# Patient Record
Sex: FEMALE | Race: WHITE | NOT HISPANIC OR LATINO | Employment: OTHER | URBAN - METROPOLITAN AREA
[De-identification: names, ages, dates, MRNs, and addresses within clinical notes are randomized per-mention and may not be internally consistent; named-entity substitution may affect disease eponyms.]

---

## 2017-04-14 ENCOUNTER — HOSPITAL ENCOUNTER (OUTPATIENT)
Dept: RADIOLOGY | Facility: MEDICAL CENTER | Age: 72
End: 2017-04-14
Attending: FAMILY MEDICINE
Payer: MEDICARE

## 2017-04-14 DIAGNOSIS — Z13.9 SCREENING: ICD-10-CM

## 2017-04-14 PROCEDURE — G0202 SCR MAMMO BI INCL CAD: HCPCS

## 2017-04-28 ENCOUNTER — OFFICE VISIT (OUTPATIENT)
Dept: URGENT CARE | Facility: CLINIC | Age: 72
End: 2017-04-28
Payer: MEDICARE

## 2017-04-28 ENCOUNTER — APPOINTMENT (OUTPATIENT)
Dept: RADIOLOGY | Facility: IMAGING CENTER | Age: 72
End: 2017-04-28
Attending: PHYSICIAN ASSISTANT
Payer: MEDICARE

## 2017-04-28 VITALS
WEIGHT: 145 LBS | TEMPERATURE: 98.5 F | DIASTOLIC BLOOD PRESSURE: 88 MMHG | HEIGHT: 68 IN | RESPIRATION RATE: 14 BRPM | OXYGEN SATURATION: 96 % | BODY MASS INDEX: 21.98 KG/M2 | SYSTOLIC BLOOD PRESSURE: 124 MMHG | HEART RATE: 69 BPM

## 2017-04-28 DIAGNOSIS — S92.514A CLOSED NONDISPLACED FRACTURE OF PROXIMAL PHALANX OF LESSER TOE OF RIGHT FOOT, INITIAL ENCOUNTER: ICD-10-CM

## 2017-04-28 DIAGNOSIS — S99.921A FOOT INJURY, RIGHT, INITIAL ENCOUNTER: ICD-10-CM

## 2017-04-28 DIAGNOSIS — S01.111A LACERATION OF EYEBROW, RIGHT, INITIAL ENCOUNTER: ICD-10-CM

## 2017-04-28 PROCEDURE — 1036F TOBACCO NON-USER: CPT | Performed by: PHYSICIAN ASSISTANT

## 2017-04-28 PROCEDURE — 1101F PT FALLS ASSESS-DOCD LE1/YR: CPT | Mod: 8P | Performed by: PHYSICIAN ASSISTANT

## 2017-04-28 PROCEDURE — 12011 RPR F/E/E/N/L/M 2.5 CM/<: CPT | Performed by: PHYSICIAN ASSISTANT

## 2017-04-28 PROCEDURE — 90714 TD VACC NO PRESV 7 YRS+ IM: CPT | Performed by: PHYSICIAN ASSISTANT

## 2017-04-28 PROCEDURE — 90471 IMMUNIZATION ADMIN: CPT | Mod: 59 | Performed by: PHYSICIAN ASSISTANT

## 2017-04-28 PROCEDURE — 4040F PNEUMOC VAC/ADMIN/RCVD: CPT | Mod: 8P | Performed by: PHYSICIAN ASSISTANT

## 2017-04-28 PROCEDURE — G8432 DEP SCR NOT DOC, RNG: HCPCS | Performed by: PHYSICIAN ASSISTANT

## 2017-04-28 PROCEDURE — 99214 OFFICE O/P EST MOD 30 MIN: CPT | Mod: 25 | Performed by: PHYSICIAN ASSISTANT

## 2017-04-28 PROCEDURE — 3014F SCREEN MAMMO DOC REV: CPT | Performed by: PHYSICIAN ASSISTANT

## 2017-04-28 PROCEDURE — 73630 X-RAY EXAM OF FOOT: CPT | Mod: TC,RT | Performed by: PHYSICIAN ASSISTANT

## 2017-04-28 PROCEDURE — 3017F COLORECTAL CA SCREEN DOC REV: CPT | Mod: 8P | Performed by: PHYSICIAN ASSISTANT

## 2017-04-28 PROCEDURE — G8420 CALC BMI NORM PARAMETERS: HCPCS | Performed by: PHYSICIAN ASSISTANT

## 2017-04-28 ASSESSMENT — ENCOUNTER SYMPTOMS
COUGH: 0
FEVER: 0
FOCAL WEAKNESS: 0
SHORTNESS OF BREATH: 0
LOSS OF CONSCIOUSNESS: 0
PALPITATIONS: 0
CHILLS: 0
TINGLING: 0

## 2017-04-28 NOTE — PROGRESS NOTES
Subjective:      Michelle Moon is a 71 y.o. female who presents with Fall            Fall  The accident occurred 12 to 24 hours ago. Fall occurred: slipped on icecube in the kitchen. The point of impact was the head. The pain is present in the head and right foot. Pertinent negatives include no fever, loss of consciousness or tingling. She has tried nothing for the symptoms.       Review of Systems   Constitutional: Negative for fever, chills and malaise/fatigue.   Respiratory: Negative for cough and shortness of breath.    Cardiovascular: Negative for chest pain and palpitations.   Musculoskeletal:        Right foot pain.     Skin:        3 cm laceration above right eyebrow   Neurological: Negative for tingling, focal weakness and loss of consciousness.     All other systems reviewed and are negative.  PMH:  has a past medical history of Hypertension. She also has no past medical history of Breast cancer.  MEDS:   Current outpatient prescriptions:   •  lisinopril (PRINIVIL) 10 MG TABS, Take 10 mg by mouth every day., Disp: , Rfl:   •  FLUoxetine HCl, PMDD, 20 MG CAPS, Take  by mouth every day., Disp: , Rfl:   •  rosuvastatin (CRESTOR) 20 MG TABS, Take 20 mg by mouth every evening., Disp: , Rfl:   •  NON SPECIFIED, DC PICC Line when Daptomycin infusion complete, Disp: 1 Act, Rfl: 0  •  niacin 250 MG Tab, Take 250 mg by mouth every day., Disp: , Rfl:   •  daptomycin (CUBICIN) 500 MG SOLR, by Intravenous route., Disp: , Rfl:   •  oxycodone immediate-release (ROXICODONE) 5 MG TABS, Take 1-2 Tabs by mouth every four hours as needed (for pain)., Disp: 80 Tab, Rfl: 0  •  aspirin EC (ECOTRIN) 325 MG TBEC, Take 1 Tab by mouth every day., Disp: 60 Tab, Rfl: 0  •  CALCIUM PO, Take  by mouth every day., Disp: , Rfl:   •  Coenzyme Q10 (CO Q-10 PO), Take  by mouth every day., Disp: , Rfl:   •  VITAMIN D PO, Take  by mouth every day., Disp: , Rfl:   •  Multiple Vitamin (MULTIVITAMIN PO), Take  by mouth every day., Disp: , Rfl:  "  ALLERGIES:   Allergies   Allergen Reactions   • Nkda [No Known Drug Allergy]      SURGHX:   Past Surgical History   Procedure Laterality Date   • Hysterectomy laparoscopy  1994   • Septoplasty  4/26/2010     Performed by TRISTON SYLVESTER at SURGERY SAME DAY [facility]   • Turbinate reduction  4/26/2010     Performed by TRISTON SYLVESTER at SURGERY [facility] DAY [facility]   • Septoplasty  10/7/2010     Performed by TRISTON SYLVESTER at SURGERY SAME DAY [facility]   • Pr reduction of large breast     • Irrigation & debridement ortho Right 7/21/2015     Procedure: IRRIGATION & DEBRIDEMENT ORTHO;  Surgeon: Sameer Kwan M.D.;  Location: SURGERY [facility];  Service:    • Hardware removal ortho  7/21/2015     Procedure: HARDWARE REMOVAL PATELLA;  Surgeon: Sameer Kwan M.D.;  Location: SURGERY [facility];  Service:    • Irrigation & debridement ortho Right 7/23/2015     Procedure: IRRIGATION & DEBRIDEMENT ORTHO KNEE ;  Surgeon: Sameer Kwan M.D.;  Location: SURGERY [facility];  Service:    • Patella orif  7/23/2015     Procedure: PATELLA ORIF REVISION;  Surgeon: Sameer Kwan M.D.;  Location: SURGERY [facility];  Service:      SOCHX:  reports that she has never smoked. She does not have any smokeless tobacco history on file. She reports that she drinks alcohol. She reports that she does not use illicit drugs.  FH: Family history was reviewed, no pertinent findings to report  Medications, Allergies, and current problem list reviewed today in Epic       Objective:     /88 mmHg  Pulse 69  Temp(Src) 36.9 °C (98.5 °F)  Resp 14  Ht 1.727 m (5' 7.99\")  Wt 65.772 kg (145 lb)  BMI 22.05 kg/m2  SpO2 96%     Physical Exam   Constitutional: She is oriented to person, place, and time. She appears well-developed and well-nourished.   HENT:   Head: Normocephalic.       Cardiovascular: Normal rate, regular rhythm, normal heart sounds, intact distal pulses and normal pulses.  "   Pulmonary/Chest: Effort normal and breath sounds normal.   Musculoskeletal: She exhibits tenderness. She exhibits no edema or deformity.   Tenderness to palpation of 2nd and 3rd digit of right foot.   Neurological: She is alert and oriented to person, place, and time. She has normal reflexes. She displays normal reflexes. She exhibits normal muscle tone. Coordination normal.   Skin: Skin is warm and dry.   Psychiatric: She has a normal mood and affect. Her behavior is normal. Judgment and thought content normal.   Vitals reviewed.         /28/2017 9:38 AM    HISTORY/REASON FOR EXAM:  Fell last evening, second and third toe pain.    TECHNIQUE/EXAM DESCRIPTION AND NUMBER OF VIEWS:  3 nonweightbearing views of the RIGHT foot.    COMPARISON:  None.    FINDINGS:  There is an acute third proximal phalanx fracture at the PIP joint where there is slight articular surface depression    There is no subluxation.    Minimal first metatarsal-phalangeal marginal erosive change appears corticated         Impression        Likely slightly depressed third proximal phalanx fracture at the PIP margin    First metatarsal-phalangeal mild marginal erosive change is suspicious for gout arthropathy. This may also just be degenerative          Assessment/Plan:     1. Closed nondisplaced fracture of proximal phalanx of lesser toe of right foot, initial encounter  DX-FOOT-COMPLETE 3+ RIGHT   2. Laceration of eyebrow, right, initial encounter  Tdap =>8yo IM    TD =>8yo IM                Dermabond  -herbert tape toes w/ surgical shoe, pt declines crutches  -F/U with private ortho doc per her request                 Differential diagnosis, natural history, supportive care, and indications for immediate follow-up discussed at length.   Follow-up with primary care provider within 4-5 days, emergency room precautions discussed.  Patient and/or family appears understanding of information.

## 2017-04-28 NOTE — PATIENT INSTRUCTIONS
Toe Fracture  A toe fracture is a break in one of the toe bones (phalanges).  CAUSES  This condition may be caused by:  · Dropping a heavy object on your toe.  · Stubbing your toe.  · Overusing your toe or doing repetitive exercise.  · Twisting or stretching your toe out of place.  RISK FACTORS  This condition is more likely to develop in people who:  · Play contact sports.  · Have a bone disease.  · Have a low calcium level.  SYMPTOMS  The main symptoms of this condition are swelling and pain in the toe. The pain may get worse with standing or walking. Other symptoms include:  · Bruising.  · Stiffness.  · Numbness.  · A change in the way the toe looks.  · Broken bones that poke through the skin.  · Blood beneath the toenail.  DIAGNOSIS  This condition is diagnosed with a physical exam. You may also have X-rays.  TREATMENT   Treatment for this condition depends on the type of fracture and its severity. Treatment may involve:  · Taping the broken toe to a toe that is next to it (herbert taping). This is the most common treatment for fractures in which the bone has not moved out of place (nondisplaced fracture).  · Wearing a shoe that has a wide, rigid sole to protect the toe and to limit its movement.  · Wearing a walking cast.  · Having a procedure to move the toe back into place.  · Surgery. This may be needed:  ¨ If there are many pieces of broken bone that are out of place (displaced).  ¨ If the toe joint breaks.  ¨ If the bone breaks through the skin.  · Physical therapy. This is done to help regain movement and strength in the toe.  You may need follow-up X-rays to make sure that the bone is healing well and staying in position.  HOME CARE INSTRUCTIONS  · If your toe was treated with herbert taping, follow instructions from your health care provider about changing the gauze and tape.  If You Have a Cast:  · Do not stick anything inside the cast to scratch your skin. Doing that increases your risk of  infection.  · Check the skin around the cast every day. Report any concerns to your health care provider. You may put lotion on dry skin around the edges of the cast. Do not apply lotion to the skin underneath the cast.  · Do not put pressure on any part of the cast until it is fully hardened. This may take several hours.  · Keep the cast clean and dry.  Bathing  · Do not take baths, swim, or use a hot tub until your health care provider approves. Ask your health care provider if you can take showers. You may only be allowed to take sponge baths for bathing.  · If your health care provider approves bathing and showering, cover the cast or bandage (dressing) with a watertight plastic bag to protect it from water. Do not let the cast or dressing get wet.  Managing Pain, Stiffness, and Swelling  · If you do not have a cast, apply ice to the injured area, if directed.  ¨ Put ice in a plastic bag.  ¨ Place a towel between your skin and the bag.  ¨ Leave the ice on for 20 minutes, 2-3 times per day.  · Move your toes often to avoid stiffness and to lessen swelling.  · Raise (elevate) the injured area above the level of your heart while you are sitting or lying down.  Driving  · Do not drive or operate heavy machinery while taking pain medicine.  · Do not drive while wearing a cast on a foot that you use for driving.  Activity  · Return to your normal activities as directed by your health care provider. Ask your health care provider what activities are safe for you.  · Perform exercises daily as directed by your health care provider or physical therapist.  Safety  · Do not use the injured limb to support your body weight until your health care provider says that you can. Use crutches or other assistive devices as directed by your health care provider.  General Instructions  · If your toe was treated with herbert taping, follow your health care provider's instructions for changing the gauze and tape. Change it more  often:  ¨ The gauze and tape get wet. If this happens, dry the space between the toes.  ¨ The gauze and tape are too tight and cause your toe to become pale or numb.  · Wear a protective shoe as directed by your health care provider. If you were not given a protective shoe, wear sturdy, supportive shoes. Your shoes should not pinch your toes and should not fit tightly against your toes.  · Do not use any tobacco products, including cigarettes, chewing tobacco, or e-cigarettes. Tobacco can delay bone healing. If you need help quitting, ask your health care provider.  · Take medicines only as directed by your health care provider.  · Keep all follow-up visits as directed by your health care provider. This is important.  SEEK MEDICAL CARE IF:  · You have a fever.  · Your pain medicine is not helping.  · Your toe is cold.  · Your toe is numb.  · You still have pain after one week of rest and treatment.  · You still have pain after your health care provider has said that you can start walking again.  · You have pain, tingling, or numbness in your foot that is not going away.  SEEK IMMEDIATE MEDICAL CARE IF:  · You have severe pain.  · You have redness or inflammation in your toe that is getting worse.  · You have pain or numbness in your toe that is getting worse.  · Your toe turns blue.     This information is not intended to replace advice given to you by your health care provider. Make sure you discuss any questions you have with your health care provider.     Document Released: 12/15/2001 Document Revised: 05/03/2016 Document Reviewed: 10/14/2015  [employer] Interactive Patient Education ©2016 [employer]  Nonsutured Laceration Care  A laceration is a cut that goes through all layers of the skin and extends into the tissue that is right under the skin. This type of cut is usually stitched up (sutured) or closed with tape (adhesive strips) or skin glue shortly after the injury happens.  However, if the wound is dirty  or if several hours pass before medical treatment is provided, it is likely that germs (bacteria) will enter the wound. Closing a laceration after bacteria have entered it increases the risk of infection. In these cases, your health care provider may leave the laceration open (nonsutured) and cover it with a bandage. This type of treatment helps prevent infection and allows the wound to heal from the deepest layer of tissue damage up to the surface.  An open fracture is a type of injury that may involve nonsutured lacerations. An open fracture is a break in a bone that happens along with one or more lacerations through the skin that is near the fracture site.  HOW TO CARE FOR YOUR NONSUTURED LACERATION  · Take or apply over-the-counter and prescription medicines only as told by your health care provider.  · If you were prescribed an antibiotic medicine, take or apply it as told by your health care provider. Do not stop using the antibiotic even if your condition improves.  · Clean the wound one time each day or as told by your health care provider.  ¨ Wash the wound with mild soap and water.  ¨ Rinse the wound with water to remove all soap.  ¨ Pat your wound dry with a clean towel. Do not rub the wound.  · Do not inject anything into the wound unless your health care provider told you to.  · Change any bandages (dressings) as told by your health care provider. This includes changing the dressing if it gets wet, dirty, or starts to smell bad.  · Keep the dressing dry until your health care provider says it can be removed. Do not take baths, swim, or do anything that puts your wound underwater until your health care provider approves.  · Raise (elevate) the injured area above the level of your heart while you are sitting or lying down, if possible.  · Do not scratch or pick at the wound.  · Check your wound every day for signs of infection. Watch for:  ¨ Redness, swelling, or pain.  ¨ Fluid, blood, or pus.  · Keep all  follow-up visits as told by your health care provider. This is important.  SEEK MEDICAL CARE IF:  · You received a tetanus and shot and you have swelling, severe pain, redness, or bleeding at the injection site.    · You have a fever.  · Your pain is not controlled with medicine.  · You have increased redness, swelling, or pain at the site of your wound.  · You have fluid, blood, or pus coming from your wound.  · You notice a bad smell coming from your wound or your dressing.  · You notice something coming out of the wound, such as wood or glass.  · You notice a change in the color of your skin near your wound.  · You develop a new rash.  · You need to change the dressing frequently due to fluid, blood, or pus draining from the wound.  · You develop numbness around your wound.  SEEK IMMEDIATE MEDICAL CARE IF:  · Your pain suddenly increases and is severe.  · You develop severe swelling around the wound.  · The wound is on your hand or foot and you cannot properly move a finger or toe.  · The wound is on your hand or foot and you notice that your fingers or toes look pale or bluish.  · You have a red streak going away from your wound.     This information is not intended to replace advice given to you by your health care provider. Make sure you discuss any questions you have with your health care provider.     Document Released: 11/15/2007 Document Revised: 05/03/2016 Document Reviewed: 12/14/2015  [employer] Interactive Patient Education ©2016 [employer]

## 2017-04-28 NOTE — MR AVS SNAPSHOT
"Michelle Moon   2017 9:00 AM   Office Visit   MRN: [id number]    Department:  [facility]   Dept Phone:  [phone]    Description:  Female : 1945   Provider:  Terrell Escoto PA-C           Reason for Visit     Fall patient had a fall last night, laceration right eyebrow and injured right toes      Allergies as of 2017     Allergen Noted Reactions    Nkda [No Known Drug Allergy] 2010         You were diagnosed with     Closed nondisplaced fracture of proximal phalanx of lesser toe of right foot, initial encounter   [222215]       Laceration of eyebrow, right, initial encounter   [547806]         Vital Signs     Blood Pressure Pulse Temperature Respirations Height Weight    124/88 mmHg 69 36.9 °C (98.5 °F) 14 1.727 m (5' 7.99\") 65.772 kg (145 lb)    Body Mass Index Oxygen Saturation                22.05 kg/m2 96%          Basic Information     Date Of Birth Sex Race Ethnicity Preferred Language    1945 Female White Non- English      Problem List              ICD-10-CM Priority Class Noted - Resolved    Closed fracture of patella S82.009A   2015 - Present    Post op infection T81.4XXA   2015 - Present    Septic arthritis of knee (CMS-HCC) M00.9   2015 - Present      Health Maintenance        Date Due Completion Dates    IMM DTaP/Tdap/Td Vaccine (1 - Tdap) 1966 ---    PAP SMEAR 1968 ---    COLONOSCOPY 1997 ---    IMM ZOSTER VACCINE 2007 ---    IMM PNEUMOCOCCAL 65+ (ADULT) LOW/MEDIUM RISK SERIES (1 of 2 - PCV13) 2012 ---    MAMMOGRAM 2018, 3/16/2016, 2015, 2013, 3/20/2012, 3/9/2011, 2010, 2010, 2009, 2009, 2007, 2007, 2006    BONE DENSITY 9/3/2020 9/3/2015, 2011            Current Immunizations     Influenza Vaccine Adult HD 10/27/2016  3:18 PM, 2015  3:37 PM, 10/1/2014    TD Vaccine 2017 10:26 AM    Tdap Vaccine  Incomplete      Below and/or " attached are the medications your provider expects you to take. Review all of your home medications and newly ordered medications with your provider and/or pharmacist. Follow medication instructions as directed by your provider and/or pharmacist. Please keep your medication list with you and share with your provider. Update the information when medications are discontinued, doses are changed, or new medications (including over-the-counter products) are added; and carry medication information at all times in the event of emergency situations     Allergies:  NKDA - (reactions not documented)               Medications  Valid as of: April 28, 2017 - 10:40 AM    Generic Name Brand Name Tablet Size Instructions for use    Aspirin (Tablet Delayed Response) ECOTRIN 325 MG Take 1 Tab by mouth every day.        Calcium   Take  by mouth every day.        Cholecalciferol   Take  by mouth every day.        Coenzyme Q10   Take  by mouth every day.        DAPTOmycin (Recon Soln) CUBICIN 500 MG by Intravenous route.        FLUoxetine HCl (PMDD) (Cap) Fluoxetine HCl (PMDD) 20 MG Take  by mouth every day.        Lisinopril (Tab) PRINIVIL 10 MG Take 10 mg by mouth every day.        Multiple Vitamins-Minerals   Take  by mouth every day.        Niacin (Tab) niacin 250 MG Take 250 mg by mouth every day.        NON SPECIFIED   DC PICC Line when Daptomycin infusion complete        OxyCODONE HCl (Tab) ROXICODONE 5 MG Take 1-2 Tabs by mouth every four hours as needed (for pain).        Rosuvastatin Calcium (Tab) CRESTOR 20 MG Take 20 mg by mouth every evening.        .                 Medicines prescribed today were sent to:     [id number] - [place], [place] - [address] [place] [address]    Phone: [phone] Fax: [phone]    Open 24 Hours?: No      Medication refill instructions:       If your prescription bottle indicates you have medication refills left, it is not necessary to call your provider’s office.  Please contact your pharmacy and they will refill your medication.    If your prescription bottle indicates you do not have any refills left, you may request refills at any time through one of the following ways: The online [employer] system (except Urgent Care), by calling your provider’s office, or by asking your pharmacy to contact your provider’s office with a refill request. Medication refills are processed only during regular business hours and may not be available until the next business day. Your provider may request additional information or to have a follow-up visit with you prior to refilling your medication.   *Please Note: Medication refills are assigned a new Rx number when refilled electronically. Your pharmacy may indicate that no refills were authorized even though a new prescription for the same medication is available at the pharmacy. Please request the medicine by name with the pharmacy before contacting your provider for a refill.        Your To Do List     Future Labs/Procedures Complete By Expires    DX-FOOT-COMPLETE 3+ RIGHT  As directed 4/28/2018      Instructions    Toe Fracture  A toe fracture is a break in one of the toe bones (phalanges).  CAUSES  This condition may be caused by:  · Dropping a heavy object on your toe.  · Stubbing your toe.  · Overusing your toe or doing repetitive exercise.  · Twisting or stretching your toe out of place.  RISK FACTORS  This condition is more likely to develop in people who:  · Play contact sports.  · Have a bone disease.  · Have a low calcium level.  SYMPTOMS  The main symptoms of this condition are swelling and pain in the toe. The pain may get worse with standing or walking. Other symptoms include:  · Bruising.  · Stiffness.  · Numbness.  · A change in the way the toe looks.  · Broken bones that poke through the skin.  · Blood beneath the toenail.  DIAGNOSIS  This condition is diagnosed with a physical exam. You may also have X-rays.  TREATMENT      Treatment for this condition depends on the type of fracture and its severity. Treatment may involve:  · Taping the broken toe to a toe that is next to it (herbert taping). This is the most common treatment for fractures in which the bone has not moved out of place (nondisplaced fracture).  · Wearing a shoe that has a wide, rigid sole to protect the toe and to limit its movement.  · Wearing a walking cast.  · Having a procedure to move the toe back into place.  · Surgery. This may be needed:  ¨ If there are many pieces of broken bone that are out of place (displaced).  ¨ If the toe joint breaks.  ¨ If the bone breaks through the skin.  · Physical therapy. This is done to help regain movement and strength in the toe.  You may need follow-up X-rays to make sure that the bone is healing well and staying in position.  HOME CARE INSTRUCTIONS  · If your toe was treated with herbert taping, follow instructions from your health care provider about changing the gauze and tape.  If You Have a Cast:  · Do not stick anything inside the cast to scratch your skin. Doing that increases your risk of infection.  · Check the skin around the cast every day. Report any concerns to your health care provider. You may put lotion on dry skin around the edges of the cast. Do not apply lotion to the skin underneath the cast.  · Do not put pressure on any part of the cast until it is fully hardened. This may take several hours.  · Keep the cast clean and dry.  Bathing  · Do not take baths, swim, or use a hot tub until your health care provider approves. Ask your health care provider if you can take showers. You may only be allowed to take sponge baths for bathing.  · If your health care provider approves bathing and showering, cover the cast or bandage (dressing) with a watertight plastic bag to protect it from water. Do not let the cast or dressing get wet.  Managing Pain, Stiffness, and Swelling  · If you do not have a cast, apply ice to the  injured area, if directed.  ¨ Put ice in a plastic bag.  ¨ Place a towel between your skin and the bag.  ¨ Leave the ice on for 20 minutes, 2-3 times per day.  · Move your toes often to avoid stiffness and to lessen swelling.  · Raise (elevate) the injured area above the level of your heart while you are sitting or lying down.  Driving  · Do not drive or operate heavy machinery while taking pain medicine.  · Do not drive while wearing a cast on a foot that you use for driving.  Activity  · Return to your normal activities as directed by your health care provider. Ask your health care provider what activities are safe for you.  · Perform exercises daily as directed by your health care provider or physical therapist.  Safety  · Do not use the injured limb to support your body weight until your health care provider says that you can. Use crutches or other assistive devices as directed by your health care provider.  General Instructions  · If your toe was treated with herbert taping, follow your health care provider's instructions for changing the gauze and tape. Change it more often:  ¨ The gauze and tape get wet. If this happens, dry the space between the toes.  ¨ The gauze and tape are too tight and cause your toe to become pale or numb.  · Wear a protective shoe as directed by your health care provider. If you were not given a protective shoe, wear sturdy, supportive shoes. Your shoes should not pinch your toes and should not fit tightly against your toes.  · Do not use any tobacco products, including cigarettes, chewing tobacco, or e-cigarettes. Tobacco can delay bone healing. If you need help quitting, ask your health care provider.  · Take medicines only as directed by your health care provider.  · Keep all follow-up visits as directed by your health care provider. This is important.  SEEK MEDICAL CARE IF:  · You have a fever.  · Your pain medicine is not helping.  · Your toe is cold.  · Your toe is numb.  · You  still have pain after one week of rest and treatment.  · You still have pain after your health care provider has said that you can start walking again.  · You have pain, tingling, or numbness in your foot that is not going away.  SEEK IMMEDIATE MEDICAL CARE IF:  · You have severe pain.  · You have redness or inflammation in your toe that is getting worse.  · You have pain or numbness in your toe that is getting worse.  · Your toe turns blue.     This information is not intended to replace advice given to you by your health care provider. Make sure you discuss any questions you have with your health care provider.     Document Released: 12/15/2001 Document Revised: 05/03/2016 Document Reviewed: 10/14/2015  [employer] Interactive Patient Education ©2016 [employer]  Nonsutured Laceration Care  A laceration is a cut that goes through all layers of the skin and extends into the tissue that is right under the skin. This type of cut is usually stitched up (sutured) or closed with tape (adhesive strips) or skin glue shortly after the injury happens.  However, if the wound is dirty or if several hours pass before medical treatment is provided, it is likely that germs (bacteria) will enter the wound. Closing a laceration after bacteria have entered it increases the risk of infection. In these cases, your health care provider may leave the laceration open (nonsutured) and cover it with a bandage. This type of treatment helps prevent infection and allows the wound to heal from the deepest layer of tissue damage up to the surface.  An open fracture is a type of injury that may involve nonsutured lacerations. An open fracture is a break in a bone that happens along with one or more lacerations through the skin that is near the fracture site.  HOW TO CARE FOR YOUR NONSUTURED LACERATION  · Take or apply over-the-counter and prescription medicines only as told by your health care provider.  · If you were prescribed an antibiotic  medicine, take or apply it as told by your health care provider. Do not stop using the antibiotic even if your condition improves.  · Clean the wound one time each day or as told by your health care provider.  ¨ Wash the wound with mild soap and water.  ¨ Rinse the wound with water to remove all soap.  ¨ Pat your wound dry with a clean towel. Do not rub the wound.  · Do not inject anything into the wound unless your health care provider told you to.  · Change any bandages (dressings) as told by your health care provider. This includes changing the dressing if it gets wet, dirty, or starts to smell bad.  · Keep the dressing dry until your health care provider says it can be removed. Do not take baths, swim, or do anything that puts your wound underwater until your health care provider approves.  · Raise (elevate) the injured area above the level of your heart while you are sitting or lying down, if possible.  · Do not scratch or pick at the wound.  · Check your wound every day for signs of infection. Watch for:  ¨ Redness, swelling, or pain.  ¨ Fluid, blood, or pus.  · Keep all follow-up visits as told by your health care provider. This is important.  SEEK MEDICAL CARE IF:  · You received a tetanus and shot and you have swelling, severe pain, redness, or bleeding at the injection site.    · You have a fever.  · Your pain is not controlled with medicine.  · You have increased redness, swelling, or pain at the site of your wound.  · You have fluid, blood, or pus coming from your wound.  · You notice a bad smell coming from your wound or your dressing.  · You notice something coming out of the wound, such as wood or glass.  · You notice a change in the color of your skin near your wound.  · You develop a new rash.  · You need to change the dressing frequently due to fluid, blood, or pus draining from the wound.  · You develop numbness around your wound.  SEEK IMMEDIATE MEDICAL CARE IF:  · Your pain suddenly increases  and is severe.  · You develop severe swelling around the wound.  · The wound is on your hand or foot and you cannot properly move a finger or toe.  · The wound is on your hand or foot and you notice that your fingers or toes look pale or bluish.  · You have a red streak going away from your wound.     This information is not intended to replace advice given to you by your health care provider. Make sure you discuss any questions you have with your health care provider.     Document Released: 11/15/2007 Document Revised: 05/03/2016 Document Reviewed: 12/14/2015  [employer] Interactive Patient Education ©2016 [employer]            [employer] Access Code: Activation code not generated  Current [employer] Status: Active

## 2017-10-04 ENCOUNTER — APPOINTMENT (OUTPATIENT)
Dept: SOCIAL WORK | Facility: CLINIC | Age: 72
End: 2017-10-04
Payer: MEDICARE

## 2017-10-04 PROCEDURE — 90670 PCV13 VACCINE IM: CPT | Performed by: REGISTERED NURSE

## 2017-10-04 PROCEDURE — G0008 ADMIN INFLUENZA VIRUS VAC: HCPCS | Performed by: REGISTERED NURSE

## 2017-10-04 PROCEDURE — 90662 IIV NO PRSV INCREASED AG IM: CPT | Performed by: REGISTERED NURSE

## 2017-10-04 PROCEDURE — G0009 ADMIN PNEUMOCOCCAL VACCINE: HCPCS | Performed by: REGISTERED NURSE

## 2018-04-27 ENCOUNTER — HOSPITAL ENCOUNTER (OUTPATIENT)
Dept: RADIOLOGY | Facility: MEDICAL CENTER | Age: 73
End: 2018-04-27
Attending: FAMILY MEDICINE
Payer: MEDICARE

## 2018-04-27 DIAGNOSIS — N95.9 MENOPAUSAL PROBLEM: ICD-10-CM

## 2018-04-27 DIAGNOSIS — Z13.820 SCREENING FOR OSTEOPOROSIS: ICD-10-CM

## 2018-04-27 DIAGNOSIS — Z12.39 SCREENING BREAST EXAMINATION: ICD-10-CM

## 2018-04-27 PROCEDURE — 77067 SCR MAMMO BI INCL CAD: CPT

## 2018-04-27 PROCEDURE — 77080 DXA BONE DENSITY AXIAL: CPT

## 2018-06-16 ENCOUNTER — HOSPITAL ENCOUNTER (OUTPATIENT)
Facility: MEDICAL CENTER | Age: 73
End: 2018-06-18
Attending: EMERGENCY MEDICINE | Admitting: HOSPITALIST
Payer: MEDICARE

## 2018-06-16 ENCOUNTER — APPOINTMENT (OUTPATIENT)
Dept: RADIOLOGY | Facility: MEDICAL CENTER | Age: 73
End: 2018-06-16
Attending: EMERGENCY MEDICINE
Payer: MEDICARE

## 2018-06-16 DIAGNOSIS — K92.2 GASTROINTESTINAL HEMORRHAGE, UNSPECIFIED GASTROINTESTINAL HEMORRHAGE TYPE: ICD-10-CM

## 2018-06-16 DIAGNOSIS — I95.9 HYPOTENSION, UNSPECIFIED HYPOTENSION TYPE: ICD-10-CM

## 2018-06-16 LAB
ABO GROUP BLD: NORMAL
ABO GROUP BLD: NORMAL
ALBUMIN SERPL BCP-MCNC: 4.3 G/DL (ref 3.2–4.9)
ALBUMIN/GLOB SERPL: 1.7 G/DL
ALP SERPL-CCNC: 36 U/L (ref 30–99)
ALT SERPL-CCNC: 21 U/L (ref 2–50)
ANION GAP SERPL CALC-SCNC: 12 MMOL/L (ref 0–11.9)
APTT PPP: 24.1 SEC (ref 24.7–36)
AST SERPL-CCNC: 19 U/L (ref 12–45)
BARCODED ABORH UBTYP: 5100
BARCODED PRD CODE UBPRD: NORMAL
BARCODED UNIT NUM UBUNT: NORMAL
BASOPHILS # BLD AUTO: 0.2 % (ref 0–1.8)
BASOPHILS # BLD: 0.02 K/UL (ref 0–0.12)
BILIRUB SERPL-MCNC: 0.5 MG/DL (ref 0.1–1.5)
BLD GP AB SCN SERPL QL: NORMAL
BUN SERPL-MCNC: 48 MG/DL (ref 8–22)
CALCIUM SERPL-MCNC: 8.5 MG/DL (ref 8.5–10.5)
CHLORIDE SERPL-SCNC: 105 MMOL/L (ref 96–112)
CO2 SERPL-SCNC: 21 MMOL/L (ref 20–33)
COMPONENT R 8504R: NORMAL
CREAT SERPL-MCNC: 0.56 MG/DL (ref 0.5–1.4)
EKG IMPRESSION: NORMAL
EOSINOPHIL # BLD AUTO: 0.01 K/UL (ref 0–0.51)
EOSINOPHIL NFR BLD: 0.1 % (ref 0–6.9)
ERYTHROCYTE [DISTWIDTH] IN BLOOD BY AUTOMATED COUNT: 43.6 FL (ref 35.9–50)
GLOBULIN SER CALC-MCNC: 2.5 G/DL (ref 1.9–3.5)
GLUCOSE SERPL-MCNC: 171 MG/DL (ref 65–99)
HCT VFR BLD AUTO: 36.3 % (ref 37–47)
HGB BLD-MCNC: 12.2 G/DL (ref 12–16)
IMM GRANULOCYTES # BLD AUTO: 0.06 K/UL (ref 0–0.11)
IMM GRANULOCYTES NFR BLD AUTO: 0.7 % (ref 0–0.9)
INR PPP: 1.15 (ref 0.87–1.13)
LYMPHOCYTES # BLD AUTO: 1.37 K/UL (ref 1–4.8)
LYMPHOCYTES NFR BLD: 15 % (ref 22–41)
MAGNESIUM SERPL-MCNC: 1.9 MG/DL (ref 1.5–2.5)
MCH RBC QN AUTO: 31.7 PG (ref 27–33)
MCHC RBC AUTO-ENTMCNC: 33.6 G/DL (ref 33.6–35)
MCV RBC AUTO: 94.3 FL (ref 81.4–97.8)
MONOCYTES # BLD AUTO: 0.45 K/UL (ref 0–0.85)
MONOCYTES NFR BLD AUTO: 4.9 % (ref 0–13.4)
NEUTROPHILS # BLD AUTO: 7.21 K/UL (ref 2–7.15)
NEUTROPHILS NFR BLD: 79.1 % (ref 44–72)
NRBC # BLD AUTO: 0 K/UL
NRBC BLD-RTO: 0 /100 WBC
PLATELET # BLD AUTO: 232 K/UL (ref 164–446)
PMV BLD AUTO: 10.6 FL (ref 9–12.9)
POTASSIUM SERPL-SCNC: 4 MMOL/L (ref 3.6–5.5)
PRODUCT TYPE UPROD: NORMAL
PROT SERPL-MCNC: 6.8 G/DL (ref 6–8.2)
PROTHROMBIN TIME: 14.4 SEC (ref 12–14.6)
RBC # BLD AUTO: 3.85 M/UL (ref 4.2–5.4)
RH BLD: NORMAL
RH BLD: NORMAL
SODIUM SERPL-SCNC: 138 MMOL/L (ref 135–145)
TROPONIN I SERPL-MCNC: <0.01 NG/ML (ref 0–0.04)
TSH SERPL DL<=0.005 MIU/L-ACNC: 0.65 UIU/ML (ref 0.38–5.33)
UNIT STATUS USTAT: NORMAL
WBC # BLD AUTO: 9.1 K/UL (ref 4.8–10.8)

## 2018-06-16 PROCEDURE — 700105 HCHG RX REV CODE 258: Performed by: HOSPITALIST

## 2018-06-16 PROCEDURE — 88305 TISSUE EXAM BY PATHOLOGIST: CPT

## 2018-06-16 PROCEDURE — 85610 PROTHROMBIN TIME: CPT

## 2018-06-16 PROCEDURE — 83036 HEMOGLOBIN GLYCOSYLATED A1C: CPT

## 2018-06-16 PROCEDURE — 94760 N-INVAS EAR/PLS OXIMETRY 1: CPT

## 2018-06-16 PROCEDURE — 160203 HCHG ENDO MINUTES - 1ST 30 MINS LEVEL 4: Performed by: INTERNAL MEDICINE

## 2018-06-16 PROCEDURE — 83735 ASSAY OF MAGNESIUM: CPT

## 2018-06-16 PROCEDURE — 93005 ELECTROCARDIOGRAM TRACING: CPT | Performed by: EMERGENCY MEDICINE

## 2018-06-16 PROCEDURE — 86901 BLOOD TYPING SEROLOGIC RH(D): CPT

## 2018-06-16 PROCEDURE — 86923 COMPATIBILITY TEST ELECTRIC: CPT

## 2018-06-16 PROCEDURE — 700105 HCHG RX REV CODE 258: Performed by: EMERGENCY MEDICINE

## 2018-06-16 PROCEDURE — 700111 HCHG RX REV CODE 636 W/ 250 OVERRIDE (IP): Performed by: HOSPITALIST

## 2018-06-16 PROCEDURE — 88312 SPECIAL STAINS GROUP 1: CPT

## 2018-06-16 PROCEDURE — 85025 COMPLETE CBC W/AUTO DIFF WBC: CPT

## 2018-06-16 PROCEDURE — 99220 PR INITIAL OBSERVATION CARE,LEVL III: CPT | Performed by: HOSPITALIST

## 2018-06-16 PROCEDURE — 96375 TX/PRO/DX INJ NEW DRUG ADDON: CPT | Mod: XU

## 2018-06-16 PROCEDURE — 86900 BLOOD TYPING SEROLOGIC ABO: CPT

## 2018-06-16 PROCEDURE — 80053 COMPREHEN METABOLIC PANEL: CPT

## 2018-06-16 PROCEDURE — 160048 HCHG OR STATISTICAL LEVEL 1-5: Performed by: INTERNAL MEDICINE

## 2018-06-16 PROCEDURE — 306588 SLEEVE,VASO CALF MED: Performed by: HOSPITALIST

## 2018-06-16 PROCEDURE — 700102 HCHG RX REV CODE 250 W/ 637 OVERRIDE(OP): Performed by: HOSPITALIST

## 2018-06-16 PROCEDURE — 84443 ASSAY THYROID STIM HORMONE: CPT

## 2018-06-16 PROCEDURE — P9016 RBC LEUKOCYTES REDUCED: HCPCS

## 2018-06-16 PROCEDURE — 99285 EMERGENCY DEPT VISIT HI MDM: CPT

## 2018-06-16 PROCEDURE — 700111 HCHG RX REV CODE 636 W/ 250 OVERRIDE (IP): Performed by: EMERGENCY MEDICINE

## 2018-06-16 PROCEDURE — 96374 THER/PROPH/DIAG INJ IV PUSH: CPT | Mod: XU

## 2018-06-16 PROCEDURE — 36430 TRANSFUSION BLD/BLD COMPNT: CPT | Mod: XU

## 2018-06-16 PROCEDURE — C9113 INJ PANTOPRAZOLE SODIUM, VIA: HCPCS | Performed by: EMERGENCY MEDICINE

## 2018-06-16 PROCEDURE — 500066 HCHG BITE BLOCK, ECT: Performed by: INTERNAL MEDICINE

## 2018-06-16 PROCEDURE — 85730 THROMBOPLASTIN TIME PARTIAL: CPT

## 2018-06-16 PROCEDURE — 84484 ASSAY OF TROPONIN QUANT: CPT

## 2018-06-16 PROCEDURE — G0378 HOSPITAL OBSERVATION PER HR: HCPCS

## 2018-06-16 PROCEDURE — 86850 RBC ANTIBODY SCREEN: CPT

## 2018-06-16 PROCEDURE — A9270 NON-COVERED ITEM OR SERVICE: HCPCS | Performed by: HOSPITALIST

## 2018-06-16 PROCEDURE — 71045 X-RAY EXAM CHEST 1 VIEW: CPT

## 2018-06-16 PROCEDURE — C9113 INJ PANTOPRAZOLE SODIUM, VIA: HCPCS | Performed by: HOSPITALIST

## 2018-06-16 RX ORDER — BISACODYL 10 MG
10 SUPPOSITORY, RECTAL RECTAL
Status: DISCONTINUED | OUTPATIENT
Start: 2018-06-16 | End: 2018-06-18 | Stop reason: HOSPADM

## 2018-06-16 RX ORDER — SODIUM CHLORIDE 9 MG/ML
INJECTION, SOLUTION INTRAVENOUS CONTINUOUS
Status: ACTIVE | OUTPATIENT
Start: 2018-06-16 | End: 2018-06-17

## 2018-06-16 RX ORDER — POLYETHYLENE GLYCOL 3350 17 G/17G
1 POWDER, FOR SOLUTION ORAL
Status: DISCONTINUED | OUTPATIENT
Start: 2018-06-16 | End: 2018-06-18 | Stop reason: HOSPADM

## 2018-06-16 RX ORDER — ONDANSETRON 2 MG/ML
4 INJECTION INTRAMUSCULAR; INTRAVENOUS EVERY 4 HOURS PRN
Status: DISCONTINUED | OUTPATIENT
Start: 2018-06-16 | End: 2018-06-18 | Stop reason: HOSPADM

## 2018-06-16 RX ORDER — AMOXICILLIN 250 MG
2 CAPSULE ORAL 2 TIMES DAILY
Status: DISCONTINUED | OUTPATIENT
Start: 2018-06-16 | End: 2018-06-18 | Stop reason: HOSPADM

## 2018-06-16 RX ORDER — FLUOXETINE HYDROCHLORIDE 20 MG/1
20 CAPSULE ORAL DAILY
Status: DISCONTINUED | OUTPATIENT
Start: 2018-06-17 | End: 2018-06-18 | Stop reason: HOSPADM

## 2018-06-16 RX ORDER — ACETAMINOPHEN 325 MG/1
650 TABLET ORAL EVERY 6 HOURS PRN
Status: DISCONTINUED | OUTPATIENT
Start: 2018-06-16 | End: 2018-06-18 | Stop reason: HOSPADM

## 2018-06-16 RX ORDER — SODIUM CHLORIDE 9 MG/ML
1000 INJECTION, SOLUTION INTRAVENOUS ONCE
Status: COMPLETED | OUTPATIENT
Start: 2018-06-16 | End: 2018-06-16

## 2018-06-16 RX ORDER — EZETIMIBE 10 MG/1
10 TABLET ORAL DAILY
Status: ON HOLD | COMMUNITY
End: 2019-01-22

## 2018-06-16 RX ORDER — SODIUM CHLORIDE 9 MG/ML
INJECTION, SOLUTION INTRAVENOUS CONTINUOUS
Status: DISCONTINUED | OUTPATIENT
Start: 2018-06-16 | End: 2018-06-18 | Stop reason: HOSPADM

## 2018-06-16 RX ORDER — EZETIMIBE 10 MG/1
10 TABLET ORAL DAILY
Status: DISCONTINUED | OUTPATIENT
Start: 2018-06-17 | End: 2018-06-18 | Stop reason: HOSPADM

## 2018-06-16 RX ORDER — ROSUVASTATIN CALCIUM 20 MG/1
20 TABLET, COATED ORAL EVERY EVENING
Status: DISCONTINUED | OUTPATIENT
Start: 2018-06-16 | End: 2018-06-18 | Stop reason: HOSPADM

## 2018-06-16 RX ORDER — ONDANSETRON 4 MG/1
4 TABLET, ORALLY DISINTEGRATING ORAL EVERY 4 HOURS PRN
Status: DISCONTINUED | OUTPATIENT
Start: 2018-06-16 | End: 2018-06-18 | Stop reason: HOSPADM

## 2018-06-16 RX ORDER — PANTOPRAZOLE SODIUM 40 MG/10ML
40 INJECTION, POWDER, LYOPHILIZED, FOR SOLUTION INTRAVENOUS 2 TIMES DAILY
Status: DISCONTINUED | OUTPATIENT
Start: 2018-06-16 | End: 2018-06-18 | Stop reason: HOSPADM

## 2018-06-16 RX ORDER — PROPOFOL 10 MG/ML
200 INJECTION, EMULSION INTRAVENOUS ONCE
Status: COMPLETED | OUTPATIENT
Start: 2018-06-16 | End: 2018-06-16

## 2018-06-16 RX ORDER — FLUOXETINE HYDROCHLORIDE 20 MG/1
20 CAPSULE ORAL DAILY
COMMUNITY

## 2018-06-16 RX ADMIN — PANTOPRAZOLE SODIUM 40 MG: 40 INJECTION, POWDER, LYOPHILIZED, FOR SOLUTION INTRAVENOUS at 20:33

## 2018-06-16 RX ADMIN — SODIUM CHLORIDE 1000 ML: 9 INJECTION, SOLUTION INTRAVENOUS at 16:30

## 2018-06-16 RX ADMIN — SODIUM CHLORIDE 1000 ML: 9 INJECTION, SOLUTION INTRAVENOUS at 16:39

## 2018-06-16 RX ADMIN — SODIUM CHLORIDE 500 ML: 9 INJECTION, SOLUTION INTRAVENOUS at 20:14

## 2018-06-16 RX ADMIN — SODIUM CHLORIDE 80 MG: 9 INJECTION, SOLUTION INTRAVENOUS at 16:30

## 2018-06-16 RX ADMIN — PROPOFOL 120 MG: 10 INJECTION, EMULSION INTRAVENOUS at 18:00

## 2018-06-16 RX ADMIN — ROSUVASTATIN CALCIUM 20 MG: 20 TABLET, FILM COATED ORAL at 20:14

## 2018-06-16 RX ADMIN — SODIUM CHLORIDE: 9 INJECTION, SOLUTION INTRAVENOUS at 18:30

## 2018-06-16 ASSESSMENT — ENCOUNTER SYMPTOMS
DIZZINESS: 0
PALPITATIONS: 0
BLURRED VISION: 0
LOSS OF CONSCIOUSNESS: 0
DIAPHORESIS: 0
COUGH: 0
HEARTBURN: 0
BRUISES/BLEEDS EASILY: 0
DEPRESSION: 0
MYALGIAS: 0
BACK PAIN: 0
FEVER: 0
SPUTUM PRODUCTION: 0
CONSTIPATION: 0
PND: 0
EYE REDNESS: 0

## 2018-06-16 ASSESSMENT — LIFESTYLE VARIABLES
TOTAL SCORE: 0
HOW MANY TIMES IN THE PAST YEAR HAVE YOU HAD 5 OR MORE DRINKS IN A DAY: 0
AVERAGE NUMBER OF DAYS PER WEEK YOU HAVE A DRINK CONTAINING ALCOHOL: 1
ALCOHOL_USE: YES
EVER FELT BAD OR GUILTY ABOUT YOUR DRINKING: NO
ON A TYPICAL DAY WHEN YOU DRINK ALCOHOL HOW MANY DRINKS DO YOU HAVE: 0
EVER_SMOKED: NEVER
EVER HAD A DRINK FIRST THING IN THE MORNING TO STEADY YOUR NERVES TO GET RID OF A HANGOVER: NO
HAVE YOU EVER FELT YOU SHOULD CUT DOWN ON YOUR DRINKING: NO
TOTAL SCORE: 0
CONSUMPTION TOTAL: NEGATIVE
HAVE PEOPLE ANNOYED YOU BY CRITICIZING YOUR DRINKING: NO
TOTAL SCORE: 0

## 2018-06-16 ASSESSMENT — PAIN SCALES - GENERAL: PAINLEVEL_OUTOF10: 0

## 2018-06-16 ASSESSMENT — COPD QUESTIONNAIRES
DURING THE PAST 4 WEEKS HOW MUCH DID YOU FEEL SHORT OF BREATH: NONE/LITTLE OF THE TIME
DO YOU EVER COUGH UP ANY MUCUS OR PHLEGM?: NO/ONLY WITH OCCASIONAL COLDS OR INFECTIONS
HAVE YOU SMOKED AT LEAST 100 CIGARETTES IN YOUR ENTIRE LIFE: NO/DON'T KNOW
COPD SCREENING SCORE: 2

## 2018-06-16 ASSESSMENT — PATIENT HEALTH QUESTIONNAIRE - PHQ9
2. FEELING DOWN, DEPRESSED, IRRITABLE, OR HOPELESS: NOT AT ALL
1. LITTLE INTEREST OR PLEASURE IN DOING THINGS: NOT AT ALL
SUM OF ALL RESPONSES TO PHQ9 QUESTIONS 1 AND 2: 0

## 2018-06-16 NOTE — ED PROVIDER NOTES
ED Provider Note    CHIEF COMPLAINT  Chief Complaint   Patient presents with   • Blood in Vomit       HPI  Michelle Moon is a 73 y.o. female who presents with coffee-ground emesis, dark tarry stool.  Stools started 2 days ago, vomiting last night and then again today.  The patient appears pale.  She arrived normotensive, became hypotensive at triage.  I was called emergently to her room for this condition.  The patient denies chest pain, no abdominal pain.  She has distant history of previous stomach ulcer.  No alcoholism, daily glass of wine is usual for her.  No syncope.  Currently hypotensive, she states she was lightheaded standing in triage, this is resolved since being placed on a gurney, supine position.  The patient takes a daily aspirin.    REVIEW OF SYSTEMS    Constitutional: Lightheaded, now resolved.  No fever  Respiratory: No shortness of breath  Cardiac: No chest pain or syncope  Gastrointestinal: Hematemesis, melanotic stool  Musculoskeletal: No acute neck or back pain  Neurologic: Denies headache       All other systems are negative.       PAST MEDICAL HISTORY  Past Medical History:   Diagnosis Date   • Hypertension        FAMILY HISTORY  Family History   Problem Relation Age of Onset   • Hypertension     • Cancer         SOCIAL HISTORY  Social History     Social History   • Marital status:      Spouse name: N/A   • Number of children: N/A   • Years of education: N/A     Social History Main Topics   • Smoking status: Never Smoker   • Smokeless tobacco: Not on file   • Alcohol use Yes      Comment: 3 per week   • Drug use: No   • Sexual activity: Not on file     Other Topics Concern   • Not on file     Social History Narrative   • No narrative on file       SURGICAL HISTORY  Past Surgical History:   Procedure Laterality Date   • IRRIGATION & DEBRIDEMENT ORTHO Right 7/23/2015    Procedure: IRRIGATION & DEBRIDEMENT ORTHO KNEE ;  Surgeon: Sameer Kwan M.D.;  Location: SURGERY [facility]  "ORS;  Service:    • PATELLA ORIF  7/23/2015    Procedure: PATELLA ORIF REVISION;  Surgeon: Sameer Kwan M.D.;  Location: SURGERY [facility];  Service:    • IRRIGATION & DEBRIDEMENT ORTHO Right 7/21/2015    Procedure: IRRIGATION & DEBRIDEMENT ORTHO;  Surgeon: Sameer Kwan M.D.;  Location: SURGERY [facility];  Service:    • HARDWARE REMOVAL ORTHO  7/21/2015    Procedure: HARDWARE REMOVAL PATELLA;  Surgeon: Sameer Kwan M.D.;  Location: SURGERY [facility];  Service:    • SEPTOPLASTY  10/7/2010    Performed by TRISTON SYLVESTER at SURGERY SAME DAY [facility] ORS   • SEPTOPLASTY  4/26/2010    Performed by TRISTON SYLVESTER at SURGERY SAME DAY [facility] ORS   • TURBINATE REDUCTION  4/26/2010    Performed by TRISTON SYLVESTER at SURGERY SAME DAY [facility] ORS   • HYSTERECTOMY LAPAROSCOPY  1994   • PB REDUCTION OF LARGE BREAST         CURRENT MEDICATIONS  Home Medications     Reviewed by Sandee Walters (Pharmacy Tech) on 06/16/18 at 1756  Med List Status: Complete   Medication Last Dose Status   aspirin EC (ECOTRIN) 81 MG Tablet Delayed Response 6/16/2018 Active   ezetimibe (ZETIA) 10 MG Tab 6/16/2018 Active   FLUoxetine (PROZAC) 20 MG Cap 6/16/2018 Active   lisinopril (PRINIVIL) 10 MG TABS 6/16/2018 Active   rosuvastatin (CRESTOR) 20 MG TABS 6/15/2018 Active                ALLERGIES  Allergies   Allergen Reactions   • Nkda [No Known Drug Allergy]        PHYSICAL EXAM  VITAL SIGNS: BP (!) 91/71   Pulse 85   Temp 37.7 °C (99.8 °F)   Resp 18   Ht 1.727 m (5' 8\")   Wt 63.3 kg (139 lb 8.8 oz)   SpO2 99%   BMI 21.22 kg/m²   Constitutional:  Non-toxic appearance.   HENT: No facial swelling, mucous membranes slightly dry  Eyes: Anicteric, no conjunctivitis.     Cardiovascular: Normal heart rate, Normal rhythm  Pulmonary:  No wheezing, No rales.   Gastrointestinal: Soft, No tenderness, No masses.  Bowel sounds active.  Melanotic stool.  Examination of emesis shows small amount coffee-ground " material, no large clots or red blood.  Skin: Warm, Dry, No cyanosis.  Pale.  Neurologic: Speech is clear, follows commands, facial expression is symmetrical.  Strength in the extremities is equal  Psychiatric: Affect normal,  Mood normal.  Patient is calm and cooperative  Musculoskeletal: No flank tenderness    EKG/Labs  Results for orders placed or performed during the hospital encounter of 06/16/18   CBC WITH DIFFERENTIAL   Result Value Ref Range    WBC 9.1 4.8 - 10.8 K/uL    RBC 3.85 (L) 4.20 - 5.40 M/uL    Hemoglobin 12.2 12.0 - 16.0 g/dL    Hematocrit 36.3 (L) 37.0 - 47.0 %    MCV 94.3 81.4 - 97.8 fL    MCH 31.7 27.0 - 33.0 pg    MCHC 33.6 33.6 - 35.0 g/dL    RDW 43.6 35.9 - 50.0 fL    Platelet Count 232 164 - 446 K/uL    MPV 10.6 9.0 - 12.9 fL    Neutrophils-Polys 79.10 (H) 44.00 - 72.00 %    Lymphocytes 15.00 (L) 22.00 - 41.00 %    Monocytes 4.90 0.00 - 13.40 %    Eosinophils 0.10 0.00 - 6.90 %    Basophils 0.20 0.00 - 1.80 %    Immature Granulocytes 0.70 0.00 - 0.90 %    Nucleated RBC 0.00 /100 WBC    Neutrophils (Absolute) 7.21 (H) 2.00 - 7.15 K/uL    Lymphs (Absolute) 1.37 1.00 - 4.80 K/uL    Monos (Absolute) 0.45 0.00 - 0.85 K/uL    Eos (Absolute) 0.01 0.00 - 0.51 K/uL    Baso (Absolute) 0.02 0.00 - 0.12 K/uL    Immature Granulocytes (abs) 0.06 0.00 - 0.11 K/uL    NRBC (Absolute) 0.00 K/uL   COMP METABOLIC PANEL   Result Value Ref Range    Sodium 138 135 - 145 mmol/L    Potassium 4.0 3.6 - 5.5 mmol/L    Chloride 105 96 - 112 mmol/L    Co2 21 20 - 33 mmol/L    Anion Gap 12.0 (H) 0.0 - 11.9    Glucose 171 (H) 65 - 99 mg/dL    Bun 48 (H) 8 - 22 mg/dL    Creatinine 0.56 0.50 - 1.40 mg/dL    Calcium 8.5 8.5 - 10.5 mg/dL    AST(SGOT) 19 12 - 45 U/L    ALT(SGPT) 21 2 - 50 U/L    Alkaline Phosphatase 36 30 - 99 U/L    Total Bilirubin 0.5 0.1 - 1.5 mg/dL    Albumin 4.3 3.2 - 4.9 g/dL    Total Protein 6.8 6.0 - 8.2 g/dL    Globulin 2.5 1.9 - 3.5 g/dL    A-G Ratio 1.7 g/dL   PROTHROMBIN TIME   Result Value Ref  Range    PT 14.4 12.0 - 14.6 sec    INR 1.15 (H) 0.87 - 1.13   APTT   Result Value Ref Range    APTT 24.1 (L) 24.7 - 36.0 sec   TROPONIN   Result Value Ref Range    Troponin I <0.01 0.00 - 0.04 ng/mL   COD (ADULT)   Result Value Ref Range    ABO Grouping Only O     Rh Grouping Only POS     Antibody Screen-Cod NEG     Component R       R7                  Red Blood Cells7    [id number]   issued       18   18:04      Product Type Red Blood Cells LR Pheresis     Dispense Status Issued     Unit Number (Barcoded) [id number]     Product Code (Barcoded) W0984Z01     Blood Type (Barcoded) 5100    ABO AND RH CONFIRMATION   Result Value Ref Range    ABO Confirm O     Second Rh Group POS    ESTIMATED GFR   Result Value Ref Range    GFR If African American >60 >60 mL/min/1.73 m 2    GFR If Non African American >60 >60 mL/min/1.73 m 2   EKG (ER)   Result Value Ref Range    Report       [facility]    Test Date:  2018  Pt Name:    GENEVA NASH               Department: ER  MRN:        [id number]                      Room:       [facility]  Gender:     Female                       Technician: [id number]  :        [id number]                   Requested By:JASMINE ADKINS  Order #:    [id number]                    Reading MD: JASMINE ADKINS MD    Measurements  Intervals                                Axis  Rate:       63                           P:          54  IN:         180                          QRS:        52  QRSD:       88                           T:          -28  QT:         432  QTc:        443    Interpretive Statements  SINUS RHYTHM  EARLY PRECORDIAL R/S TRANSITION  BORDERLINE REPOLARIZATION ABNORMALITY  Compared to ECG 2010 13:21:19  Sinus arrhythmia no longer present  diffuse flattening or inversion multiple t waves  Electronically Signed On 2018 16:56:13 PDT by JASMINE ADKINS MD           RADIOLOGY/PROCEDURES  DX-CHEST-PORTABLE (1 VIEW)   Final Result       Mild linear central opacity suggests bronchial wall thickening which could be seen with acute or chronic bronchitis, reactive airway disease, atypical infection over edema        Conscious Sedation Procedure Note    Indication: Upper GI endoscopy    Consent: I have discussed with the patient and/or the patient representative the indication, alternatives, and the possible risks and/or complications of the planned procedure and the anesthesia methods. The patient and/or patient representative appear to understand and agree to proceed.    Physician Involvement: The attending physician was present and supervising this procedure.    Pre-Sedation Documentation and Exam: I have personally completed a history, physical exam & review of systems for this patient (see notes).  Airway Assessment: normal    Prior History of Anesthesia Complications: none    ASA Classification: Class 2 - A normal healthy patient with mild systemic disease    Sedation/ Anesthesia Plan: intravenous sedation    Medications Used: propofol intravenously    Monitoring and Safety: The patient was placed on a cardiac monitor and vital signs, pulse oximetry and level of consciousness were continuously evaluated throughout the procedure. The patient was closely monitored until recovery from the medications was complete and the patient had returned to baseline status. Respiratory therapy was on standby at all times during the procedure.    (The following sections must be completed)  Post-Sedation Vital Signs: Vital signs were reviewed and were stable after the procedure (see flow sheet for vitals)            Post-Sedation Exam: Lungs: clear and Cardiovascular: normal           Complications: Patient with brief episode of hypoxia, this resolved spontaneously.          COURSE & MEDICAL DECISION MAKING  Pertinent Labs & Imaging studies reviewed. (See chart for details)  Patient was hemodynamically unstable in the setting of GI bleed, 2 L of normal saline  bolus was administered before the treatment of hypotension, initially blood pressure normalized.  With return of hypotension and evidence of active GI hemorrhage, patient was given a unit of packed red blood cells.  Blood pressure is now normalized.    FINAL IMPRESSION     1. Gastrointestinal hemorrhage, unspecified gastrointestinal hemorrhage type    2. Hypotension, unspecified hypotension type             Critical care time is 35 minutes, this was time spent outside of the conscious sedation procedure      Electronically signed by: Blake Khoury, 6/16/2018 7:04 PM

## 2018-06-16 NOTE — ED TRIAGE NOTES
"Pt ambulatory to triage c/o vomited x 1 today and \"it was black\"  Pt states she has not had anything to eat or drink today and denies any pain. Pt denies nausea now. nad. Pt slightly pale. nad.   "

## 2018-06-17 LAB
ANION GAP SERPL CALC-SCNC: 5 MMOL/L (ref 0–11.9)
BUN SERPL-MCNC: 22 MG/DL (ref 8–22)
CALCIUM SERPL-MCNC: 7.4 MG/DL (ref 8.5–10.5)
CHLORIDE SERPL-SCNC: 114 MMOL/L (ref 96–112)
CHOLEST SERPL-MCNC: 100 MG/DL (ref 100–199)
CO2 SERPL-SCNC: 21 MMOL/L (ref 20–33)
CREAT SERPL-MCNC: 0.38 MG/DL (ref 0.5–1.4)
ERYTHROCYTE [DISTWIDTH] IN BLOOD BY AUTOMATED COUNT: 46.7 FL (ref 35.9–50)
ERYTHROCYTE [DISTWIDTH] IN BLOOD BY AUTOMATED COUNT: 47.1 FL (ref 35.9–50)
GLUCOSE SERPL-MCNC: 93 MG/DL (ref 65–99)
HCT VFR BLD AUTO: 27.5 % (ref 37–47)
HCT VFR BLD AUTO: 28 % (ref 37–47)
HDLC SERPL-MCNC: 44 MG/DL
HGB BLD-MCNC: 10.2 G/DL (ref 12–16)
HGB BLD-MCNC: 9 G/DL (ref 12–16)
HGB BLD-MCNC: 9.3 G/DL (ref 12–16)
LDLC SERPL CALC-MCNC: 38 MG/DL
MCH RBC QN AUTO: 30.9 PG (ref 27–33)
MCH RBC QN AUTO: 31.3 PG (ref 27–33)
MCHC RBC AUTO-ENTMCNC: 32.7 G/DL (ref 33.6–35)
MCHC RBC AUTO-ENTMCNC: 33.2 G/DL (ref 33.6–35)
MCV RBC AUTO: 94.3 FL (ref 81.4–97.8)
MCV RBC AUTO: 94.5 FL (ref 81.4–97.8)
PLATELET # BLD AUTO: 131 K/UL (ref 164–446)
PLATELET # BLD AUTO: 131 K/UL (ref 164–446)
PMV BLD AUTO: 10.4 FL (ref 9–12.9)
PMV BLD AUTO: 10.5 FL (ref 9–12.9)
POTASSIUM SERPL-SCNC: 3.5 MMOL/L (ref 3.6–5.5)
RBC # BLD AUTO: 2.91 M/UL (ref 4.2–5.4)
RBC # BLD AUTO: 2.97 M/UL (ref 4.2–5.4)
SODIUM SERPL-SCNC: 140 MMOL/L (ref 135–145)
TRIGL SERPL-MCNC: 92 MG/DL (ref 0–149)
WBC # BLD AUTO: 4.9 K/UL (ref 4.8–10.8)
WBC # BLD AUTO: 6 K/UL (ref 4.8–10.8)

## 2018-06-17 PROCEDURE — G0378 HOSPITAL OBSERVATION PER HR: HCPCS

## 2018-06-17 PROCEDURE — 80048 BASIC METABOLIC PNL TOTAL CA: CPT

## 2018-06-17 PROCEDURE — 700105 HCHG RX REV CODE 258: Performed by: HOSPITALIST

## 2018-06-17 PROCEDURE — 700111 HCHG RX REV CODE 636 W/ 250 OVERRIDE (IP): Performed by: HOSPITALIST

## 2018-06-17 PROCEDURE — 700102 HCHG RX REV CODE 250 W/ 637 OVERRIDE(OP): Performed by: HOSPITALIST

## 2018-06-17 PROCEDURE — 36415 COLL VENOUS BLD VENIPUNCTURE: CPT

## 2018-06-17 PROCEDURE — 302255 BARRIER CREAM MOISTURE BAZA PROTECT (ZINC) 5OZ: Performed by: HOSPITALIST

## 2018-06-17 PROCEDURE — 96376 TX/PRO/DX INJ SAME DRUG ADON: CPT

## 2018-06-17 PROCEDURE — 85027 COMPLETE CBC AUTOMATED: CPT

## 2018-06-17 PROCEDURE — 99225 PR SUBSEQUENT OBSERVATION CARE,LEVEL II: CPT | Performed by: HOSPITALIST

## 2018-06-17 PROCEDURE — A9270 NON-COVERED ITEM OR SERVICE: HCPCS | Performed by: HOSPITALIST

## 2018-06-17 PROCEDURE — 700101 HCHG RX REV CODE 250: Performed by: INTERNAL MEDICINE

## 2018-06-17 PROCEDURE — C9113 INJ PANTOPRAZOLE SODIUM, VIA: HCPCS | Performed by: HOSPITALIST

## 2018-06-17 PROCEDURE — 80061 LIPID PANEL: CPT

## 2018-06-17 PROCEDURE — 85018 HEMOGLOBIN: CPT | Mod: XU

## 2018-06-17 RX ORDER — POTASSIUM CHLORIDE 20 MEQ/1
40 TABLET, EXTENDED RELEASE ORAL ONCE
Status: DISPENSED | OUTPATIENT
Start: 2018-06-17 | End: 2018-06-18

## 2018-06-17 RX ADMIN — ROSUVASTATIN CALCIUM 20 MG: 20 TABLET, FILM COATED ORAL at 20:15

## 2018-06-17 RX ADMIN — PANTOPRAZOLE SODIUM 40 MG: 40 INJECTION, POWDER, LYOPHILIZED, FOR SOLUTION INTRAVENOUS at 08:42

## 2018-06-17 RX ADMIN — PANTOPRAZOLE SODIUM 40 MG: 40 INJECTION, POWDER, LYOPHILIZED, FOR SOLUTION INTRAVENOUS at 20:03

## 2018-06-17 RX ADMIN — POLYETHYLENE GLYCOL 3350, SODIUM SULFATE ANHYDROUS, SODIUM BICARBONATE, SODIUM CHLORIDE, POTASSIUM CHLORIDE 4 L: 236; 22.74; 6.74; 5.86; 2.97 POWDER, FOR SOLUTION ORAL at 18:11

## 2018-06-17 RX ADMIN — SODIUM CHLORIDE: 9 INJECTION, SOLUTION INTRAVENOUS at 21:08

## 2018-06-17 ASSESSMENT — PATIENT HEALTH QUESTIONNAIRE - PHQ9
SUM OF ALL RESPONSES TO PHQ9 QUESTIONS 1 AND 2: 0
2. FEELING DOWN, DEPRESSED, IRRITABLE, OR HOPELESS: NOT AT ALL
1. LITTLE INTEREST OR PLEASURE IN DOING THINGS: NOT AT ALL
2. FEELING DOWN, DEPRESSED, IRRITABLE, OR HOPELESS: NOT AT ALL
1. LITTLE INTEREST OR PLEASURE IN DOING THINGS: NOT AT ALL
SUM OF ALL RESPONSES TO PHQ9 QUESTIONS 1 AND 2: 0

## 2018-06-17 ASSESSMENT — ENCOUNTER SYMPTOMS
WEAKNESS: 0
CHILLS: 0
DOUBLE VISION: 0
GASTROINTESTINAL NEGATIVE: 1
TINGLING: 0
PALPITATIONS: 0
BLURRED VISION: 0
SHORTNESS OF BREATH: 0
HEADACHES: 0
SENSORY CHANGE: 0
DIZZINESS: 0
SPEECH CHANGE: 0
MYALGIAS: 0
DIARRHEA: 0
ABDOMINAL PAIN: 0
FEVER: 0
RESPIRATORY NEGATIVE: 1
TREMORS: 0
NAUSEA: 0
VOMITING: 0
CARDIOVASCULAR NEGATIVE: 1

## 2018-06-17 ASSESSMENT — PAIN SCALES - GENERAL
PAINLEVEL_OUTOF10: 0
PAINLEVEL_OUTOF10: 0

## 2018-06-17 NOTE — ED NOTES
Patient sitting up talking with family.  Denies dizziness or feeling lightheaded at this time.  Will continue to monitor

## 2018-06-17 NOTE — PROGRESS NOTES
Gastroenterology Progress Note     Author: Braden Monge   Date & Time Created: 2018 10:22 AM    Chief Complaint:  GIB    Interval History:  Minimal black streaks no jim melena  Hgb drop from 12 to 10 to 9.   Hemodynamics improved.     Review of Systems:  Review of Systems   Respiratory: Negative.    Cardiovascular: Negative.    Gastrointestinal: Negative.        Physical Exam:  Physical Exam   Cardiovascular: Normal rate.    Pulmonary/Chest: Effort normal.   Abdominal: Soft. Bowel sounds are normal.   Psychiatric: She has a normal mood and affect. Her behavior is normal. Judgment and thought content normal.       Labs:        Invalid input(s): [id number]  Recent Labs      18   1555   TROPONINI  <0.01     Recent Labs      18   1555  18   0200   SODIUM  138  140   POTASSIUM  4.0  3.5*   CHLORIDE  105  114*   CO2  21  21   BUN  48*  22   CREATININE  0.56  0.38*   MAGNESIUM  1.9   --    CALCIUM  8.5  7.4*     Recent Labs      18   1555  18   0200   ALTSGPT  21   --    ASTSGOT  19   --    ALKPHOSPHAT  36   --    TBILIRUBIN  0.5   --    GLUCOSE  171*  93     Recent Labs      18   1555  18   0200  18   1008   RBC  3.85*   --   2.91*   HEMOGLOBIN  12.2  10.2*  9.0*   HEMATOCRIT  36.3*   --   27.5*   PLATELETCT  232   --   131*   PROTHROMBTM  14.4   --    --    APTT  24.1*   --    --    INR  1.15*   --    --      Recent Labs      18   1555  18   1008   WBC  9.1  6.0   NEUTSPOLYS  79.10*   --    LYMPHOCYTES  15.00*   --    MONOCYTES  4.90   --    EOSINOPHILS  0.10   --    BASOPHILS  0.20   --    ASTSGOT  19   --    ALTSGPT  21   --    ALKPHOSPHAT  36   --    TBILIRUBIN  0.5   --      Hemodynamics:  Temp (24hrs), Av.8 °C (98.2 °F), Min:35.9 °C (96.7 °F), Max:37.7 °C (99.8 °F)  Temperature: 36.9 °C (98.5 °F)  Pulse  Av.7  Min: 69  Max: 116Heart Rate (Monitored): 74  Blood Pressure : 107/60, NIBP: (!) 89/67     Respiratory:    Respiration: 18,  Pulse Oximetry: 97 %, O2 Daily Delivery Respiratory : Silicone Nasal Cannula        RUL Breath Sounds: Clear, RML Breath Sounds: Clear, RLL Breath Sounds: Diminished, BRENDA Breath Sounds: Expiratory Wheezes, LLL Breath Sounds: Expiratory Wheezes  Fluids:  No intake or output data in the 24 hours ending 06/17/18 1022  Weight: 65.3 kg (143 lb 15.4 oz)  GI/Nutrition:  Orders Placed This Encounter   Procedures   • Diet NPO     Standing Status:   Standing     Number of Occurrences:   1     Order Specific Question:   Restrict to:     Answer:   Strict [1]     Medical Decision Making, by Problem:  Active Hospital Problems    Diagnosis   • GIB (gastrointestinal bleeding) [K92.2]   • Hypotension [I95.9]       Plan:  GIB with EGD revealing only mild nodularity in the antrum and a small erosion. Hgb decrease from 12 to 10 to 9. Proceed with clear liquids and bowel prep tonight with colonoscopy tomorrow. NPO after midnight. Discussed with patient, family, and RN.     Quality-Core Measures

## 2018-06-17 NOTE — PROGRESS NOTES
Segundo lakhani labs.  Pt ambulated to bathroom without difficulty. Bed in lowest position.  Call light in reach.  Will monitor

## 2018-06-17 NOTE — PROGRESS NOTES
[facility]    Date of Service: 2018    Chief Complaint  73 y.o. female admitted 2018 with melena and coffee ground emesis.    Interval Problem Update  S/P EGD negative for active bleed.  Hgb 9.  Hemodynamically stable.  Melena overall improving.  Denies pain, nausea, or vomiting.  Pending colonoscopy tomorrow.  VSS.    Consultants/Specialty  GI    Disposition  Anticipate home at d/c when medically stable.        Review of Systems   Constitutional: Negative for chills and fever.   HENT: Negative for congestion.    Eyes: Negative for blurred vision and double vision.   Respiratory: Negative for shortness of breath.    Cardiovascular: Negative for chest pain, palpitations and leg swelling.   Gastrointestinal: Positive for melena. Negative for abdominal pain, diarrhea, nausea and vomiting.   Genitourinary: Negative for dysuria.   Musculoskeletal: Negative for myalgias.   Skin: Negative for itching and rash.   Neurological: Negative for dizziness, tingling, tremors, sensory change, speech change, weakness and headaches.      Physical Exam  Laboratory/Imaging   Hemodynamics  Temp (24hrs), Av.8 °C (98.2 °F), Min:35.9 °C (96.7 °F), Max:37.7 °C (99.8 °F)   Temperature: 36.9 °C (98.5 °F)  Pulse  Av.7  Min: 69  Max: 116 Heart Rate (Monitored): 74  Blood Pressure : 107/60, NIBP: (!) 89/67      Respiratory      Respiration: 18, Pulse Oximetry: 97 %, O2 Daily Delivery Respiratory : Silicone Nasal Cannula        RUL Breath Sounds: Clear, RML Breath Sounds: Clear, RLL Breath Sounds: Diminished, BRENDA Breath Sounds: Expiratory Wheezes, LLL Breath Sounds: Expiratory Wheezes    Fluids  No intake or output data in the 24 hours ending 18 1048    Nutrition  Orders Placed This Encounter   Procedures   • DIET ORDER     Standing Status:   Standing     Number of Occurrences:   1     Order Specific Question:   Diet:     Answer:   Clear Liquids - No Red Foods [12]   • DIET NPO     Standing Status:    Standing     Number of Occurrences:   8     Order Specific Question:   Type:     Answer:   At Midnight [5]     Order Specific Question:   Restrict to:     Answer:   Sips with Medications [3]     Physical Exam   Constitutional: She is oriented to person, place, and time. She appears well-developed. No distress.   HENT:   Head: Normocephalic and atraumatic.   Mouth/Throat: No oropharyngeal exudate.   Eyes: Conjunctivae are normal. Right eye exhibits no discharge. Left eye exhibits no discharge.   Neck: Normal range of motion. No tracheal deviation present.   Cardiovascular: Normal rate, regular rhythm, normal heart sounds and intact distal pulses.    No murmur heard.  Pulmonary/Chest: Effort normal and breath sounds normal. No stridor. No respiratory distress. She has no wheezes.   Abdominal: Soft. Bowel sounds are normal. She exhibits no distension. There is no tenderness.   Musculoskeletal: Normal range of motion. She exhibits no edema.   Neurological: She is alert and oriented to person, place, and time. No cranial nerve deficit.   Skin: Skin is warm and dry. No rash noted. She is not diaphoretic. No erythema.   Nursing note and vitals reviewed.      Recent Labs      06/16/18   1555  06/17/18   0200  06/17/18   1008   WBC  9.1   --   6.0   RBC  3.85*   --   2.91*   HEMOGLOBIN  12.2  10.2*  9.0*   HEMATOCRIT  36.3*   --   27.5*   MCV  94.3   --   94.5   MCH  31.7   --   30.9   MCHC  33.6   --   32.7*   RDW  43.6   --   46.7   PLATELETCT  232   --   131*   MPV  10.6   --   10.5     Recent Labs      06/16/18   1555  06/17/18   0200   SODIUM  138  140   POTASSIUM  4.0  3.5*   CHLORIDE  105  114*   CO2  21  21   GLUCOSE  171*  93   BUN  48*  22   CREATININE  0.56  0.38*   CALCIUM  8.5  7.4*     Recent Labs      06/16/18   1555   APTT  24.1*   INR  1.15*         Recent Labs      06/17/18   0200   TRIGLYCERIDE  92   HDL  44   LDL  38          Assessment/Plan     Hypotension- (present on admission)   Assessment & Plan     Improved with IVF.  Continue IV hydration.            GIB (gastrointestinal bleeding)- (present on admission)   Assessment & Plan    Continues to have minimal black stools.  EGD negative for active bleed.  Hgb 9.  Hemodynamically stable.  GI following.  Plan for colonoscopy tomorrow.  Trend hgb.  Transfuse for hgb < 7.  Continue IVF and protonix.          Quality-Core Measures   Reviewed items::  Labs reviewed, Medications reviewed and Radiology images reviewed  Miguel catheter::  No Miguel  DVT prophylaxis pharmacological::  Contraindicated - High bleeding risk  Ulcer Prophylaxis::  Yes

## 2018-06-17 NOTE — OP REPORT
DATE OF SERVICE:  06/16/2018    PROCEDURE PERFORMED:  Esophagogastroduodenoscopy.    INDICATION:  Gastrointestinal bleed.    POSTOPERATIVE DIAGNOSES:  Antral nodularity, status post biopsy, otherwise no   evidence for active gastrointestinal bleed.    PHYSICIAN:  Braden Monge MD.    ER PHYSICIAN ADMINISTERING PROPOFOL:  Blake Thomas MD.    CONSENT:  Procedure risks and benefits reviewed thoroughly with the patient.    Risks including but not limited to bleeding, perforation, side effects of   medication were informed.  Patient voiced understanding and agreed to proceed.    DESCRIPTION OF PROCEDURE:  Patient was placed in a left lateral decubitus   position after sedation was achieved.  Bite block was inserted in the mouth   and a forward viewing gastroscope was passed carefully and easily under direct   visualization into the esophagus.  All esophageal views were normal with the   exception of a benign inlet patch to the proximal esophagus.  The GE junction   was irregular.  Beyond the GE junction was a hiatal hernia measuring 2 cm in   length.  There was no evidence for any Molina's erosions or ulcerations.    Once within the stomach, the stomach was completely devoid of any new or old   blood.  Retroflexed views were normal.  Forward views of the stomach revealed   marked nodularity of the antrum suggestive of possible ulceration, but careful   examination of this nodular area revealed no evidence for jim ulceration.    There was very mild erosion.  Biopsies of this nodularity obtained.  Beyond   this, the pyloric valve was normal.  Duodenal bulb, duodenal sweep, second and   third portions of the duodenum was otherwise normal.  No evidence for new or   old blood.    COMPLICATIONS:  None.    BLOOD LOSS:  None.    SPECIMENS:  Obtained.    RECOMMENDATIONS:  Patient had marked episode of melena with hemodynamic   changes requiring IV fluids and transfusion of blood.  Based on her history,   alcohol  exposure, NSAID exposure and history of peptic ulcer disease,   suspicion for upper gastrointestinal bleed is still quite high and small bowel   bleeding source will be the next likely etiology.  She has had a colonoscopy   in the most recent past.  We will identify the date and discuss the next set   of plans with the family and the patient shortly.  In the meantime, the   patient to continue receiving proton pump inhibitor therapy to reduce acid.    We will start the patient on clears in the Sikhism that we will proceed with   bowel prep for colonoscopy versus nuclear medicine scan to determine the   location before proceeding with further intervention.  The above plan was   reviewed thoroughly with the patient and the patient's family.  All questions   were answered to their satisfaction.       ____________________________________     MD JOHN Garcia / NTS    DD:  06/16/2018 18:51:19  DT:  06/16/2018 20:41:54    D#:  [id number]  Job#:  [id number]

## 2018-06-17 NOTE — CONSULTS
DATE OF SERVICE:  06/16/2018    GASTOENTEROLOGY CONSULTATION    REFERRING PHYSICIAN:  Dr. Khoury.    REASON FOR CONSULTATION:  GI bleed.    HISTORY OF PRESENT ILLNESS:  This is a 73-year-old female with a longstanding   history of orthopedic injuries with chronic knee pain, who has been taking   nonsteroidal anti-inflammatory medications daily for weeks to months as well   as consuming alcohol, typically 1-2 glasses of wine nightly.  She never takes   these medications on an empty stomach. however.  She has a past medical   history significant for peptic ulcer disease.  Her last colonoscopy was   approximately several years ago and is due to have another colonoscopy every 5   years because of polyp history.  She presents to the hospital after having a   large black bowel movement this morning and then a subsequent coffee-ground   emesis thereafter.  The patient presented in the emergency room, was found to   be hypotensive, which corrected with IV fluids.  She then had another   coffee-ground emesis, but this has since resolved over the past 1-2 hours.    Since being in the hospital, she continued to receive IV fluids and is about   to receive a unit of packed red blood cells and has received a bolus of proton   pump inhibitor and 80 mg of Protonix.  Since her admission and these   medications, she is feeling quite a bit better.    PAST MEDICAL HISTORY:  Hypertension.    PAST SURGICAL HISTORY:  1.  Hysterectomy.  2.  Septoplasty.  3.  Reduction of breast.  4.  Irrigation and debridement of the knee.  5.  Open reduction and internal fixation of patella.    FAMILY HISTORY:  Noncontributory.    SOCIAL HISTORY:  No nicotine, no illicit drug use.  Alcohol, 1-2 glasses of   wine per evening.    ALLERGIES:  No known drug allergies.    OUTPATIENT MEDICATIONS:  1.  Lisinopril.  2.  Crestor.  3.  Aleve.    REVIEW OF SYSTEMS:  CONSTITUTIONAL:  No further nausea, vomiting.  No fevers or chills.  CARDIOVASCULAR:  No chest  pain, palpitations.  PULMONARY:  No shortness of breath or dyspnea on exertion.  GASTROINTESTINAL:  As above.  DERMATOLOGY:  No rash or ulcer.  GENITOURINARY:  No dysuria or hematuria.  PSYCHIATRIC:  No suicidal or homicidal ideation.    PHYSICAL EXAMINATION:  VITAL SIGNS:  Temperature afebrile, blood pressure is 91/51, heart rate is 80,   respirations 18.  ABDOMEN:  Soft, nontender, nondistended.  EXTREMITIES:  No edema.    LABORATORY DATA:  White blood cell count 9.1, H and H of 12.2 and 36.3,   platelet count 232.  Sodium 138, potassium 4.0, chloride of 105, BUN and   creatinine of 48/0.56, AST and ALT of 19/21, alk phos of 36, bilirubin of 0.5.    INR of 1.15.    IMPRESSION:  This is a 73-year-old female with history of peptic ulcer disease   and concomitant continued nonsteroidal anti-inflammatory drug use for chronic   knee pain, who also consumes alcohol, 1-2 glasses of wine per evening who   presents with melena this morning, coffee-ground emesis x2 since that episode   with a hemoglobin of 12.2 and an elevated BUN and creatinine ratio of 48/0.56,   who presented hypotensive requiring boluses of saline to correct her blood   pressure, but blood pressure is so tenuous with systolics in the 90s.    Differential diagnosis for her gastrointestinal bleed is most likely upper   gastrointestinal bleed, peptic ulcer disease, gastric versus duodenum.  She   does not have signs of chronic liver disease, less likely to be variceal   etiology.  Ultimately, endoscopic evaluation, specifically   esophagogastroduodenoscopy was reviewed with the patient and family.  Risks   and benefits of procedure reviewed thoroughly with the patient, risks   including but not limited to bleeding, perforation, side effects of medication   were informed.  The patient voiced understanding and agreed to proceed.  We   will proceed with endoscopy here in the emergency room.       ____________________________________     Braden Monge  MD LOPEZ / SHEKHAR    DD:  06/16/2018 18:26:09  DT:  06/16/2018 19:53:06    D#:  [id number]  Job#:  [id number]

## 2018-06-17 NOTE — PROGRESS NOTES
Rounded on patient. Patient appears to be sleeping, rise and fall of chest noted, easy to arouse. Call light and belongings within reach.  at bedside.

## 2018-06-17 NOTE — H&P
Hospital Medicine History & Physical Note    Date of Service  6/16/2018    Primary Care Physician  Blake Del Angel M.D.    Consultants  GI    Code Status  Full code    Chief Complaint  Melena and coffee ground emesis.     History of Presenting Illness  73 y.o. female who presented 6/16/2018 with no significant pmh is coming today due to melena and coffee ground emesis, patient's symptoms started today, patient while waiting in the ER had hypotension she was given ivf and this improved, patient states that she takes aleve on and off for pain and is on low dose ASA since this was recommended to prevent IM or stroke by one of her doctors, patient's hb is stable 12.2, before coming to ER she did not have dizziness, lightheadedness, no rash, no fever or chills, no chest pain, no palpitation, no abdominal pain, no urinary symptoms, no focal weakness, she has no hematuria no hemoptysis, had colonoscopy several years ago that was normal. Gi was consulted and patient had EGD in the ER that showed no source of bleeding, patient to be admitted for close monitoring possible colonoscopy in AM vs bleeding scan, continue hb monitoring.     Review of Systems  Review of Systems   Constitutional: Negative for diaphoresis and fever.   HENT: Negative for hearing loss and tinnitus.    Eyes: Negative for blurred vision and redness.   Respiratory: Negative for cough and sputum production.    Cardiovascular: Negative for palpitations and PND.   Gastrointestinal: Positive for melena. Negative for constipation and heartburn.   Genitourinary: Negative for dysuria and frequency.   Musculoskeletal: Negative for back pain and myalgias.   Skin: Negative for itching.   Neurological: Negative for dizziness and loss of consciousness.   Endo/Heme/Allergies: Negative for environmental allergies. Does not bruise/bleed easily.   Psychiatric/Behavioral: Negative for depression and suicidal ideas.       Past Medical History   has a past medical history  of Hypertension. She also has no past medical history of Breast cancer (HCC).    Surgical History   has a past surgical history that includes hysterectomy laparoscopy (1994); septoplasty (4/26/2010); turbinate reduction (4/26/2010); septoplasty (10/7/2010); pr reduction of large breast; irrigation & debridement ortho (Right, 7/21/2015); hardware removal ortho (7/21/2015); irrigation & debridement ortho (Right, 7/23/2015); and patella orif (7/23/2015).     Family History  No past family history of GI cancer.     Social History   reports that she has never smoked. She does not have any smokeless tobacco history on file. She reports that she drinks alcohol. She reports that she does not use drugs.    Allergies  Allergies   Allergen Reactions   • Nkda [No Known Drug Allergy]        Medications  No current facility-administered medications on file prior to encounter.      Current Outpatient Prescriptions on File Prior to Encounter   Medication Sig Dispense Refill   • lisinopril (PRINIVIL) 10 MG TABS Take 10 mg by mouth every day.     • rosuvastatin (CRESTOR) 20 MG TABS Take 20 mg by mouth every evening.         Physical Exam  Blood Pressure : (!) 74/58   Temperature: 35.9 °C (96.7 °F)   Pulse: 72   Respiration: (!) 28   Pulse Oximetry: 99 %     Physical Exam   Constitutional: She is oriented to person, place, and time. She appears well-developed. No distress.   HENT:   Head: Normocephalic and atraumatic.   Mouth/Throat: No oropharyngeal exudate.   Eyes: Right eye exhibits no discharge. Left eye exhibits no discharge. No scleral icterus.   Conj pale.    Neck: Normal range of motion. Neck supple. No JVD present.   Cardiovascular: Normal rate, regular rhythm and normal heart sounds.    Pulmonary/Chest: Effort normal and breath sounds normal. No stridor. No respiratory distress. She has no wheezes. She has no rales.   Abdominal: Soft. Bowel sounds are normal. She exhibits no distension. There is no tenderness. There is no  rebound.   Musculoskeletal: Normal range of motion. She exhibits no tenderness.   Lymphadenopathy:     She has no cervical adenopathy.   Neurological: She is alert and oriented to person, place, and time. She exhibits normal muscle tone.   Skin: No erythema.   Psychiatric: She has a normal mood and affect.   Nursing note and vitals reviewed.      Laboratory:  Recent Labs      06/16/18   1555   WBC  9.1   RBC  3.85*   HEMOGLOBIN  12.2   HEMATOCRIT  36.3*   MCV  94.3   MCH  31.7   MCHC  33.6   RDW  43.6   PLATELETCT  232   MPV  10.6     Recent Labs      06/16/18   1555   SODIUM  138   POTASSIUM  4.0   CHLORIDE  105   CO2  21   GLUCOSE  171*   BUN  48*   CREATININE  0.56   CALCIUM  8.5     Recent Labs      06/16/18   1555   ALTSGPT  21   ASTSGOT  19   ALKPHOSPHAT  36   TBILIRUBIN  0.5   GLUCOSE  171*     Recent Labs      06/16/18   1555   APTT  24.1*   INR  1.15*             Lab Results   Component Value Date    TROPONINI <0.01 06/16/2018       Urinalysis:    Lab Results   Component Value Date    SPECGRAVITY 1.003 04/19/2016    GLUCOSEUR Negative 04/19/2016    KETONES Negative 04/19/2016    NITRITE Negative 04/19/2016        Imaging:  DX-CHEST-PORTABLE (1 VIEW)   Final Result      Mild linear central opacity suggests bronchial wall thickening which could be seen with acute or chronic bronchitis, reactive airway disease, atypical infection over edema            Assessment/Plan:  I anticipate this patient is appropriate for observation status at this time.    Hypotension- (present on admission)   Assessment & Plan    Could be due to GIB, responded to iv fluids, continue monitoring.         GIB (gastrointestinal bleeding)- (present on admission)   Assessment & Plan    Probably lower gi had EGD in the ER that did not show source of bleeding, patient possible colonoscopy in am vs bleeding scan. Hb stable will continue monitoring hb q8hrs. GI consulted, continue iv protonix.             VTE prophylaxis: scd's

## 2018-06-17 NOTE — DISCHARGE PLANNING
Care Transition Team Assessment    Information Source  Orientation : Oriented x 4  Information Given By: Patient  Who is responsible for making decisions for patient? : Patient    Readmission Evaluation  Is this a readmission?: No    Elopement Risk  Legal Hold: No  Ambulatory or Self Mobile in Wheelchair: No-Not an Elopement Risk    Interdisciplinary Discharge Planning  Does Admitting Nurse Feel This Could be a Complex Discharge?: No  Primary Care Physician: Blake Del Angel  Lives with - Patient's Self Care Capacity: Spouse  Patient or legal guardian wants to designate a caregiver (see row info): Yes  Caregiver name: Jayne Gordillo  Caregiver contact info: [phone]  Support Systems: [employer] [facility] [employer], Family Member(s), Friends / Neighbors  Housing / Facility: 2 Story House  Do You Take your Prescribed Medications Regularly: Yes  Able to Return to Previous ADL's: Yes  Mobility Issues: No  Prior Services: None  Patient Expects to be Discharged to:: Home  Assistance Needed: No  Durable Medical Equipment: Walker (has walker from previous knee sx)    Discharge Preparedness  What is your plan after discharge?: Other (comment) (Home Independent)  What are your discharge supports?: Spouse  Prior Functional Level: Drives Self, Independent with Activities of Daily Living, Independent with Medication Management  Difficulity with ADLs: None    Functional Assesment  Prior Functional Level: Drives Self, Independent with Activities of Daily Living, Independent with Medication Management    Finances  Financial Barriers to Discharge: No  Prescription Coverage: Yes    Vision / Hearing Impairment  Vision Impairment : No  Hearing Impairment : No    Values / Beliefs / Concerns  Values / Beliefs Concerns : No    Advance Directive  Advance Directive?: Living Will, DPOA for Health Care (instructed to bring copy for med record)  Durable Power of  Name and Contact : patient unsure who is DPOA    Domestic Abuse  Have you ever  been the victim of abuse or violence?: No  Physical Abuse or Sexual Abuse: No  Verbal Abuse or Emotional Abuse: No  Possible Abuse Reported to:: Not Applicable    Psychological Assessment  History of Substance Abuse: None  History of Psychiatric Problems: No    Discharge Risks or Barriers  Discharge risks or barriers?: No    Anticipated Discharge Information  Anticipated discharge disposition: Home  Discharge Address: [address] [facility] [address] [facility] [place]  Discharge Contact Phone Number: [phone]

## 2018-06-17 NOTE — PROGRESS NOTES
Assessment completed. Pt A&Ox4. Respirations are even and unlabored on RA, sating 97%. Pt denies pain at this time. Denies nausea. Patient states she has been unable to have BM, reports flatulence and wiping black from rectal area. Monitors applied, VS stable, call light and belongings within reach. POC updated. Pt educated on room and call light, pt verbalized understanding. Communication board updated. Needs met. Family at bedside. Strict NPO.

## 2018-06-17 NOTE — PROGRESS NOTES
Blood transfusion complete.  No adverse effects.  No distress noted.  Call light in reach.  Vital signs stable.  Ambulated to bathroom with no difficulty.  Will monitor

## 2018-06-17 NOTE — CARE PLAN
Problem: Knowledge Deficit  Goal: Knowledge of disease process/condition, treatment plan, diagnostic tests, and medications will improve  Outcome: PROGRESSING AS EXPECTED    Goal: Knowledge of the prescribed therapeutic regimen will improve  Outcome: PROGRESSING AS EXPECTED

## 2018-06-18 VITALS
TEMPERATURE: 98.1 F | DIASTOLIC BLOOD PRESSURE: 61 MMHG | RESPIRATION RATE: 19 BRPM | SYSTOLIC BLOOD PRESSURE: 101 MMHG | BODY MASS INDEX: 21.82 KG/M2 | OXYGEN SATURATION: 98 % | WEIGHT: 143.96 LBS | HEART RATE: 70 BPM | HEIGHT: 68 IN

## 2018-06-18 PROBLEM — I95.9 HYPOTENSION: Status: RESOLVED | Noted: 2018-06-16 | Resolved: 2018-06-18

## 2018-06-18 PROBLEM — K92.2 GIB (GASTROINTESTINAL BLEEDING): Status: RESOLVED | Noted: 2018-06-16 | Resolved: 2018-06-18

## 2018-06-18 LAB
ANION GAP SERPL CALC-SCNC: 4 MMOL/L (ref 0–11.9)
BUN SERPL-MCNC: 7 MG/DL (ref 8–22)
CALCIUM SERPL-MCNC: 8 MG/DL (ref 8.5–10.5)
CHLORIDE SERPL-SCNC: 114 MMOL/L (ref 96–112)
CO2 SERPL-SCNC: 21 MMOL/L (ref 20–33)
CREAT SERPL-MCNC: 0.37 MG/DL (ref 0.5–1.4)
ERYTHROCYTE [DISTWIDTH] IN BLOOD BY AUTOMATED COUNT: 46.6 FL (ref 35.9–50)
ERYTHROCYTE [DISTWIDTH] IN BLOOD BY AUTOMATED COUNT: 47 FL (ref 35.9–50)
EST. AVERAGE GLUCOSE BLD GHB EST-MCNC: 117 MG/DL
GLUCOSE SERPL-MCNC: 98 MG/DL (ref 65–99)
HBA1C MFR BLD: 5.7 % (ref 0–5.6)
HCT VFR BLD AUTO: 28 % (ref 37–47)
HCT VFR BLD AUTO: 28.3 % (ref 37–47)
HGB BLD-MCNC: 9.5 G/DL (ref 12–16)
HGB BLD-MCNC: 9.5 G/DL (ref 12–16)
MCH RBC QN AUTO: 31.6 PG (ref 27–33)
MCH RBC QN AUTO: 31.7 PG (ref 27–33)
MCHC RBC AUTO-ENTMCNC: 33.6 G/DL (ref 33.6–35)
MCHC RBC AUTO-ENTMCNC: 33.9 G/DL (ref 33.6–35)
MCV RBC AUTO: 93 FL (ref 81.4–97.8)
MCV RBC AUTO: 94.3 FL (ref 81.4–97.8)
PLATELET # BLD AUTO: 129 K/UL (ref 164–446)
PLATELET # BLD AUTO: 136 K/UL (ref 164–446)
PMV BLD AUTO: 10.4 FL (ref 9–12.9)
PMV BLD AUTO: 10.4 FL (ref 9–12.9)
POTASSIUM SERPL-SCNC: 3.5 MMOL/L (ref 3.6–5.5)
RBC # BLD AUTO: 3 M/UL (ref 4.2–5.4)
RBC # BLD AUTO: 3.01 M/UL (ref 4.2–5.4)
SODIUM SERPL-SCNC: 139 MMOL/L (ref 135–145)
WBC # BLD AUTO: 4.9 K/UL (ref 4.8–10.8)
WBC # BLD AUTO: 5.1 K/UL (ref 4.8–10.8)

## 2018-06-18 PROCEDURE — 700102 HCHG RX REV CODE 250 W/ 637 OVERRIDE(OP): Performed by: HOSPITALIST

## 2018-06-18 PROCEDURE — 700111 HCHG RX REV CODE 636 W/ 250 OVERRIDE (IP)

## 2018-06-18 PROCEDURE — A9270 NON-COVERED ITEM OR SERVICE: HCPCS | Performed by: HOSPITALIST

## 2018-06-18 PROCEDURE — 700111 HCHG RX REV CODE 636 W/ 250 OVERRIDE (IP): Performed by: HOSPITALIST

## 2018-06-18 PROCEDURE — 99217 PR OBSERVATION CARE DISCHARGE: CPT | Performed by: HOSPITALIST

## 2018-06-18 PROCEDURE — 160203 HCHG ENDO MINUTES - 1ST 30 MINS LEVEL 4: Performed by: INTERNAL MEDICINE

## 2018-06-18 PROCEDURE — 80048 BASIC METABOLIC PNL TOTAL CA: CPT

## 2018-06-18 PROCEDURE — 36415 COLL VENOUS BLD VENIPUNCTURE: CPT

## 2018-06-18 PROCEDURE — 99152 MOD SED SAME PHYS/QHP 5/>YRS: CPT | Mod: XU | Performed by: INTERNAL MEDICINE

## 2018-06-18 PROCEDURE — G0500 MOD SEDAT ENDO SERVICE >5YRS: HCPCS | Mod: XU | Performed by: INTERNAL MEDICINE

## 2018-06-18 PROCEDURE — G0378 HOSPITAL OBSERVATION PER HR: HCPCS

## 2018-06-18 PROCEDURE — 99153 MOD SED SAME PHYS/QHP EA: CPT | Performed by: INTERNAL MEDICINE

## 2018-06-18 PROCEDURE — 160048 HCHG OR STATISTICAL LEVEL 1-5: Performed by: INTERNAL MEDICINE

## 2018-06-18 PROCEDURE — 85027 COMPLETE CBC AUTOMATED: CPT | Mod: 91

## 2018-06-18 PROCEDURE — C9113 INJ PANTOPRAZOLE SODIUM, VIA: HCPCS | Performed by: HOSPITALIST

## 2018-06-18 PROCEDURE — 160002 HCHG RECOVERY MINUTES (STAT): Performed by: INTERNAL MEDICINE

## 2018-06-18 PROCEDURE — 160208 HCHG ENDO MINUTES - EA ADDL 1 MIN LEVEL 4: Performed by: INTERNAL MEDICINE

## 2018-06-18 PROCEDURE — 160035 HCHG PACU - 1ST 60 MINS PHASE I: Performed by: INTERNAL MEDICINE

## 2018-06-18 PROCEDURE — 88305 TISSUE EXAM BY PATHOLOGIST: CPT

## 2018-06-18 RX ORDER — SODIUM CHLORIDE 9 MG/ML
500 INJECTION, SOLUTION INTRAVENOUS
Status: ACTIVE | OUTPATIENT
Start: 2018-06-18 | End: 2018-06-18

## 2018-06-18 RX ORDER — MIDAZOLAM HYDROCHLORIDE 1 MG/ML
.5-2 INJECTION INTRAMUSCULAR; INTRAVENOUS PRN
Status: ACTIVE | OUTPATIENT
Start: 2018-06-18 | End: 2018-06-18

## 2018-06-18 RX ORDER — OMEPRAZOLE 20 MG/1
20 CAPSULE, DELAYED RELEASE ORAL DAILY
Qty: 30 CAP | Refills: 0 | Status: ON HOLD | OUTPATIENT
Start: 2018-06-18 | End: 2019-01-22

## 2018-06-18 RX ORDER — MIDAZOLAM HYDROCHLORIDE 1 MG/ML
INJECTION INTRAMUSCULAR; INTRAVENOUS
Status: DISCONTINUED | OUTPATIENT
Start: 2018-06-18 | End: 2018-06-18 | Stop reason: HOSPADM

## 2018-06-18 RX ADMIN — EZETIMIBE 10 MG: 10 TABLET ORAL at 10:48

## 2018-06-18 RX ADMIN — FLUOXETINE HYDROCHLORIDE 20 MG: 20 CAPSULE ORAL at 10:48

## 2018-06-18 RX ADMIN — PANTOPRAZOLE SODIUM 40 MG: 40 INJECTION, POWDER, LYOPHILIZED, FOR SOLUTION INTRAVENOUS at 10:48

## 2018-06-18 ASSESSMENT — PAIN SCALES - GENERAL
PAINLEVEL_OUTOF10: 0

## 2018-06-18 NOTE — CARE PLAN
Problem: Safety  Goal: Will remain free from injury  Outcome: PROGRESSING AS EXPECTED  Pt instructed to use call light. Pt calls appropriately. Daughter involved in care and at bedside.    Problem: Venous Thromboembolism (VTW)/Deep Vein Thrombosis (DVT) Prevention:  Goal: Patient will participate in Venous Thrombosis (VTE)/Deep Vein Thrombosis (DVT)Prevention Measures    Intervention: Assess and monitor for anticoagulation complications  Pt ambulating frequently to bathroom. Pt agrees to wear SCDs when resting in bed and frequent bowel movements subside.

## 2018-06-18 NOTE — PROGRESS NOTES
Assumed care of pt at 1900. Initial assessment completed. Patient is A&Ox4. Patient denies pain,  dizziness, lightheadedness. Hyperactive bowel sounds heard in all four quadrants; patient denies abdominal tenderness. Patient is up to the bathroom and commode frequently following Golytely intake; denies dark, tarry stools. Patient denies SOB; O2 sat 98% on RA. Patient is SR on monitor with no signs of distress. Daughter at bedside. POC discussed with pt; no further questions at this time. Bed in the lowest position, call light instructions provided and within reach.

## 2018-06-18 NOTE — OP REPORT
DATE OF SERVICE:  6/18/2018     INDICATION FOR PROCEDURE:  GI bleeding     PROCEDURE PERFORMED: Colonoscopy with biopsy     CONSENT:  Informed consent was obtained directly from the patient after benefits, risks and possible alternatives were discussed.     MEDICATIONS:  The patient was received 3mg IV Versed and 75mcg IV Fentanyl for this procedure. Start time was 0821 and end time 0848     PROCEDURE DESCRIPTION:  The patient was placed in the left lateral decubitus position and provided with supplemental oxygen via nasal cannula.  Vital signs were monitored continuously throughout the procedure. After appropriate sedation, FARAZ was performed. The colonoscopy was then introduced into the rectum through the anus. The colonoscope was advanced through the colon under direct visualization to the cecum. The scope was then withdrawn and mucosa examined     FINDINGS:    1)  normal terminal ileum  2) normal colonic mucosa  3) moderately severe diverticulosis in the sigmoid colon  4) (2) 3mm sessile polyps were identified and removed from the ascending colon using a cold forceps        COMPLICATIONS:  No complications or blood loss during or in the immediate postoperative period.     IMPRESSION:  1.  colon polyps and diverticulosis  2. No blood or bleeding source identified     RECOMMENDATION:    1) Suspect small bowel bleed; monitor stools and H/H  2) Likely OK to discharge tonight or tomorrow AM  3) restart diet  4) Avoid NSAIDs

## 2018-06-18 NOTE — DISCHARGE SUMMARY
Discharge Summary    CHIEF COMPLAINT ON ADMISSION  Chief Complaint   Patient presents with   • Blood in Vomit       Reason for Admission  Vomiting Dark fluid     Admission Date  6/16/2018    CODE STATUS  Full Code    HPI & HOSPITAL COURSE  This is a 73 y.o. female here with melena and coffee ground emesis.  She was found to be hypotensive on admission, which resolved with IV fluids.  The patient reports taking daily ASA and as needed aleve at home for pain.  She underwent EGD and colonoscopy which were negative for active bleed.  She was found to have moderate to severe diverticulosis and had 2 sessile polyps removed.  The patient's hemoglobin remained stable.  She had complete resolution of her melena.  She had no vomiting over her stay.  She is tolerating an oral diet with no acute pain.  It is suspected that the patient had a small bowel bleed that has spontaneously resolved.  She is recommended to refrain from NSAID use and will be started on Prilosec.  At this time she has no further need for acute intervention.       Therefore, she is discharged in good and stable condition to home with close outpatient follow-up.    Discharge Date  06/18/2018    FOLLOW UP ITEMS POST DISCHARGE  She will follow with her PCP in 1-2 weeks.    DISCHARGE DIAGNOSES  Active Problems:    * No active hospital problems. *  Resolved Problems:    GIB (gastrointestinal bleeding) POA: Yes    Hypotension POA: Yes      FOLLOW UP  No future appointments.      MEDICATIONS ON DISCHARGE     Medication List      START taking these medications      Instructions   omeprazole 20 MG delayed-release capsule  Commonly known as:  PRILOSEC   Take 1 Cap by mouth every day.  Dose:  20 mg        CONTINUE taking these medications      Instructions   CRESTOR 20 MG Tabs  Generic drug:  rosuvastatin   Take 20 mg by mouth every evening.  Dose:  20 mg     ezetimibe 10 MG Tabs  Commonly known as:  ZETIA   Take 10 mg by mouth every day.  Dose:  10 mg     FLUoxetine  20 MG Caps  Commonly known as:  PROZAC   Take 20 mg by mouth every day.  Dose:  20 mg     lisinopril 10 MG Tabs  Commonly known as:  PRINIVIL   Take 10 mg by mouth every day.  Dose:  10 mg        STOP taking these medications    aspirin EC 81 MG Tbec  Commonly known as:  ECOTRIN            Allergies  Allergies   Allergen Reactions   • Nkda [No Known Drug Allergy]        DIET  Orders Placed This Encounter   Procedures   • DIET ORDER     Standing Status:   Standing     Number of Occurrences:   1     Order Specific Question:   Diet:     Answer:   Regular [1]       ACTIVITY  As tolerated.    CONSULTATIONS  GI - Dr. Monge    PROCEDURES  EGD  Colonoscopy    LABORATORY  Lab Results   Component Value Date    SODIUM 139 06/18/2018    POTASSIUM 3.5 (L) 06/18/2018    CHLORIDE 114 (H) 06/18/2018    CO2 21 06/18/2018    GLUCOSE 98 06/18/2018    BUN 7 (L) 06/18/2018    CREATININE 0.37 (L) 06/18/2018        Lab Results   Component Value Date    WBC 4.9 06/18/2018    HEMOGLOBIN 9.5 (L) 06/18/2018    HEMATOCRIT 28.3 (L) 06/18/2018    PLATELETCT 129 (L) 06/18/2018        Total time of the discharge process was 33 minutes.

## 2018-06-18 NOTE — PROGRESS NOTES
Pt discharged to home. IV d/c'd, pt armband removed. Pt and family understand written and verbal d/c instructions.  Prescription sent electronically to pt verified pharmacy.  Regular diet, activity as tolerated.  Pt to follow up with PCP and GI.  Pt verbalized understanding.

## 2018-06-18 NOTE — DISCHARGE INSTRUCTIONS
Discharge Instructions    Discharged to home by car with relative. Discharged via walking, hospital escort: Yes.  Special equipment needed: Not Applicable    Be sure to schedule a follow-up appointment with your primary care doctor or any specialists as instructed.     Discharge Plan:   Diet Plan: Discussed (Regular)  Activity Level: Discussed (As tolerated)  Confirmed Follow up Appointment: Patient to Call and Schedule Appointment  Confirmed Symptoms Management: Discussed  Medication Reconciliation Updated: Yes  Influenza Vaccine Indication: Not indicated: Previously immunized this influenza season and > 8 years of age    I understand that a diet low in cholesterol, fat, and sodium is recommended for good health. Unless I have been given specific instructions below for another diet, I accept this instruction as my diet prescription.   Other diet: Regular    Special Instructions: None    · Is patient discharged on Warfarin / Coumadin?   No     Depression / Suicide Risk    As you are discharged from this [facility] facility, it is important to learn how to keep safe from harming yourself.    Recognize the warning signs:  · Abrupt changes in personality, positive or negative- including increase in energy   · Giving away possessions  · Change in eating patterns- significant weight changes-  positive or negative  · Change in sleeping patterns- unable to sleep or sleeping all the time   · Unwillingness or inability to communicate  · Depression  · Unusual sadness, discouragement and loneliness  · Talk of wanting to die  · Neglect of personal appearance   · Rebelliousness- reckless behavior  · Withdrawal from people/activities they love  · Confusion- inability to concentrate     If you or a loved one observes any of these behaviors or has concerns about self-harm, here's what you can do:  · Talk about it- your feelings and reasons for harming yourself  · Remove any means that you might use to hurt yourself (examples:  pills, rope, extension cords, firearm)  · Get professional help from the community (Mental Health, Substance Abuse, psychological counseling)  · Do not be alone:Call your Safe Contact- someone whom you trust who will be there for you.  · Call your local CRISIS HOTLINE 317-7923 or 377-474-4947  · Call your local Children's Mobile Crisis Response Team [place] (980) 411-7550 or www.[employer]  · Call the toll free National Suicide Prevention Hotlines   · National Suicide Prevention Lifeline 459-334-OGZZ (0495)  · National Hope Line Network 800-SUICIDE (648-9950)

## 2018-06-18 NOTE — PROGRESS NOTES
Report received, assumed patient care.  Pt A&OX4.  Daughter at bedside.  Assessment completed.  Call light within reach, personal belongings available, bed in lowest position, pt calling for assistance.  Pt reports no pain.  Pt is NPO for coloscopy this AM.  Communication board updated, POC discussed.  Monitors reapplied, VSS.  No additional needs at this time.

## 2018-07-19 ENCOUNTER — HOSPITAL ENCOUNTER (OUTPATIENT)
Dept: LAB | Facility: MEDICAL CENTER | Age: 73
End: 2018-07-19
Attending: FAMILY MEDICINE
Payer: MEDICARE

## 2018-07-19 LAB
BASOPHILS # BLD AUTO: 0.4 % (ref 0–1.8)
BASOPHILS # BLD: 0.02 K/UL (ref 0–0.12)
EOSINOPHIL # BLD AUTO: 0.07 K/UL (ref 0–0.51)
EOSINOPHIL NFR BLD: 1.3 % (ref 0–6.9)
ERYTHROCYTE [DISTWIDTH] IN BLOOD BY AUTOMATED COUNT: 46.3 FL (ref 35.9–50)
HCT VFR BLD AUTO: 41.1 % (ref 37–47)
HGB BLD-MCNC: 13.2 G/DL (ref 12–16)
IMM GRANULOCYTES # BLD AUTO: 0.02 K/UL (ref 0–0.11)
IMM GRANULOCYTES NFR BLD AUTO: 0.4 % (ref 0–0.9)
LYMPHOCYTES # BLD AUTO: 1.51 K/UL (ref 1–4.8)
LYMPHOCYTES NFR BLD: 28.2 % (ref 22–41)
MCH RBC QN AUTO: 30.5 PG (ref 27–33)
MCHC RBC AUTO-ENTMCNC: 32.1 G/DL (ref 33.6–35)
MCV RBC AUTO: 94.9 FL (ref 81.4–97.8)
MONOCYTES # BLD AUTO: 0.45 K/UL (ref 0–0.85)
MONOCYTES NFR BLD AUTO: 8.4 % (ref 0–13.4)
NEUTROPHILS # BLD AUTO: 3.29 K/UL (ref 2–7.15)
NEUTROPHILS NFR BLD: 61.3 % (ref 44–72)
NRBC # BLD AUTO: 0 K/UL
NRBC BLD-RTO: 0 /100 WBC
PLATELET # BLD AUTO: 218 K/UL (ref 164–446)
PMV BLD AUTO: 11.1 FL (ref 9–12.9)
RBC # BLD AUTO: 4.33 M/UL (ref 4.2–5.4)
WBC # BLD AUTO: 5.4 K/UL (ref 4.8–10.8)

## 2018-07-19 PROCEDURE — 36415 COLL VENOUS BLD VENIPUNCTURE: CPT

## 2018-07-19 PROCEDURE — 85025 COMPLETE CBC W/AUTO DIFF WBC: CPT

## 2018-08-02 ENCOUNTER — HOSPITAL ENCOUNTER (OUTPATIENT)
Dept: LAB | Facility: MEDICAL CENTER | Age: 73
End: 2018-08-02
Attending: FAMILY MEDICINE
Payer: MEDICARE

## 2018-08-02 LAB
BASOPHILS # BLD AUTO: 0.2 % (ref 0–1.8)
BASOPHILS # BLD: 0.01 K/UL (ref 0–0.12)
EOSINOPHIL # BLD AUTO: 0.07 K/UL (ref 0–0.51)
EOSINOPHIL NFR BLD: 1.5 % (ref 0–6.9)
ERYTHROCYTE [DISTWIDTH] IN BLOOD BY AUTOMATED COUNT: 46 FL (ref 35.9–50)
HCT VFR BLD AUTO: 38.6 % (ref 37–47)
HGB BLD-MCNC: 12.4 G/DL (ref 12–16)
IMM GRANULOCYTES # BLD AUTO: 0.01 K/UL (ref 0–0.11)
IMM GRANULOCYTES NFR BLD AUTO: 0.2 % (ref 0–0.9)
LYMPHOCYTES # BLD AUTO: 1.21 K/UL (ref 1–4.8)
LYMPHOCYTES NFR BLD: 26.5 % (ref 22–41)
MCH RBC QN AUTO: 30 PG (ref 27–33)
MCHC RBC AUTO-ENTMCNC: 32.1 G/DL (ref 33.6–35)
MCV RBC AUTO: 93.5 FL (ref 81.4–97.8)
MONOCYTES # BLD AUTO: 0.36 K/UL (ref 0–0.85)
MONOCYTES NFR BLD AUTO: 7.9 % (ref 0–13.4)
NEUTROPHILS # BLD AUTO: 2.9 K/UL (ref 2–7.15)
NEUTROPHILS NFR BLD: 63.7 % (ref 44–72)
NRBC # BLD AUTO: 0 K/UL
NRBC BLD-RTO: 0 /100 WBC
PLATELET # BLD AUTO: 197 K/UL (ref 164–446)
PMV BLD AUTO: 11 FL (ref 9–12.9)
RBC # BLD AUTO: 4.13 M/UL (ref 4.2–5.4)
WBC # BLD AUTO: 4.6 K/UL (ref 4.8–10.8)

## 2018-08-02 PROCEDURE — 85025 COMPLETE CBC W/AUTO DIFF WBC: CPT

## 2018-08-02 PROCEDURE — 36415 COLL VENOUS BLD VENIPUNCTURE: CPT

## 2018-11-12 ENCOUNTER — APPOINTMENT (RX ONLY)
Dept: URBAN - METROPOLITAN AREA CLINIC 4 | Facility: CLINIC | Age: 73
Setting detail: DERMATOLOGY
End: 2018-11-12

## 2018-11-12 DIAGNOSIS — D22 MELANOCYTIC NEVI: ICD-10-CM

## 2018-11-12 DIAGNOSIS — L57.0 ACTINIC KERATOSIS: ICD-10-CM

## 2018-11-12 DIAGNOSIS — L81.4 OTHER MELANIN HYPERPIGMENTATION: ICD-10-CM

## 2018-11-12 DIAGNOSIS — L82.1 OTHER SEBORRHEIC KERATOSIS: ICD-10-CM

## 2018-11-12 DIAGNOSIS — D18.0 HEMANGIOMA: ICD-10-CM

## 2018-11-12 PROBLEM — D18.01 HEMANGIOMA OF SKIN AND SUBCUTANEOUS TISSUE: Status: ACTIVE | Noted: 2018-11-12

## 2018-11-12 PROBLEM — D22.61 MELANOCYTIC NEVI OF RIGHT UPPER LIMB, INCLUDING SHOULDER: Status: ACTIVE | Noted: 2018-11-12

## 2018-11-12 PROBLEM — D48.5 NEOPLASM OF UNCERTAIN BEHAVIOR OF SKIN: Status: ACTIVE | Noted: 2018-11-12

## 2018-11-12 PROBLEM — E78.5 HYPERLIPIDEMIA, UNSPECIFIED: Status: ACTIVE | Noted: 2018-11-12

## 2018-11-12 PROBLEM — D22.71 MELANOCYTIC NEVI OF RIGHT LOWER LIMB, INCLUDING HIP: Status: ACTIVE | Noted: 2018-11-12

## 2018-11-12 PROBLEM — F41.9 ANXIETY DISORDER, UNSPECIFIED: Status: ACTIVE | Noted: 2018-11-12

## 2018-11-12 PROBLEM — D22.72 MELANOCYTIC NEVI OF LEFT LOWER LIMB, INCLUDING HIP: Status: ACTIVE | Noted: 2018-11-12

## 2018-11-12 PROBLEM — D22.5 MELANOCYTIC NEVI OF TRUNK: Status: ACTIVE | Noted: 2018-11-12

## 2018-11-12 PROBLEM — I10 ESSENTIAL (PRIMARY) HYPERTENSION: Status: ACTIVE | Noted: 2018-11-12

## 2018-11-12 PROBLEM — D22.62 MELANOCYTIC NEVI OF LEFT UPPER LIMB, INCLUDING SHOULDER: Status: ACTIVE | Noted: 2018-11-12

## 2018-11-12 PROBLEM — F32.9 MAJOR DEPRESSIVE DISORDER, SINGLE EPISODE, UNSPECIFIED: Status: ACTIVE | Noted: 2018-11-12

## 2018-11-12 PROCEDURE — 99213 OFFICE O/P EST LOW 20 MIN: CPT | Mod: 25

## 2018-11-12 PROCEDURE — 17003 DESTRUCT PREMALG LES 2-14: CPT

## 2018-11-12 PROCEDURE — 17000 DESTRUCT PREMALG LESION: CPT

## 2018-11-12 PROCEDURE — ? COUNSELING

## 2018-11-12 PROCEDURE — ? BIOPSY BY SHAVE METHOD

## 2018-11-12 PROCEDURE — ? LIQUID NITROGEN

## 2018-11-12 PROCEDURE — 11100: CPT | Mod: 59

## 2018-11-12 PROCEDURE — ? SUNSCREEN RECOMMENDATIONS

## 2018-11-12 ASSESSMENT — LOCATION ZONE DERM
LOCATION ZONE: HAND
LOCATION ZONE: FACE
LOCATION ZONE: TRUNK
LOCATION ZONE: NECK
LOCATION ZONE: ARM
LOCATION ZONE: NOSE
LOCATION ZONE: LEG

## 2018-11-12 ASSESSMENT — LOCATION SIMPLE DESCRIPTION DERM
LOCATION SIMPLE: LEFT ANTERIOR NECK
LOCATION SIMPLE: RIGHT HAND
LOCATION SIMPLE: RIGHT FOREARM
LOCATION SIMPLE: LEFT CHEEK
LOCATION SIMPLE: LEFT FOREARM
LOCATION SIMPLE: LEFT HAND
LOCATION SIMPLE: UPPER BACK
LOCATION SIMPLE: LEFT THIGH
LOCATION SIMPLE: NOSE
LOCATION SIMPLE: RIGHT CHEEK
LOCATION SIMPLE: RIGHT THIGH
LOCATION SIMPLE: RIGHT LOWER BACK
LOCATION SIMPLE: RIGHT POSTERIOR UPPER ARM
LOCATION SIMPLE: LEFT UPPER BACK
LOCATION SIMPLE: ABDOMEN
LOCATION SIMPLE: LEFT POSTERIOR UPPER ARM

## 2018-11-12 ASSESSMENT — LOCATION DETAILED DESCRIPTION DERM
LOCATION DETAILED: NASAL ROOT
LOCATION DETAILED: NASAL DORSUM
LOCATION DETAILED: LEFT CENTRAL MALAR CHEEK
LOCATION DETAILED: LEFT ULNAR DORSAL HAND
LOCATION DETAILED: RIGHT CENTRAL MALAR CHEEK
LOCATION DETAILED: RIGHT ANTERIOR PROXIMAL THIGH
LOCATION DETAILED: RIGHT PROXIMAL DORSAL FOREARM
LOCATION DETAILED: RIGHT RADIAL DORSAL HAND
LOCATION DETAILED: LEFT SUPERIOR MEDIAL UPPER BACK
LOCATION DETAILED: LEFT INFERIOR ANTERIOR NECK
LOCATION DETAILED: SUPERIOR THORACIC SPINE
LOCATION DETAILED: LEFT PROXIMAL POSTERIOR UPPER ARM
LOCATION DETAILED: RIGHT RIB CAGE
LOCATION DETAILED: RIGHT SUPERIOR MEDIAL MIDBACK
LOCATION DETAILED: PERIUMBILICAL SKIN
LOCATION DETAILED: LEFT ANTERIOR PROXIMAL THIGH
LOCATION DETAILED: RIGHT DISTAL POSTERIOR UPPER ARM
LOCATION DETAILED: LEFT PROXIMAL DORSAL FOREARM

## 2018-11-12 NOTE — PROCEDURE: BIOPSY BY SHAVE METHOD
Was A Bandage Applied: Yes
Bill For Surgical Tray: no
Consent: Written consent was obtained and risks were reviewed including but not limited to scarring, infection, bleeding, scabbing, incomplete removal, nerve damage and allergy to anesthesia.
Electrodesiccation Text: The wound bed was treated with electrodesiccation after the biopsy was performed.
Lab Facility: 
Additional Anesthesia Volume In Cc (Will Not Render If 0): 0
Hemostasis: Drysol
Biopsy Method: Personna blade
Post-Care Instructions: I reviewed with the patient in detail post-care instructions. Patient is to keep the biopsy site dry overnight, and then apply bacitracin twice daily until healed. Patient may apply hydrogen peroxide soaks to remove any crusting.
Silver Nitrate Text: The wound bed was treated with silver nitrate after the biopsy was performed.
Curettage Text: The wound bed was treated with curettage after the biopsy was performed.
Anesthesia Volume In Cc: 2
Billing Type: Third-Party Bill
Type Of Destruction Used: Curettage
Cryotherapy Text: The wound bed was treated with cryotherapy after the biopsy was performed.
Notification Instructions: Patient will be notified of biopsy results. However, patient instructed to call the office if not contacted within 2 weeks.
Detail Level: Detailed
Anesthesia Type: 1% lidocaine with epinephrine
Wound Care: Bacitracin
Lab: [id number]
Depth Of Biopsy: dermis
Biopsy Type: H and E
Dressing: bandage
Electrodesiccation And Curettage Text: The wound bed was treated with electrodesiccation and curettage after the biopsy was performed.

## 2018-11-12 NOTE — HPI: FULL BODY SKIN EXAMINATION
How Severe Are Your Spot(S)?: moderate
What Type Of Note Output Would You Prefer (Optional)?: Standard Output
What Is The Reason For Today's Visit?: Full Body Skin Examination
What Is The Reason For Today's Visit? (Being Monitored For X): the risk of recurrence of previously treated lesion(s)
Additional History: Lesion on nose for 2-3 months. FBE.

## 2018-11-12 NOTE — PROCEDURE: LIQUID NITROGEN
Number Of Freeze-Thaw Cycles: 2 freeze-thaw cycles
Duration Of Freeze Thaw-Cycle (Seconds): 3
Render Post-Care Instructions In Note?: yes
Detail Level: Simple
Post-Care Instructions: I reviewed with the patient in detail post-care instructions. Patient is to wear sunprotection, and avoid picking at any of the treated lesions. Pt may apply Vaseline to crusted or scabbing areas.
Consent: The patient's consent was obtained including but not limited to risks of crusting, scabbing, blistering, scarring, darker or lighter pigmentary change, recurrence, incomplete removal and infection.

## 2019-01-22 ENCOUNTER — HOSPITAL ENCOUNTER (OUTPATIENT)
Facility: MEDICAL CENTER | Age: 74
End: 2019-01-22
Attending: ORTHOPAEDIC SURGERY | Admitting: ORTHOPAEDIC SURGERY
Payer: MEDICARE

## 2019-01-22 ENCOUNTER — APPOINTMENT (OUTPATIENT)
Dept: RADIOLOGY | Facility: MEDICAL CENTER | Age: 74
End: 2019-01-22
Attending: ORTHOPAEDIC SURGERY
Payer: MEDICARE

## 2019-01-22 VITALS
WEIGHT: 142.64 LBS | HEIGHT: 68 IN | RESPIRATION RATE: 16 BRPM | OXYGEN SATURATION: 95 % | TEMPERATURE: 97.6 F | BODY MASS INDEX: 21.62 KG/M2 | HEART RATE: 68 BPM

## 2019-01-22 DIAGNOSIS — S82.841A BIMALLEOLAR ANKLE FRACTURE, RIGHT, CLOSED, INITIAL ENCOUNTER: ICD-10-CM

## 2019-01-22 LAB
ANION GAP SERPL CALC-SCNC: 10 MMOL/L (ref 0–11.9)
BUN SERPL-MCNC: 13 MG/DL (ref 8–22)
CALCIUM SERPL-MCNC: 10.3 MG/DL (ref 8.5–10.5)
CHLORIDE SERPL-SCNC: 99 MMOL/L (ref 96–112)
CO2 SERPL-SCNC: 28 MMOL/L (ref 20–33)
CREAT SERPL-MCNC: 0.58 MG/DL (ref 0.5–1.4)
EKG IMPRESSION: NORMAL
GLUCOSE SERPL-MCNC: 102 MG/DL (ref 65–99)
POTASSIUM SERPL-SCNC: 3.6 MMOL/L (ref 3.6–5.5)
SODIUM SERPL-SCNC: 137 MMOL/L (ref 135–145)

## 2019-01-22 PROCEDURE — 160029 HCHG SURGERY MINUTES - 1ST 30 MINS LEVEL 4: Performed by: ORTHOPAEDIC SURGERY

## 2019-01-22 PROCEDURE — 700101 HCHG RX REV CODE 250

## 2019-01-22 PROCEDURE — 700102 HCHG RX REV CODE 250 W/ 637 OVERRIDE(OP): Performed by: ANESTHESIOLOGY

## 2019-01-22 PROCEDURE — 73610 X-RAY EXAM OF ANKLE: CPT | Mod: RT

## 2019-01-22 PROCEDURE — C1713 ANCHOR/SCREW BN/BN,TIS/BN: HCPCS | Performed by: ORTHOPAEDIC SURGERY

## 2019-01-22 PROCEDURE — 503036 HCHG GUIDE PIN,OIC: Performed by: ORTHOPAEDIC SURGERY

## 2019-01-22 PROCEDURE — 160025 RECOVERY II MINUTES (STATS): Performed by: ORTHOPAEDIC SURGERY

## 2019-01-22 PROCEDURE — 160048 HCHG OR STATISTICAL LEVEL 1-5: Performed by: ORTHOPAEDIC SURGERY

## 2019-01-22 PROCEDURE — 500891 HCHG PACK, ORTHO MAJOR: Performed by: ORTHOPAEDIC SURGERY

## 2019-01-22 PROCEDURE — 700111 HCHG RX REV CODE 636 W/ 250 OVERRIDE (IP)

## 2019-01-22 PROCEDURE — A9270 NON-COVERED ITEM OR SERVICE: HCPCS | Performed by: ANESTHESIOLOGY

## 2019-01-22 PROCEDURE — 160002 HCHG RECOVERY MINUTES (STAT): Performed by: ORTHOPAEDIC SURGERY

## 2019-01-22 PROCEDURE — 160035 HCHG PACU - 1ST 60 MINS PHASE I: Performed by: ORTHOPAEDIC SURGERY

## 2019-01-22 PROCEDURE — 160041 HCHG SURGERY MINUTES - EA ADDL 1 MIN LEVEL 4: Performed by: ORTHOPAEDIC SURGERY

## 2019-01-22 PROCEDURE — 93010 ELECTROCARDIOGRAM REPORT: CPT | Performed by: INTERNAL MEDICINE

## 2019-01-22 PROCEDURE — 501838 HCHG SUTURE GENERAL: Performed by: ORTHOPAEDIC SURGERY

## 2019-01-22 PROCEDURE — 160022 HCHG BLOCK: Performed by: ORTHOPAEDIC SURGERY

## 2019-01-22 PROCEDURE — 80048 BASIC METABOLIC PNL TOTAL CA: CPT

## 2019-01-22 PROCEDURE — 160046 HCHG PACU - 1ST 60 MINS PHASE II: Performed by: ORTHOPAEDIC SURGERY

## 2019-01-22 PROCEDURE — 93005 ELECTROCARDIOGRAM TRACING: CPT | Performed by: ORTHOPAEDIC SURGERY

## 2019-01-22 PROCEDURE — 160009 HCHG ANES TIME/MIN: Performed by: ORTHOPAEDIC SURGERY

## 2019-01-22 DEVICE — PLATE DISTAL FIBULA 5H (2TX2+2TX1=6): Type: IMPLANTABLE DEVICE | Site: ANKLE | Status: FUNCTIONAL

## 2019-01-22 DEVICE — SCREW 2.5 MM NON-LOCKING TI X 20MM LONG (6TX8=48): Type: IMPLANTABLE DEVICE | Site: ANKLE | Status: FUNCTIONAL

## 2019-01-22 DEVICE — SCREW 3.5 MM NON-LOCKING TI X 10MM LONG (6TX8+2TX5=58): Type: IMPLANTABLE DEVICE | Site: ANKLE | Status: FUNCTIONAL

## 2019-01-22 DEVICE — SCREW 3.5 MM NON-LOCKING TI X 12MM LONG (6TX8+2TX5=58): Type: IMPLANTABLE DEVICE | Site: ANKLE | Status: FUNCTIONAL

## 2019-01-22 DEVICE — SCREW 3.5 MM LOCKING TI X 12MM LONG (6TX8+2TX5=58): Type: IMPLANTABLE DEVICE | Site: ANKLE | Status: FUNCTIONAL

## 2019-01-22 DEVICE — SCREW 2.5 MM LOCKING TI X 16MM LONG (6TX8=48): Type: IMPLANTABLE DEVICE | Site: ANKLE | Status: FUNCTIONAL

## 2019-01-22 DEVICE — SCREW 2.5 MM LOCKING TI X 12MM LONG (6TX8=48): Type: IMPLANTABLE DEVICE | Site: ANKLE | Status: FUNCTIONAL

## 2019-01-22 DEVICE — SCREW CANN 4.0X46 LONG OIC - (3TX2=6): Type: IMPLANTABLE DEVICE | Site: ANKLE | Status: FUNCTIONAL

## 2019-01-22 DEVICE — WIRE 1.6MMX150MM K-WIRE (10 PACK) (8TX10=80): Type: IMPLANTABLE DEVICE | Site: ANKLE | Status: FUNCTIONAL

## 2019-01-22 RX ORDER — HYDROMORPHONE HYDROCHLORIDE 1 MG/ML
0.2 INJECTION, SOLUTION INTRAMUSCULAR; INTRAVENOUS; SUBCUTANEOUS
Status: DISCONTINUED | OUTPATIENT
Start: 2019-01-22 | End: 2019-01-22 | Stop reason: HOSPADM

## 2019-01-22 RX ORDER — OXYCODONE HCL 5 MG/5 ML
5 SOLUTION, ORAL ORAL
Status: DISCONTINUED | OUTPATIENT
Start: 2019-01-22 | End: 2019-01-22 | Stop reason: HOSPADM

## 2019-01-22 RX ORDER — CELECOXIB 200 MG/1
200 CAPSULE ORAL ONCE
Status: COMPLETED | OUTPATIENT
Start: 2019-01-22 | End: 2019-01-22

## 2019-01-22 RX ORDER — OXYCODONE HYDROCHLORIDE 5 MG/1
5 TABLET ORAL EVERY 4 HOURS PRN
Qty: 40 TAB | Refills: 0 | Status: SHIPPED | OUTPATIENT
Start: 2019-01-22 | End: 2019-01-29

## 2019-01-22 RX ORDER — SODIUM CHLORIDE, SODIUM LACTATE, POTASSIUM CHLORIDE, CALCIUM CHLORIDE 600; 310; 30; 20 MG/100ML; MG/100ML; MG/100ML; MG/100ML
1000 INJECTION, SOLUTION INTRAVENOUS
Status: COMPLETED | OUTPATIENT
Start: 2019-01-22 | End: 2019-01-22

## 2019-01-22 RX ORDER — ACETAMINOPHEN 500 MG
1000 TABLET ORAL ONCE
Status: COMPLETED | OUTPATIENT
Start: 2019-01-22 | End: 2019-01-22

## 2019-01-22 RX ORDER — LISINOPRIL 20 MG/1
20 TABLET ORAL DAILY
COMMUNITY

## 2019-01-22 RX ORDER — ONDANSETRON 2 MG/ML
4 INJECTION INTRAMUSCULAR; INTRAVENOUS
Status: DISCONTINUED | OUTPATIENT
Start: 2019-01-22 | End: 2019-01-22 | Stop reason: HOSPADM

## 2019-01-22 RX ORDER — DIPHENHYDRAMINE HYDROCHLORIDE 50 MG/ML
12.5 INJECTION INTRAMUSCULAR; INTRAVENOUS
Status: DISCONTINUED | OUTPATIENT
Start: 2019-01-22 | End: 2019-01-22 | Stop reason: HOSPADM

## 2019-01-22 RX ORDER — OMEPRAZOLE 20 MG/1
20 CAPSULE, DELAYED RELEASE ORAL DAILY
COMMUNITY

## 2019-01-22 RX ORDER — HYDROMORPHONE HYDROCHLORIDE 1 MG/ML
0.4 INJECTION, SOLUTION INTRAMUSCULAR; INTRAVENOUS; SUBCUTANEOUS
Status: DISCONTINUED | OUTPATIENT
Start: 2019-01-22 | End: 2019-01-22 | Stop reason: HOSPADM

## 2019-01-22 RX ORDER — ROSUVASTATIN CALCIUM 40 MG/1
40 TABLET, COATED ORAL EVERY EVENING
COMMUNITY
End: 2024-03-04

## 2019-01-22 RX ORDER — OXYCODONE HCL 10 MG/1
10 TABLET, FILM COATED, EXTENDED RELEASE ORAL ONCE
Status: COMPLETED | OUTPATIENT
Start: 2019-01-22 | End: 2019-01-22

## 2019-01-22 RX ORDER — SODIUM CHLORIDE, SODIUM LACTATE, POTASSIUM CHLORIDE, CALCIUM CHLORIDE 600; 310; 30; 20 MG/100ML; MG/100ML; MG/100ML; MG/100ML
INJECTION, SOLUTION INTRAVENOUS CONTINUOUS
Status: DISCONTINUED | OUTPATIENT
Start: 2019-01-22 | End: 2019-01-22 | Stop reason: HOSPADM

## 2019-01-22 RX ORDER — GABAPENTIN 300 MG/1
300 CAPSULE ORAL ONCE
Status: COMPLETED | OUTPATIENT
Start: 2019-01-22 | End: 2019-01-22

## 2019-01-22 RX ORDER — MEPERIDINE HYDROCHLORIDE 25 MG/ML
6.25 INJECTION INTRAMUSCULAR; INTRAVENOUS; SUBCUTANEOUS
Status: DISCONTINUED | OUTPATIENT
Start: 2019-01-22 | End: 2019-01-22 | Stop reason: HOSPADM

## 2019-01-22 RX ORDER — LIDOCAINE HYDROCHLORIDE 10 MG/ML
INJECTION, SOLUTION INFILTRATION; PERINEURAL
Status: COMPLETED
Start: 2019-01-22 | End: 2019-01-22

## 2019-01-22 RX ORDER — OXYCODONE HCL 5 MG/5 ML
10 SOLUTION, ORAL ORAL
Status: DISCONTINUED | OUTPATIENT
Start: 2019-01-22 | End: 2019-01-22 | Stop reason: HOSPADM

## 2019-01-22 RX ORDER — HYDROMORPHONE HYDROCHLORIDE 1 MG/ML
0.1 INJECTION, SOLUTION INTRAMUSCULAR; INTRAVENOUS; SUBCUTANEOUS
Status: DISCONTINUED | OUTPATIENT
Start: 2019-01-22 | End: 2019-01-22 | Stop reason: HOSPADM

## 2019-01-22 RX ORDER — HALOPERIDOL 5 MG/ML
1 INJECTION INTRAMUSCULAR
Status: DISCONTINUED | OUTPATIENT
Start: 2019-01-22 | End: 2019-01-22 | Stop reason: HOSPADM

## 2019-01-22 RX ORDER — BUPIVACAINE HYDROCHLORIDE AND EPINEPHRINE 5; 5 MG/ML; UG/ML
INJECTION, SOLUTION EPIDURAL; INTRACAUDAL; PERINEURAL
Status: DISCONTINUED | OUTPATIENT
Start: 2019-01-22 | End: 2019-01-22 | Stop reason: HOSPADM

## 2019-01-22 RX ADMIN — GABAPENTIN 300 MG: 300 CAPSULE ORAL at 12:23

## 2019-01-22 RX ADMIN — ACETAMINOPHEN 1000 MG: 500 TABLET, FILM COATED ORAL at 12:22

## 2019-01-22 RX ADMIN — OXYCODONE HYDROCHLORIDE 10 MG: 10 TABLET, FILM COATED, EXTENDED RELEASE ORAL at 12:22

## 2019-01-22 RX ADMIN — LIDOCAINE HYDROCHLORIDE 0.5 ML: 10 INJECTION, SOLUTION INFILTRATION; PERINEURAL at 11:54

## 2019-01-22 RX ADMIN — SODIUM CHLORIDE, SODIUM LACTATE, POTASSIUM CHLORIDE, CALCIUM CHLORIDE 1000 ML: 600; 310; 30; 20 INJECTION, SOLUTION INTRAVENOUS at 11:54

## 2019-01-22 RX ADMIN — Medication 0.5 ML: at 11:54

## 2019-01-22 RX ADMIN — CELECOXIB 200 MG: 200 CAPSULE ORAL at 12:23

## 2019-01-22 ASSESSMENT — PAIN SCALES - GENERAL
PAINLEVEL_OUTOF10: 0

## 2019-01-22 NOTE — OP REPORT
DATE OF SERVICE:  01/22/2019    PREOPERATIVE DIAGNOSIS:  Right bimalleolar ankle fracture.    POSTOPERATIVE DIAGNOSIS:  Right bimalleolar ankle fracture.    PROCEDUREs:  1.  Open reduction and internal fixation of right bimalleolar ankle fracture.  2.  Physician-applied stress radiograph, right ankle.    SURGEON:  Rajan Griffin MD    ASSISTANT:  Jeffy Winkler DO    ANESTHESIOLOGIST:  Carlos Werner DO    ANESTHESIA TYPE:  General and regional.    SPECIMENS:  None.    ESTIMATED BLOOD LOSS:  Minimal.    COMPLICATIONS:  None.    TOURNIQUET TIME:  Less than 1 hour at 250 mmHg.    OPERATIVE INDICATIONS:  The patient is a pleasant 73-year-old female who   sustained a mechanical ground-level fall and subsequent right bimalleolar   ankle fracture.  She has a normal skin envelope and normal neurovascular exam.    Given these findings, she is an appropriately indicated candidate for open   reduction and internal fixation of her fracture.  We discussed risks,   benefits, alternatives including the risk of infection, wound healing   complications, neurovascular injury, blood loss, DVT, PE, malunion, nonunion,   symptomatic hardware and the medical risks of anesthesia.  We discussed   benefits of the surgery including improved chance of union, acceptable   alignment and alternatives including nonoperative management, which was not   recommended.  Her informed consent was signed and documented.  I met with her   preoperatively and marked the operative extremity.    OPERATIVE COURSE:  She underwent general anesthesia as well as regional   anesthesia at my request for postoperative pain control by Dr. Werner.  She   was positioned supine with a bump on the right buttock and I reopened the   right leg.  Right lower extremity was prepped and draped in sterile orthopedic   fashion using ChloraPrep after application of a nonsterile tourniquet.  The   surgical team scrubbed in.  A procedural pause was conducted to verify  correct   patient, correct extremity, presence of the surgeon's initials on the   operative extremity, and administration of IV antibiotics, in this case Ancef.    Following generalized agreement, approach to the medial malleolus was   performed with a curvilinear incision posteriorly at the tip of the medial   malleolus, dissected down, preserved the saphenous vein.  Fracture site was   identified and debrided of excess periosteum and then reduced with a pointed   reduction forceps.  We then secured the fracture  with 2 guidewires of the [facility]   4.0 mm cannulated screw system.  We confirmed reduction and position of the   wires in AP and lateral fluoroscopy and then placed two 46 mm long threaded   and partially threaded cannulated screws across the fracture.  These were   tightened by hand.  Wires were removed.  Fluoroscopic images demonstrated good   placement and appropriate depth of the screws.  We then thoroughly irrigated   the medial wound and closed with 2-0 Vicryl and 3-0 nylon sutures.  We turned   our attention laterally.  A subcutaneous approach to the fibula was performed   incising the skin and subcutaneous tissue sharply, dissected down to the   fracture site, which was debrided minimally.  The superficial peroneal nerve   was identified anteriorly and preserved.  The fracture was reduced with a   lobster claw reduction forceps and secured with a 2.5 mm lag screw, which   achieved some, but not ideal purchase.  This was supplemented with a K-wire   across the fracture site.  We then selected a 5-hole [facility] lateral fibular   locking plate and positioned this within the wound.  This was carried proximal   and distal to the fracture with a combination of 2.5 mm locking screws   distally and 3.5 mm nonlocking and locking screws proximally.  An external   rotation stress test was performed, which did not demonstrate any widening of   the clear space.  As such, no syndesmotic fixation was placed.  We then    thoroughly irrigated and closed in layers with 2-0 Vicryl and 3-0 nylon   sutures.  The medial wound was anesthetized with 10 mL of 0.5% Marcaine to   supplement our block.  We then placed sterile dressings and a posterior splint   with stirrups was applied.  Patient was transferred to the recovery room in   stable condition, sustaining no complications.    POSTOPERATIVE PLAN:  1.  Toe touch weightbearing to operative extremity until followup visit.    Follow up at the [facility] in 3 weeks for conversion to a boot   and initiation weightbearing and range of motion.  2.  DVT prophylaxis with SCDs in the hospital, none upon discharge.  3.  IV antibiotics - none.  4.  Discharge home when criteria met.  Follow up with the [facility] in 3 weeks for suture removal.       ____________________________________     MD JO Vickers / NTS    DD:  01/22/2019 14:44:06  DT:  01/22/2019 15:41:38    D#:  [id number]  Job#:  [id number]

## 2019-01-22 NOTE — DISCHARGE INSTRUCTIONS
ACTIVITY: Rest and take it easy for the first 24 hours.  A responsible adult is recommended to remain with you during that time.  It is normal to feel sleepy.  We encourage you to not do anything that requires balance, judgment or coordination.    MILD FLU-LIKE SYMPTOMS ARE NORMAL. YOU MAY EXPERIENCE GENERALIZED MUSCLE ACHES, THROAT IRRITATION, HEADACHE AND/OR SOME NAUSEA.    FOR 24 HOURS DO NOT:  Drive, operate machinery or run household appliances.  Drink beer or alcoholic beverages.   Make important decisions or sign legal documents.    SPECIAL INSTRUCTIONS:      Ankle Fracture Treated With ORIF, Care After  Refer to this sheet in the next few weeks. These instructions provide you with information about caring for yourself after your procedure. Your health care provider may also give you more specific instructions. Your treatment has been planned according to current medical practices, but problems sometimes occur. Call your health care provider if you have any problems or questions after your procedure.  WHAT TO EXPECT AFTER THE PROCEDURE:  After your procedure, it is common to have:   · Pain.  · Swelling.  HOME CARE INSTRUCTIONS  If You Have a Cast:   · Do not stick anything inside the cast to scratch your skin. Doing that increases your risk of infection.  · Check the skin around the cast every day. Report any concerns to your health care provider. You may put lotion on dry skin around the edges of the cast. Do not apply lotion to the skin underneath the cast.  · Do not put pressure on any part of the cast until it is fully hardened. This may take several hours.  · Keep the cast clean and dry.  If You Have a Splint or a Boot:    · Wear it as directed by your health care provider. Remove it only as directed by your health care provider.  · Loosen the splint or boot if your toes become numb and tingle, or if they turn cold and blue.  · Do not pressure on any part of the splint or boot until it is fully  hardened. This may take several hours.  · Keep the splint or boot clean and dry.  Bathing   · Do not take baths, swim, or use a hot tub until your health care provider approves. Ask your health care provider if you can take showers. You may only be allowed to take sponge baths for bathing.  · If your health care provider approves bathing and showering, cover the cast or splint with a watertight plastic bag to protect it from water. Do not let the cast or splint get wet.  · Keep the bandage (dressing) dry until your health care provider says it can be removed.  Incision Care  · There are many ways to close and cover an incision. For example, an incision can be closed with stitches (sutures), skin glue, or adhesive strips. Follow instructions from your health care provider about:  ¨ How to take care of your incision.  ¨ When and how you should change your bandage (dressing).  ¨ When you should remove your dressing.  ¨ Removing whatever was used to close your incision.  · Check your incision area every day for signs of infection. Watch for:  ¨ Redness, swelling, or pain.  ¨ Fluid, blood, or pus.  Managing Pain, Stiffness, and Swelling    · Move your toes often to avoid stiffness and to lessen swelling.  · Raise (elevate) the injured area above the level of your heart while you are sitting or lying down.  · If directed, apply ice to the injured area (if you have a splint or a boot, not a cast).  ¨ Put ice in a plastic bag.  ¨ Place a towel between your skin and the bag.  ¨ Leave the ice on for 20 minutes, 2-3 times per day.  Driving   · Do not drive or operate heavy machinery while taking pain medicine.  · Do not drive while wearing a cast, splint, or boot on a foot that you use for driving.  Activity   · Return to your normal activities as directed by your health care provider. Ask your health care provider what activities are safe for you.  · Perform exercises daily as directed by your health care provider or physical  therapist.  Safety  · Do not walk on the injured ankle and do not use it to support your body weight until your health care provider says that you can. Follow these instructions exactly to prevent problems. Use crutches or other supportive devices as directed by your health care provider.  General Instructions   · Do not use any tobacco products, including cigarettes, chewing tobacco, or e-cigarettes. Tobacco can delay bone healing. If you need help quitting, ask your health care provider.  · Take medicines only as directed by your health care provider.  · Keep all follow-up visits as directed by your health care provider. This is important.  SEEK MEDICAL CARE IF:  · You have a fever.  · Your pain medicine is not helping.  · You have redness, swelling, or pain at the site of your incision.  · You have fluid, blood, or pus coming from your incision or seeping through your cast.  · You notice a bad smell coming from your incision or your dressing.  SEEK IMMEDIATE MEDICAL CARE IF:  · You have chest pain.  · You have difficulty breathing.  · You have numbness or tingling in your foot or leg.  · Your foot becomes cold, pale, or blue.     This information is not intended to replace advice given to you by your health care provider. Make sure you discuss any questions you have with your health care provider.     DIET: To avoid nausea, slowly advance diet as tolerated, avoiding spicy or greasy foods for the first day.  Add more substantial food to your diet according to your physician's instructions. INCREASE FLUIDS AND FIBER TO AVOID CONSTIPATION.    SURGICAL DRESSING/BATHING: Keep dressing clean and dry until follow up visit.    FOLLOW-UP APPOINTMENT:  A follow-up appointment should be arranged with your doctor; call to schedule.    You should CALL YOUR PHYSICIAN if you develop:  Fever greater than 101 degrees F.  Pain not relieved by medication, or persistent nausea or vomiting.  Excessive bleeding (blood soaking through  dressing) or unexpected drainage from the wound.  Extreme redness or swelling around the incision site, drainage of pus or foul smelling drainage.  Inability to urinate or empty your bladder within 8 hours.  Problems with breathing or chest pain.    You should call 911 if you develop problems with breathing or chest pain.  If you are unable to contact your doctor or surgical center, you should go to the nearest emergency room or urgent care center.  Physician's telephone #: Dr. Griffin [phone]    If any questions arise, call your doctor.  If your doctor is not available, please feel free to call the Surgical Center at [phone].  The Center is open Monday through Friday from 7AM to 7PM.  You can also call the [employer] HOTLINE open 24 hours/day, 7 days/week and speak to a nurse at [phone], or toll free at [phone].    A registered nurse may call you a few days after your surgery to see how you are doing after your procedure.    MEDICATIONS: Resume taking daily medication.  Take prescribed pain medication with food.  If no medication is prescribed, you may take non-aspirin pain medication if needed.  PAIN MEDICATION CAN BE VERY CONSTIPATING.  Take a stool softener or laxative such as senokot, pericolace, or milk of magnesia if needed.    Prescription given for oxycodone 5 mg.  May take at any time.    If your physician has prescribed pain medication that includes Acetaminophen (Tylenol), do not take additional Acetaminophen (Tylenol) while taking the prescribed medication.    Depression / Suicide Risk    As you are discharged from this [facility] facility, it is important to learn how to keep safe from harming yourself.    Recognize the warning signs:  · Abrupt changes in personality, positive or negative- including increase in energy   · Giving away possessions  · Change in eating patterns- significant weight changes-  positive or negative  · Change in sleeping patterns- unable to sleep or  sleeping all the time   · Unwillingness or inability to communicate  · Depression  · Unusual sadness, discouragement and loneliness  · Talk of wanting to die  · Neglect of personal appearance   · Rebelliousness- reckless behavior  · Withdrawal from people/activities they love  · Confusion- inability to concentrate     If you or a loved one observes any of these behaviors or has concerns about self-harm, here's what you can do:  · Talk about it- your feelings and reasons for harming yourself  · Remove any means that you might use to hurt yourself (examples: pills, rope, extension cords, firearm)  · Get professional help from the community (Mental Health, Substance Abuse, psychological counseling)  · Do not be alone:Call your Safe Contact- someone whom you trust who will be there for you.  · Call your local CRISIS HOTLINE 326-4628 or 787-796-6956  · Call your local Children's Mobile Crisis Response Team [place] (448) 997-4386 or www.[employer]  · Call the toll free National Suicide Prevention Hotlines   · National Suicide Prevention Lifeline 575-425-ZRYH (1639)  · National Hope Line Network 800-SUICIDE (143-2502)

## 2019-03-28 ENCOUNTER — OFFICE VISIT (OUTPATIENT)
Dept: URGENT CARE | Facility: CLINIC | Age: 74
End: 2019-03-28
Payer: MEDICARE

## 2019-03-28 VITALS
BODY MASS INDEX: 20.92 KG/M2 | RESPIRATION RATE: 14 BRPM | WEIGHT: 138 LBS | HEIGHT: 68 IN | TEMPERATURE: 98 F | SYSTOLIC BLOOD PRESSURE: 130 MMHG | DIASTOLIC BLOOD PRESSURE: 80 MMHG | HEART RATE: 67 BPM | OXYGEN SATURATION: 97 %

## 2019-03-28 DIAGNOSIS — S61.211A LACERATION OF LEFT INDEX FINGER WITHOUT FOREIGN BODY WITHOUT DAMAGE TO NAIL, INITIAL ENCOUNTER: ICD-10-CM

## 2019-03-28 PROCEDURE — 12001 RPR S/N/AX/GEN/TRNK 2.5CM/<: CPT | Performed by: FAMILY MEDICINE

## 2019-03-29 NOTE — PROGRESS NOTES
"Subjective:      Michelle Moon is a 73 y.o. female who presents with Laceration (LT finger)            This is a new problem.  73-year-old presenting today for evaluation of left index finger laceration which happened by accident when she was cutting.  Immunizations are up-to-date.  No other injury.        ROS       Objective:     /80 (BP Location: Right arm, Patient Position: Sitting, BP Cuff Size: Adult)   Pulse 67   Temp 36.7 °C (98 °F)   Resp 14   Ht 1.727 m (5' 8\")   Wt 62.6 kg (138 lb)   SpO2 97%   BMI 20.98 kg/m²      Physical Exam   Constitutional: She is oriented to person, place, and time. She appears well-developed and well-nourished. No distress.   HENT:   Head: Normocephalic and atraumatic.   Eyes: Conjunctivae are normal.   Cardiovascular: Normal rate.    Pulmonary/Chest: Effort normal. No stridor. No respiratory distress.   Musculoskeletal:        Left hand: She exhibits laceration (More than 1 cm laceration noted which is superficial at the very end of left index finger as outlined in the picture.  No active bleeding.). She exhibits normal range of motion, normal capillary refill, no deformity and no swelling. Normal sensation noted. Normal strength noted.        Hands:  Neurological: She is alert and oriented to person, place, and time.   Skin: Skin is warm. She is not diaphoretic. No pallor.   Psychiatric: She has a normal mood and affect.                Assessment/Plan:     1. Laceration of left index finger without foreign body without damage to nail, initial encounter    Wound management options were discussed with the patient and/or parent and they agree to proceed.     Anesthesia:  NONE  Irrigation/Prep: saline   Closure: 3 Steri-Strip was placed   Dressing applied as appropriate for wound location and size by nursing.    Home care, signs/symptoms of infection and suture removal guidelines were discussed with patient/parent.   Plan as outlined above and per patient " instruction  Warning signs reviewed.

## 2019-03-29 NOTE — PATIENT INSTRUCTIONS
Laceration Care, Adult  A laceration is a cut that goes through all of the layers of the skin and into the tissue that is right under the skin. Some lacerations heal on their own. Others need to be closed with stitches (sutures), staples, skin adhesive strips, or skin glue. Proper laceration care minimizes the risk of infection and helps the laceration to heal better.  HOW TO CARE FOR YOUR LACERATION  If skin adhesive strips were used:  · Keep the wound clean and dry.  · If you were given a bandage (dressing), you should change it at least one time per day or as told by your health care provider. You should also change it if it becomes dirty or wet.  · Do not get the skin adhesive strips wet. You may shower or bathe, but be careful to keep the wound dry.  · If the wound gets wet, pat it dry with a clean towel. Do not rub the wound.  · Skin adhesive strips fall off on their own. You may trim the strips as the wound heals. Do not remove skin adhesive strips that are still stuck to the wound. They will fall off in time.  ·   General Instructions  · Take over-the-counter and prescription medicines only as told by your health care provider.  · If you were prescribed an antibiotic medicine or ointment, take or apply it as told by your doctor. Do not stop using it even if your condition improves.  · To help prevent scarring, make sure to cover your wound with sunscreen whenever you are outside after stitches are removed, after adhesive strips are removed, or when glue remains in place and the wound is healed. Make sure to wear a sunscreen of at least 30 SPF.  · Do not scratch or pick at the wound.  · Keep all follow-up visits as told by your health care provider. This is important.  · Check your wound every day for signs of infection. Watch for:  ¨ Redness, swelling, or pain.  ¨ Fluid, blood, or pus.  · Raise (elevate) the injured area above the level of your heart while you are sitting or lying down, if possible.  SEEK  MEDICAL CARE IF:  · You received a tetanus shot and you have swelling, severe pain, redness, or bleeding at the injection site.  · You have a fever.  · A wound that was closed breaks open.  · You notice a bad smell coming from your wound or your dressing.  · You notice something coming out of the wound, such as wood or glass.  · Your pain is not controlled with medicine.  · You have increased redness, swelling, or pain at the site of your wound.  · You have fluid, blood, or pus coming from your wound.  · You notice a change in the color of your skin near your wound.  · You need to change the dressing frequently due to fluid, blood, or pus draining from the wound.  · You develop a new rash.  · You develop numbness around the wound.  SEEK IMMEDIATE MEDICAL CARE IF:  · You develop severe swelling around the wound.  · Your pain suddenly increases and is severe.  · You develop painful lumps near the wound or on skin that is anywhere on your body.  · You have a red streak going away from your wound.  · The wound is on your hand or foot and you cannot properly move a finger or toe.  · The wound is on your hand or foot and you notice that your fingers or toes look pale or bluish.     This information is not intended to replace advice given to you by your health care provider. Make sure you discuss any questions you have with your health care provider.     Document Released: 12/18/2006 Document Revised: 05/03/2016 Document Reviewed: 12/14/2015  [employer] Interactive Patient Education ©2016 [employer]

## 2019-10-09 ENCOUNTER — IMMUNIZATION (OUTPATIENT)
Dept: SOCIAL WORK | Facility: CLINIC | Age: 74
End: 2019-10-09
Payer: MEDICARE

## 2019-10-09 DIAGNOSIS — Z23 NEED FOR VACCINATION: ICD-10-CM

## 2019-10-09 PROCEDURE — 90662 IIV NO PRSV INCREASED AG IM: CPT | Performed by: REGISTERED NURSE

## 2019-10-09 PROCEDURE — G0008 ADMIN INFLUENZA VIRUS VAC: HCPCS | Performed by: REGISTERED NURSE

## 2019-10-22 ENCOUNTER — HOSPITAL ENCOUNTER (OUTPATIENT)
Dept: LAB | Facility: MEDICAL CENTER | Age: 74
End: 2019-10-22
Attending: FAMILY MEDICINE
Payer: MEDICARE

## 2019-10-22 LAB
ALBUMIN SERPL BCP-MCNC: 4.3 G/DL (ref 3.2–4.9)
ALBUMIN/GLOB SERPL: 1.8 G/DL
ALP SERPL-CCNC: 65 U/L (ref 30–99)
ALT SERPL-CCNC: 20 U/L (ref 2–50)
ANION GAP SERPL CALC-SCNC: 10 MMOL/L (ref 0–11.9)
APPEARANCE UR: CLEAR
AST SERPL-CCNC: 21 U/L (ref 12–45)
BASOPHILS # BLD AUTO: 0.2 % (ref 0–1.8)
BASOPHILS # BLD: 0.01 K/UL (ref 0–0.12)
BILIRUB SERPL-MCNC: 0.5 MG/DL (ref 0.1–1.5)
BILIRUB UR QL STRIP.AUTO: NEGATIVE
BUN SERPL-MCNC: 12 MG/DL (ref 8–22)
CALCIUM SERPL-MCNC: 9.2 MG/DL (ref 8.5–10.5)
CHLORIDE SERPL-SCNC: 106 MMOL/L (ref 96–112)
CHOLEST SERPL-MCNC: 227 MG/DL (ref 100–199)
CK SERPL-CCNC: 116 U/L (ref 0–154)
CO2 SERPL-SCNC: 26 MMOL/L (ref 20–33)
COLOR UR: YELLOW
CREAT SERPL-MCNC: 0.59 MG/DL (ref 0.5–1.4)
CRP SERPL HS-MCNC: 0.06 MG/DL (ref 0–0.75)
EOSINOPHIL # BLD AUTO: 0.04 K/UL (ref 0–0.51)
EOSINOPHIL NFR BLD: 0.7 % (ref 0–6.9)
ERYTHROCYTE [DISTWIDTH] IN BLOOD BY AUTOMATED COUNT: 46.7 FL (ref 35.9–50)
GLOBULIN SER CALC-MCNC: 2.4 G/DL (ref 1.9–3.5)
GLUCOSE SERPL-MCNC: 90 MG/DL (ref 65–99)
GLUCOSE UR STRIP.AUTO-MCNC: NEGATIVE MG/DL
HCT VFR BLD AUTO: 46 % (ref 37–47)
HDLC SERPL-MCNC: 74 MG/DL
HGB BLD-MCNC: 14.8 G/DL (ref 12–16)
IMM GRANULOCYTES # BLD AUTO: 0.01 K/UL (ref 0–0.11)
IMM GRANULOCYTES NFR BLD AUTO: 0.2 % (ref 0–0.9)
KETONES UR STRIP.AUTO-MCNC: NEGATIVE MG/DL
LDLC SERPL CALC-MCNC: 132 MG/DL
LEUKOCYTE ESTERASE UR QL STRIP.AUTO: NEGATIVE
LYMPHOCYTES # BLD AUTO: 1.36 K/UL (ref 1–4.8)
LYMPHOCYTES NFR BLD: 24.7 % (ref 22–41)
MCH RBC QN AUTO: 31.3 PG (ref 27–33)
MCHC RBC AUTO-ENTMCNC: 32.2 G/DL (ref 33.6–35)
MCV RBC AUTO: 97.3 FL (ref 81.4–97.8)
MICRO URNS: NORMAL
MONOCYTES # BLD AUTO: 0.48 K/UL (ref 0–0.85)
MONOCYTES NFR BLD AUTO: 8.7 % (ref 0–13.4)
NEUTROPHILS # BLD AUTO: 3.6 K/UL (ref 2–7.15)
NEUTROPHILS NFR BLD: 65.5 % (ref 44–72)
NITRITE UR QL STRIP.AUTO: NEGATIVE
NRBC # BLD AUTO: 0 K/UL
NRBC BLD-RTO: 0 /100 WBC
PH UR STRIP.AUTO: 6 [PH] (ref 5–8)
PLATELET # BLD AUTO: 233 K/UL (ref 164–446)
PMV BLD AUTO: 11 FL (ref 9–12.9)
POTASSIUM SERPL-SCNC: 4 MMOL/L (ref 3.6–5.5)
PROT SERPL-MCNC: 6.7 G/DL (ref 6–8.2)
PROT UR QL STRIP: NEGATIVE MG/DL
RBC # BLD AUTO: 4.73 M/UL (ref 4.2–5.4)
RBC UR QL AUTO: NEGATIVE
SODIUM SERPL-SCNC: 142 MMOL/L (ref 135–145)
SP GR UR STRIP.AUTO: 1.02
TRIGL SERPL-MCNC: 106 MG/DL (ref 0–149)
UROBILINOGEN UR STRIP.AUTO-MCNC: 0.2 MG/DL
WBC # BLD AUTO: 5.5 K/UL (ref 4.8–10.8)

## 2019-10-22 PROCEDURE — 80061 LIPID PANEL: CPT

## 2019-10-22 PROCEDURE — 80053 COMPREHEN METABOLIC PANEL: CPT

## 2019-10-22 PROCEDURE — 86140 C-REACTIVE PROTEIN: CPT

## 2019-10-22 PROCEDURE — 84443 ASSAY THYROID STIM HORMONE: CPT

## 2019-10-22 PROCEDURE — 81003 URINALYSIS AUTO W/O SCOPE: CPT

## 2019-10-22 PROCEDURE — 83036 HEMOGLOBIN GLYCOSYLATED A1C: CPT | Mod: GA

## 2019-10-22 PROCEDURE — 36415 COLL VENOUS BLD VENIPUNCTURE: CPT

## 2019-10-22 PROCEDURE — 85025 COMPLETE CBC W/AUTO DIFF WBC: CPT

## 2019-10-22 PROCEDURE — 82550 ASSAY OF CK (CPK): CPT

## 2019-10-23 LAB
EST. AVERAGE GLUCOSE BLD GHB EST-MCNC: 120 MG/DL
HBA1C MFR BLD: 5.8 % (ref 0–5.6)
TSH SERPL DL<=0.005 MIU/L-ACNC: 1.05 UIU/ML (ref 0.38–5.33)

## 2019-12-02 ENCOUNTER — OFFICE VISIT (OUTPATIENT)
Dept: URGENT CARE | Facility: MEDICAL CENTER | Age: 74
End: 2019-12-02
Payer: MEDICARE

## 2019-12-02 VITALS
TEMPERATURE: 98.4 F | RESPIRATION RATE: 16 BRPM | HEIGHT: 68 IN | HEART RATE: 68 BPM | DIASTOLIC BLOOD PRESSURE: 88 MMHG | SYSTOLIC BLOOD PRESSURE: 120 MMHG | WEIGHT: 150.8 LBS | BODY MASS INDEX: 22.85 KG/M2 | OXYGEN SATURATION: 97 %

## 2019-12-02 DIAGNOSIS — S01.01XA SCALP LACERATION, INITIAL ENCOUNTER: Primary | ICD-10-CM

## 2019-12-03 PROCEDURE — 12001 RPR S/N/AX/GEN/TRNK 2.5CM/<: CPT | Performed by: PHYSICIAN ASSISTANT

## 2019-12-03 NOTE — PROCEDURES
Laceration Repair  Date/Time: 12/3/2019 12:28 PM  Performed by: Daryn Springer P.A.-C.  Authorized by: Daryn Springer P.A.-C.   Body area: head/neck  Location details: scalp  Laceration length: 2 cm  Foreign bodies: no foreign bodies  Tendon involvement: none  Nerve involvement: none  Vascular damage: no  Anesthesia: local infiltration    Anesthesia:  Local Anesthetic: lidocaine 1% with epinephrine  Anesthetic total: 3 mL    Sedation:  Patient sedated: no    Irrigation solution: saline  Irrigation method: syringe  Amount of cleaning: standard  Debridement: none  Degree of undermining: none  Skin closure: staples  Number of sutures: 3  Technique: simple  Approximation: close  Approximation difficulty: simple  Dressing: antibiotic ointment  Patient tolerance: Patient tolerated the procedure well with no immediate complications

## 2019-12-03 NOTE — PATIENT INSTRUCTIONS
Laceration Care, Adult  A laceration is a cut that goes through all layers of the skin. The cut also goes into the tissue that is right under the skin. Some cuts heal on their own. Others need to be closed with stitches (sutures), staples, skin adhesive strips, or wound glue. Taking care of your cut lowers your risk of infection and helps your cut to heal better.  HOW TO TAKE CARE OF YOUR CUT  For stitches or staples:  · Keep the wound clean and dry.  · If you were given a bandage (dressing), you should change it at least one time per day or as told by your doctor. You should also change it if it gets wet or dirty.  · Keep the wound completely dry for the first 24 hours or as told by your doctor. After that time, you may take a shower or a bath. However, make sure that the wound is not soaked in water until after the stitches or staples have been removed.  · Clean the wound one time each day or as told by your doctor:  ¨ Wash the wound with soap and water.  ¨ Rinse the wound with water until all of the soap comes off.  ¨ Pat the wound dry with a clean towel. Do not rub the wound.  · After you clean the wound, put a thin layer of antibiotic ointment on it as told by your doctor. This ointment:  ¨ Helps to prevent infection.  ¨ Keeps the bandage from sticking to the wound.  · Have your stitches or staples removed as told by your doctor.  If your doctor used skin adhesive strips:   · Keep the wound clean and dry.  · If you were given a bandage, you should change it at least one time per day or as told by your doctor. You should also change it if it gets dirty or wet.  · Do not get the skin adhesive strips wet. You can take a shower or a bath, but be careful to keep the wound dry.  · If the wound gets wet, pat it dry with a clean towel. Do not rub the wound.  · Skin adhesive strips fall off on their own. You can trim the strips as the wound heals. Do not remove any strips that are still stuck to the wound. They will  fall off after a while.  If your doctor used wound glue:  · Try to keep your wound dry, but you may briefly wet it in the shower or bath. Do not soak the wound in water, such as by swimming.  · After you take a shower or a bath, gently pat the wound dry with a clean towel. Do not rub the wound.  · Do not do any activities that will make you really sweaty until the skin glue has fallen off on its own.  · Do not apply liquid, cream, or ointment medicine to your wound while the skin glue is still on.  · If you were given a bandage, you should change it at least one time per day or as told by your doctor. You should also change it if it gets dirty or wet.  · If a bandage is placed over the wound, do not let the tape for the bandage touch the skin glue.  · Do not pick at the glue. The skin glue usually stays on for 5-10 days. Then, it falls off of the skin.  General Instructions   · To help prevent scarring, make sure to cover your wound with sunscreen whenever you are outside after stitches are removed, after adhesive strips are removed, or when wound glue stays in place and the wound is healed. Make sure to wear a sunscreen of at least 30 SPF.  · Take over-the-counter and prescription medicines only as told by your doctor.  · If you were given antibiotic medicine or ointment, take or apply it as told by your doctor. Do not stop using the antibiotic even if your wound is getting better.  · Do not scratch or pick at the wound.  · Keep all follow-up visits as told by your doctor. This is important.  · Check your wound every day for signs of infection. Watch for:  ¨ Redness, swelling, or pain.  ¨ Fluid, blood, or pus.  · Raise (elevate) the injured area above the level of your heart while you are sitting or lying down, if possible.  GET HELP IF:  · You got a tetanus shot and you have any of these problems at the injection site:  ¨ Swelling.  ¨ Very bad pain.  ¨ Redness.  ¨ Bleeding.  · You have a fever.  · A wound that was  closed breaks open.  · You notice a bad smell coming from your wound or your bandage.  · You notice something coming out of the wound, such as wood or glass.  · Medicine does not help your pain.  · You have more redness, swelling, or pain at the site of your wound.  · You have fluid, blood, or pus coming from your wound.  · You notice a change in the color of your skin near your wound.  · You need to change the bandage often because fluid, blood, or pus is coming from the wound.  · You start to have a new rash.  · You start to have numbness around the wound.  GET HELP RIGHT AWAY IF:  · You have very bad swelling around the wound.  · Your pain suddenly gets worse and is very bad.  · You notice painful lumps near the wound or on skin that is anywhere on your body.  · You have a red streak going away from your wound.  · The wound is on your hand or foot and you cannot move a finger or toe like you usually can.  · The wound is on your hand or foot and you notice that your fingers or toes look pale or bluish.     This information is not intended to replace advice given to you by your health care provider. Make sure you discuss any questions you have with your health care provider.     Document Released: 06/05/2009 Document Revised: 05/03/2016 Document Reviewed: 12/14/2015  [employer] Interactive Patient Education ©2016 [employer]

## 2019-12-03 NOTE — PROGRESS NOTES
Subjective:      Pt is a 74 y.o. female who presents with Head Injury (Head injury x1 days)            HPI  This is a new problem. Pt notes fell out of bed this morning and hit head on nightstand causing a laceration to her scalp with copious bleeding x 2-3 hours. Pt has not taken any Rx medications for this condition. Pt states the pain is a 3/10, aching in nature and worse at the time of injury. Pt denies CP, SOB, NVD, paresthesias, headaches, dizziness, change in vision, hives, or other joint pain. The pt's medication list, problem list, and allergies have been evaluated and reviewed during today's visit.    PMH:  Past Medical History:   Diagnosis Date   • High cholesterol    • Hypertension    • Small intestinal hemorrhage not requiring more than four units of blood in 24 hours, admission to ICU, or surgery 2018   • Stomach ulcer        PSH:  Past Surgical History:   Procedure Laterality Date   • ANKLE ORIF Right 1/22/2019    Procedure: ANKLE ORIF;  Surgeon: Rajan Griffin M.D.;  [place] [facility] [place]  Service: Orthopedics   • COLONOSCOPY - ENDO N/A 6/18/2018    Procedure: COLONOSCOPY - ENDO;  Surgeon: Carlos Rudolph M.D.;  Location: ENDOSCOPY [facility];  Service: Gastroenterology   • GASTROSCOPY-ENDO  6/16/2018    Procedure: GASTROSCOPY-ENDO;  Surgeon: Braden Monge M.D.;  Location: ENDOSCOPY [facility];  Service: Gastroenterology   • IRRIGATION & DEBRIDEMENT ORTHO Right 7/23/2015    Procedure: IRRIGATION & DEBRIDEMENT ORTHO KNEE ;  Surgeon: Sameer Kwan M.D.;  [place] [facility];  Service:    • PATELLA ORIF  7/23/2015    Procedure: PATELLA ORIF REVISION;  Surgeon: Sameer Kwan M.D.;  [place] [facility] [place];  Service:    • IRRIGATION & DEBRIDEMENT ORTHO Right 7/21/2015    Procedure: IRRIGATION & DEBRIDEMENT ORTHO;  Surgeon: Sameer Kwan M.D.;  [place] [facility] [place];  Service:    • HARDWARE REMOVAL ORTHO  7/21/2015    Procedure:  HARDWARE REMOVAL PATELLA;  Surgeon: Sameer Kwan M.D.;  Location: SURGERY [facility];  Service:    • SEPTOPLASTY  10/7/2010    [facility]   • SEPTOPLASTY  4/26/2010    Performed by TRISTON SYLVESTER at [facility]   • TURBINATE REDUCTION  4/26/2010    Performed by TRISTON SYLVESTER at [facility]   • HYSTERECTOMY LAPAROSCOPY  1994   • PB REDUCTION OF LARGE BREAST         Fam Hx:    family history includes Cancer in an other family member; Hypertension in an other family member.  Family Status   Relation Name Status   • OTHER  (Not Specified)   • OTHER  (Not Specified)       Soc HX:  Social History     Socioeconomic History   • Marital status:      Spouse name: Not on file   • Number of children: Not on file   • Years of education: Not on file   • Highest education level: Not on file   Occupational History   • Not on file   Social Needs   • Financial resource strain: Not on file   • Food insecurity:     Worry: Not on file     Inability: Not on file   • Transportation needs:     Medical: Not on file     Non-medical: Not on file   Tobacco Use   • Smoking status: Never Smoker   • Smokeless tobacco: Never Used   Substance and Sexual Activity   • Alcohol use: Yes     Comment: 3-4 per week   • Drug use: No   • Sexual activity: Not on file   Lifestyle   • Physical activity:     Days per week: Not on file     Minutes per session: Not on file   • Stress: Not on file   Relationships   • Social connections:     Talks on phone: Not on file     Gets together: Not on file     Attends Jain service: Not on file     Active member of club or organization: Not on file     Attends meetings of clubs or organizations: Not on file     Relationship status: Not on file   • Intimate partner violence:     Fear of current or ex partner: Not on file     Emotionally abused: Not on file     Physically abused: Not on file     Forced  "sexual activity: Not on file   Other Topics Concern   • Not on file   Social History Narrative   • Not on file         Medications:    Current Outpatient Medications:   •  lisinopril (PRINIVIL) 20 MG Tab, Take 20 mg by mouth every day., Disp: , Rfl:   •  omeprazole (PRILOSEC) 20 MG delayed-release capsule, Take 20 mg by mouth every evening., Disp: , Rfl:   •  rosuvastatin (CRESTOR) 40 MG tablet, Take 40 mg by mouth every evening., Disp: , Rfl:   •  denosumab (PROLIA) 60 MG/ML Solution, Inject 60 mg as instructed Once. Every 6 months, Disp: , Rfl:   •  FLUoxetine (PROZAC) 20 MG Cap, Take 20 mg by mouth every day., Disp: , Rfl:       Allergies:  Nkda [no known drug allergy]    ROS    Constitutional: Negative for fever, chills and malaise/fatigue.   HENT: Negative for congestion and sore throat.  +scalp laceration  Eyes: Negative for blurred vision, double vision and photophobia.   Respiratory: Negative for cough and shortness of breath.  Cardiovascular: Negative for chest pain and palpitations.   Gastrointestinal: Negative for heartburn, nausea, vomiting, abdominal pain, diarrhea and constipation.   Genitourinary: Negative for dysuria and flank pain.   Musculoskeletal: Negative for joint pain and myalgias.   Skin: Negative for itching and rash.   Neurological: Negative for dizziness, tingling and headaches.   Endo/Heme/Allergies: Does not bruise/bleed easily.   Psychiatric/Behavioral: Negative for depression. The patient is not nervous/anxious.         Objective:     /88 (BP Location: Left arm, Patient Position: Sitting, BP Cuff Size: Adult)   Pulse 68   Temp 36.9 °C (98.4 °F) (Temporal)   Resp 16   Ht 1.727 m (5' 8\")   Wt 68.4 kg (150 lb 12.8 oz)   SpO2 97%   BMI 22.93 kg/m²      Physical Exam  HENT:      Head: Normocephalic. Laceration present. No raccoon eyes, Coreas's sign, abrasion, contusion, masses, right periorbital erythema or left periorbital erythema. Hair is normal.      Jaw: There is normal " jaw occlusion.      Salivary Glands: Right salivary gland is not diffusely enlarged or tender. Left salivary gland is not diffusely enlarged or tender.             Constitutional: PT is oriented to person, place, and time. PT appears well-developed and well-nourished. No distress.   HENT:   Mouth/Throat: Oropharynx is clear and moist. No oropharyngeal exudate.   Eyes: Conjunctivae normal and EOM are normal. Pupils are equal, round, and reactive to light.   Neck: Normal range of motion. Neck supple. No thyromegaly present.   Cardiovascular: Normal rate, regular rhythm, normal heart sounds and intact distal pulses.  Exam reveals no gallop and no friction rub.    No murmur heard.  Pulmonary/Chest: Effort normal and breath sounds normal. No respiratory distress. PT has no wheezes. PT has no rales. Pt exhibits no tenderness.   Abdominal: Soft. Bowel sounds are normal. PT exhibits no distension and no mass. There is no tenderness. There is no rebound and no guarding.   Musculoskeletal: Normal range of motion. PT exhibits no edema and no tenderness.   Neurological: PT is alert and oriented to person, place, and time. PT has normal reflexes. No cranial nerve deficit.   Skin: Skin is warm and dry. No rash noted. PT is not diaphoretic. No erythema. SEE HEAD      Psychiatric: PT has a normal mood and affect. PT behavior is normal. Judgment and thought content normal.          Assessment/Plan:       1. Scalp laceration, initial encounter    -Laceration repair  Rest, fluids encouraged.  OTC Tylenol for pain  AVS with medical info given.  Pt was in full understanding and agreement with the plan.  Differential diagnosis, natural history, supportive care, and indications for immediate follow-up discussed. All questions answered. Patient agrees with the plan of care.  Follow-up 5 days for staple removal or as needed if symptoms worsen or fail to improve to PCP, Urgent care or Emergency Room.

## 2019-12-06 ENCOUNTER — OFFICE VISIT (OUTPATIENT)
Dept: URGENT CARE | Facility: MEDICAL CENTER | Age: 74
End: 2019-12-06
Payer: MEDICARE

## 2019-12-06 VITALS
TEMPERATURE: 98 F | BODY MASS INDEX: 22.35 KG/M2 | OXYGEN SATURATION: 96 % | DIASTOLIC BLOOD PRESSURE: 82 MMHG | RESPIRATION RATE: 20 BRPM | WEIGHT: 147 LBS | HEART RATE: 60 BPM | SYSTOLIC BLOOD PRESSURE: 138 MMHG

## 2019-12-06 DIAGNOSIS — Z51.89 VISIT FOR WOUND CHECK: ICD-10-CM

## 2019-12-06 PROCEDURE — 99024 POSTOP FOLLOW-UP VISIT: CPT | Performed by: PHYSICIAN ASSISTANT

## 2019-12-09 ENCOUNTER — OFFICE VISIT (OUTPATIENT)
Dept: URGENT CARE | Facility: MEDICAL CENTER | Age: 74
End: 2019-12-09
Payer: MEDICARE

## 2019-12-09 VITALS
DIASTOLIC BLOOD PRESSURE: 70 MMHG | SYSTOLIC BLOOD PRESSURE: 128 MMHG | HEART RATE: 58 BPM | OXYGEN SATURATION: 96 % | TEMPERATURE: 98.5 F | RESPIRATION RATE: 18 BRPM

## 2019-12-09 DIAGNOSIS — Z48.02 REMOVAL OF STAPLE: ICD-10-CM

## 2019-12-09 PROCEDURE — 99024 POSTOP FOLLOW-UP VISIT: CPT | Performed by: FAMILY MEDICINE

## 2019-12-09 NOTE — PROGRESS NOTES
Subjective:      Michelle Moon is a 74 y.o. female who presents with Suture / Staple Removal (x1week, top of head, staples)  She is here for staple removal.  Doing otherwise well.  No other complaints          HPI    ROS       Objective:     /70   Pulse (!) 58   Temp 36.9 °C (98.5 °F) (Temporal)   Resp 18   SpO2 96%      Physical Exam  Constitutional:       General: She is not in acute distress.     Appearance: She is not ill-appearing.   HENT:      Head:        Right Ear: External ear normal.      Left Ear: External ear normal.   Eyes:      Conjunctiva/sclera: Conjunctivae normal.   Cardiovascular:      Rate and Rhythm: Normal rate.   Pulmonary:      Effort: Pulmonary effort is normal.      Breath sounds: No stridor.   Skin:     Coloration: Skin is not pale.   Neurological:      Mental Status: She is alert and oriented to person, place, and time.   Psychiatric:         Mood and Affect: Mood normal.                 Assessment/Plan:   ASSESSMENT:PLAN:  1. Removal of staple    See as mentioned above  Follow up prn

## 2020-02-24 ENCOUNTER — HOSPITAL ENCOUNTER (OUTPATIENT)
Dept: LAB | Facility: MEDICAL CENTER | Age: 75
End: 2020-02-24
Attending: PSYCHIATRY & NEUROLOGY
Payer: MEDICARE

## 2020-02-24 LAB — ERYTHROCYTE [SEDIMENTATION RATE] IN BLOOD BY WESTERGREN METHOD: 4 MM/HOUR (ref 0–30)

## 2020-02-24 PROCEDURE — 86140 C-REACTIVE PROTEIN: CPT

## 2020-02-24 PROCEDURE — 86376 MICROSOMAL ANTIBODY EACH: CPT

## 2020-02-24 PROCEDURE — 36415 COLL VENOUS BLD VENIPUNCTURE: CPT

## 2020-02-24 PROCEDURE — 82607 VITAMIN B-12: CPT

## 2020-02-24 PROCEDURE — 86038 ANTINUCLEAR ANTIBODIES: CPT

## 2020-02-24 PROCEDURE — 86800 THYROGLOBULIN ANTIBODY: CPT

## 2020-02-24 PROCEDURE — 85652 RBC SED RATE AUTOMATED: CPT

## 2020-02-25 LAB
CRP SERPL HS-MCNC: 0.06 MG/DL (ref 0–0.75)
THYROPEROXIDASE AB SERPL-ACNC: <0.2 IU/ML (ref 0–9)
VIT B12 SERPL-MCNC: 682 PG/ML (ref 211–911)

## 2020-02-26 LAB
NUCLEAR IGG SER QL IA: NORMAL
THYROGLOB AB SERPL-ACNC: <0.9 IU/ML (ref 0–4)

## 2020-07-09 ENCOUNTER — APPOINTMENT (RX ONLY)
Dept: URBAN - METROPOLITAN AREA CLINIC 4 | Facility: CLINIC | Age: 75
Setting detail: DERMATOLOGY
End: 2020-07-09

## 2020-07-09 PROBLEM — D48.5 NEOPLASM OF UNCERTAIN BEHAVIOR OF SKIN: Status: ACTIVE | Noted: 2020-07-09

## 2020-07-09 PROCEDURE — 12032 INTMD RPR S/A/T/EXT 2.6-7.5: CPT

## 2020-07-09 PROCEDURE — ? EXCISION

## 2020-07-09 PROCEDURE — 11602 EXC TR-EXT MAL+MARG 1.1-2 CM: CPT

## 2020-07-09 NOTE — PROCEDURE: EXCISION
Biopsy Photograph Reviewed: Yes
Size Of Lesion In Cm: 0.7
X Size Of Lesion In Cm (Optional): 0
Size Of Margin In Cm: 0.4
Excision Method: Elliptical
Anesthesia Volume In Cc: 4.5
Did You Provide Opioid Counseling: No
Repair Type: Intermediate
Suturegard Retention Suture: 2-0 Nylon
Retention Suture Bite Size: 3 mm
Length To Time In Minutes Device Was In Place: 10
Number Of Hemigard Strips Per Side: 1
Intermediate / Complex Repair - Final Wound Length In Cm: 3
Undermining Type: Entire Wound
Debridement Text: The wound edges were debrided prior to proceeding with the closure to facilitate wound healing.
Helical Rim Text: The closure involved the helical rim.
Vermilion Border Text: The closure involved the vermilion border.
Nostril Rim Text: The closure involved the nostril rim.
Retention Suture Text: Retention sutures were placed to support the closure and prevent dehiscence.
Suture Removal: 14 days
Lab: [id number]
Lab Facility: 
Graft Donor Site Bandage (Optional-Leave Blank If You Don't Want In Note): Steri-strips and a pressure bandage were applied to the donor site.
Epidermal Closure Graft Donor Site (Optional): simple interrupted
Billing Type: Third-Party Bill
Excision Depth: adipose tissue
Scalpel Size: 15 blade
Anesthesia Type: 1% lidocaine with epinephrine
Additional Anesthesia Volume In Cc: 9
Hemostasis: Electrocautery
Estimated Blood Loss (Cc): minimal
Detail Level: Detailed
Anesthesia Volume In Cc: 6
Deep Sutures: 3-0 Polysorb
Epidermal Sutures: 4-0 Nylon
Wound Care: Petrolatum
Dressing: pressure dressing
Suturegard Intro: Intraoperative tissue expansion was performed, utilizing the SUTUREGARD device, in order to reduce wound tension.
Suturegard Body: The suture ends were repeatedly re-tightened and re-clamped to achieve the desired tissue expansion.
Hemigard Intro: Due to skin fragility and wound tension, it was decided to use HEMIGARD adhesive retention suture devices to permit a linear closure. The skin was cleaned and dried for a 6cm distance away from the wound. Excessive hair, if present, was removed to allow for adhesion.
Hemigard Postcare Instructions: The HEMIGARD strips are to remain completely dry for at least 5-7 days.
Positioning (Leave Blank If You Do Not Want): The patient was placed in a comfortable position exposing the surgical site.
Pre-Excision Curettage Text (Leave Blank If You Do Not Want): Prior to drawing the surgical margin the visible lesion was removed with electrodesiccation and curettage to clearly define the lesion size.
Complex Repair Preamble Text (Leave Blank If You Do Not Want): Extensive wide undermining was performed.
Intermediate Repair Preamble Text (Leave Blank If You Do Not Want): Undermining was performed with blunt dissection.
Curvilinear Excision Additional Text (Leave Blank If You Do Not Want): The margin was drawn around the clinically apparent lesion.  A curvilinear shape was then drawn on the skin incorporating the lesion and margins.  Incisions were then made along these lines to the appropriate tissue plane and the lesion was extirpated.
Fusiform Excision Additional Text (Leave Blank If You Do Not Want): The margin was drawn around the clinically apparent lesion.  A fusiform shape was then drawn on the skin incorporating the lesion and margins.  Incisions were then made along these lines to the appropriate tissue plane and the lesion was extirpated.
Elliptical Excision Additional Text (Leave Blank If You Do Not Want): The margin was drawn around the clinically apparent lesion.  An elliptical shape was then drawn on the skin incorporating the lesion and margins.  Incisions were then made along these lines to the appropriate tissue plane and the lesion was extirpated.
Saucerization Excision Additional Text (Leave Blank If You Do Not Want): The margin was drawn around the clinically apparent lesion.  Incisions were then made along these lines, in a tangential fashion, to the appropriate tissue plane and the lesion was extirpated.
Slit Excision Additional Text (Leave Blank If You Do Not Want): A linear line was drawn on the skin overlying the lesion. An incision was made slowly until the lesion was visualized.  Once visualized, the lesion was removed with blunt dissection.
Excisional Biopsy Additional Text (Leave Blank If You Do Not Want): The margin was drawn around the clinically apparent lesion. An elliptical shape was then drawn on the skin incorporating the lesion and margins.  Incisions were then made along these lines to the appropriate tissue plane and the lesion was extirpated.
Perilesional Excision Additional Text (Leave Blank If You Do Not Want): The margin was drawn around the clinically apparent lesion. Incisions were then made along these lines to the appropriate tissue plane and the lesion was extirpated.
Repair Performed By Another Provider Text (Leave Blank If You Do Not Want): After the tissue was excised the defect was repaired by another provider.
No Repair - Repaired With Adjacent Surgical Defect Text (Leave Blank If You Do Not Want): After the excision the defect was repaired concurrently with another surgical defect which was in close approximation.
Advancement Flap (Single) Text: The defect edges were debeveled with a #15 scalpel blade.  Given the location of the defect and the proximity to free margins a single advancement flap was deemed most appropriate.  Using a sterile surgical marker, an appropriate advancement flap was drawn incorporating the defect and placing the expected incisions within the relaxed skin tension lines where possible.    The area thus outlined was incised deep to adipose tissue with a #15 scalpel blade.  The skin margins were undermined to an appropriate distance in all directions utilizing iris scissors.
Advancement Flap (Double) Text: The defect edges were debeveled with a #15 scalpel blade.  Given the location of the defect and the proximity to free margins a double advancement flap was deemed most appropriate.  Using a sterile surgical marker, the appropriate advancement flaps were drawn incorporating the defect and placing the expected incisions within the relaxed skin tension lines where possible.    The area thus outlined was incised deep to adipose tissue with a #15 scalpel blade.  The skin margins were undermined to an appropriate distance in all directions utilizing iris scissors.
Burow's Advancement Flap Text: The defect edges were debeveled with a #15 scalpel blade.  Given the location of the defect and the proximity to free margins a Burow's advancement flap was deemed most appropriate.  Using a sterile surgical marker, the appropriate advancement flap was drawn incorporating the defect and placing the expected incisions within the relaxed skin tension lines where possible.    The area thus outlined was incised deep to adipose tissue with a #15 scalpel blade.  The skin margins were undermined to an appropriate distance in all directions utilizing iris scissors.
Chonodrocutaneous Helical Advancement Flap Text: The defect edges were debeveled with a #15 scalpel blade.  Given the location of the defect and the proximity to free margins a chondrocutaneous helical advancement flap was deemed most appropriate.  Using a sterile surgical marker, the appropriate advancement flap was drawn incorporating the defect and placing the expected incisions within the relaxed skin tension lines where possible.    The area thus outlined was incised deep to adipose tissue with a #15 scalpel blade.  The skin margins were undermined to an appropriate distance in all directions utilizing iris scissors.
Crescentic Advancement Flap Text: The defect edges were debeveled with a #15 scalpel blade.  Given the location of the defect and the proximity to free margins a crescentic advancement flap was deemed most appropriate.  Using a sterile surgical marker, the appropriate advancement flap was drawn incorporating the defect and placing the expected incisions within the relaxed skin tension lines where possible.    The area thus outlined was incised deep to adipose tissue with a #15 scalpel blade.  The skin margins were undermined to an appropriate distance in all directions utilizing iris scissors.
A-T Advancement Flap Text: The defect edges were debeveled with a #15 scalpel blade.  Given the location of the defect, shape of the defect and the proximity to free margins an A-T advancement flap was deemed most appropriate.  Using a sterile surgical marker, an appropriate advancement flap was drawn incorporating the defect and placing the expected incisions within the relaxed skin tension lines where possible.    The area thus outlined was incised deep to adipose tissue with a #15 scalpel blade.  The skin margins were undermined to an appropriate distance in all directions utilizing iris scissors.
O-T Advancement Flap Text: The defect edges were debeveled with a #15 scalpel blade.  Given the location of the defect, shape of the defect and the proximity to free margins an O-T advancement flap was deemed most appropriate.  Using a sterile surgical marker, an appropriate advancement flap was drawn incorporating the defect and placing the expected incisions within the relaxed skin tension lines where possible.    The area thus outlined was incised deep to adipose tissue with a #15 scalpel blade.  The skin margins were undermined to an appropriate distance in all directions utilizing iris scissors.
O-L Flap Text: The defect edges were debeveled with a #15 scalpel blade.  Given the location of the defect, shape of the defect and the proximity to free margins an O-L flap was deemed most appropriate.  Using a sterile surgical marker, an appropriate advancement flap was drawn incorporating the defect and placing the expected incisions within the relaxed skin tension lines where possible.    The area thus outlined was incised deep to adipose tissue with a #15 scalpel blade.  The skin margins were undermined to an appropriate distance in all directions utilizing iris scissors.
O-Z Flap Text: The defect edges were debeveled with a #15 scalpel blade.  Given the location of the defect, shape of the defect and the proximity to free margins an O-Z flap was deemed most appropriate.  Using a sterile surgical marker, an appropriate transposition flap was drawn incorporating the defect and placing the expected incisions within the relaxed skin tension lines where possible. The area thus outlined was incised deep to adipose tissue with a #15 scalpel blade.  The skin margins were undermined to an appropriate distance in all directions utilizing iris scissors.
Double O-Z Flap Text: The defect edges were debeveled with a #15 scalpel blade.  Given the location of the defect, shape of the defect and the proximity to free margins a Double O-Z flap was deemed most appropriate.  Using a sterile surgical marker, an appropriate transposition flap was drawn incorporating the defect and placing the expected incisions within the relaxed skin tension lines where possible. The area thus outlined was incised deep to adipose tissue with a #15 scalpel blade.  The skin margins were undermined to an appropriate distance in all directions utilizing iris scissors.
V-Y Flap Text: The defect edges were debeveled with a #15 scalpel blade.  Given the location of the defect, shape of the defect and the proximity to free margins a V-Y flap was deemed most appropriate.  Using a sterile surgical marker, an appropriate advancement flap was drawn incorporating the defect and placing the expected incisions within the relaxed skin tension lines where possible.    The area thus outlined was incised deep to adipose tissue with a #15 scalpel blade.  The skin margins were undermined to an appropriate distance in all directions utilizing iris scissors.
Advancement-Rotation Flap Text: The defect edges were debeveled with a #15 scalpel blade.  Given the location of the defect, shape of the defect and the proximity to free margins an advancement-rotation flap was deemed most appropriate.  Using a sterile surgical marker, an appropriate flap was drawn incorporating the defect and placing the expected incisions within the relaxed skin tension lines where possible. The area thus outlined was incised deep to adipose tissue with a #15 scalpel blade.  The skin margins were undermined to an appropriate distance in all directions utilizing iris scissors.
Mercedes Flap Text: The defect edges were debeveled with a #15 scalpel blade.  Given the location of the defect, shape of the defect and the proximity to free margins a Mercedes flap was deemed most appropriate.  Using a sterile surgical marker, an appropriate advancement flap was drawn incorporating the defect and placing the expected incisions within the relaxed skin tension lines where possible. The area thus outlined was incised deep to adipose tissue with a #15 scalpel blade.  The skin margins were undermined to an appropriate distance in all directions utilizing iris scissors.
Modified Advancement Flap Text: The defect edges were debeveled with a #15 scalpel blade.  Given the location of the defect, shape of the defect and the proximity to free margins a modified advancement flap was deemed most appropriate.  Using a sterile surgical marker, an appropriate advancement flap was drawn incorporating the defect and placing the expected incisions within the relaxed skin tension lines where possible.    The area thus outlined was incised deep to adipose tissue with a #15 scalpel blade.  The skin margins were undermined to an appropriate distance in all directions utilizing iris scissors.
Mucosal Advancement Flap Text: Given the location of the defect, shape of the defect and the proximity to free margins a mucosal advancement flap was deemed most appropriate. Incisions were made with a 15 blade scalpel in the appropriate fashion along the cutaneous vermilion border and the mucosal lip. The remaining actinically damaged mucosal tissue was excised.  The mucosal advancement flap was then elevated to the gingival sulcus with care taken to preserve the neurovascular structures and advanced into the primary defect. Care was taken to ensure that precise realignment of the vermilion border was achieved.
Peng Advancement Flap Text: The defect edges were debeveled with a #15 scalpel blade.  Given the location of the defect, shape of the defect and the proximity to free margins a Peng advancement flap was deemed most appropriate.  Using a sterile surgical marker, an appropriate advancement flap was drawn incorporating the defect and placing the expected incisions within the relaxed skin tension lines where possible. The area thus outlined was incised deep to adipose tissue with a #15 scalpel blade.  The skin margins were undermined to an appropriate distance in all directions utilizing iris scissors.
Hatchet Flap Text: The defect edges were debeveled with a #15 scalpel blade.  Given the location of the defect, shape of the defect and the proximity to free margins a hatchet flap was deemed most appropriate.  Using a sterile surgical marker, an appropriate hatchet flap was drawn incorporating the defect and placing the expected incisions within the relaxed skin tension lines where possible.    The area thus outlined was incised deep to adipose tissue with a #15 scalpel blade.  The skin margins were undermined to an appropriate distance in all directions utilizing iris scissors.
Rotation Flap Text: The defect edges were debeveled with a #15 scalpel blade.  Given the location of the defect, shape of the defect and the proximity to free margins a rotation flap was deemed most appropriate.  Using a sterile surgical marker, an appropriate rotation flap was drawn incorporating the defect and placing the expected incisions within the relaxed skin tension lines where possible.    The area thus outlined was incised deep to adipose tissue with a #15 scalpel blade.  The skin margins were undermined to an appropriate distance in all directions utilizing iris scissors.
Spiral Flap Text: The defect edges were debeveled with a #15 scalpel blade.  Given the location of the defect, shape of the defect and the proximity to free margins a spiral flap was deemed most appropriate.  Using a sterile surgical marker, an appropriate rotation flap was drawn incorporating the defect and placing the expected incisions within the relaxed skin tension lines where possible. The area thus outlined was incised deep to adipose tissue with a #15 scalpel blade.  The skin margins were undermined to an appropriate distance in all directions utilizing iris scissors.
Star Wedge Flap Text: The defect edges were debeveled with a #15 scalpel blade.  Given the location of the defect, shape of the defect and the proximity to free margins a star wedge flap was deemed most appropriate.  Using a sterile surgical marker, an appropriate rotation flap was drawn incorporating the defect and placing the expected incisions within the relaxed skin tension lines where possible. The area thus outlined was incised deep to adipose tissue with a #15 scalpel blade.  The skin margins were undermined to an appropriate distance in all directions utilizing iris scissors.
Transposition Flap Text: The defect edges were debeveled with a #15 scalpel blade.  Given the location of the defect and the proximity to free margins a transposition flap was deemed most appropriate.  Using a sterile surgical marker, an appropriate transposition flap was drawn incorporating the defect.    The area thus outlined was incised deep to adipose tissue with a #15 scalpel blade.  The skin margins were undermined to an appropriate distance in all directions utilizing iris scissors.
Muscle Hinge Flap Text: The defect edges were debeveled with a #15 scalpel blade.  Given the size, depth and location of the defect and the proximity to free margins a muscle hinge flap was deemed most appropriate.  Using a sterile surgical marker, an appropriate hinge flap was drawn incorporating the defect. The area thus outlined was incised with a #15 scalpel blade.  The skin margins were undermined to an appropriate distance in all directions utilizing iris scissors.
Melolabial Transposition Flap Text: The defect edges were debeveled with a #15 scalpel blade.  Given the location of the defect and the proximity to free margins a melolabial flap was deemed most appropriate.  Using a sterile surgical marker, an appropriate melolabial transposition flap was drawn incorporating the defect.    The area thus outlined was incised deep to adipose tissue with a #15 scalpel blade.  The skin margins were undermined to an appropriate distance in all directions utilizing iris scissors.
Rhombic Flap Text: The defect edges were debeveled with a #15 scalpel blade.  Given the location of the defect and the proximity to free margins a rhombic flap was deemed most appropriate.  Using a sterile surgical marker, an appropriate rhombic flap was drawn incorporating the defect.    The area thus outlined was incised deep to adipose tissue with a #15 scalpel blade.  The skin margins were undermined to an appropriate distance in all directions utilizing iris scissors.
Rhomboid Transposition Flap Text: The defect edges were debeveled with a #15 scalpel blade.  Given the location of the defect and the proximity to free margins a rhomboid transposition flap was deemed most appropriate.  Using a sterile surgical marker, an appropriate rhomboid flap was drawn incorporating the defect.    The area thus outlined was incised deep to adipose tissue with a #15 scalpel blade.  The skin margins were undermined to an appropriate distance in all directions utilizing iris scissors.
Bi-Rhombic Flap Text: The defect edges were debeveled with a #15 scalpel blade.  Given the location of the defect and the proximity to free margins a bi-rhombic flap was deemed most appropriate.  Using a sterile surgical marker, an appropriate rhombic flap was drawn incorporating the defect. The area thus outlined was incised deep to adipose tissue with a #15 scalpel blade.  The skin margins were undermined to an appropriate distance in all directions utilizing iris scissors.
Helical Rim Advancement Flap Text: The defect edges were debeveled with a #15 blade scalpel.  Given the location of the defect and the proximity to free margins (helical rim) a double helical rim advancement flap was deemed most appropriate.  Using a sterile surgical marker, the appropriate advancement flaps were drawn incorporating the defect and placing the expected incisions between the helical rim and antihelix where possible.  The area thus outlined was incised through and through with a #15 scalpel blade.  With a skin hook and iris scissors, the flaps were gently and sharply undermined and freed up.
Bilateral Helical Rim Advancement Flap Text: The defect edges were debeveled with a #15 blade scalpel.  Given the location of the defect and the proximity to free margins (helical rim) a bilateral helical rim advancement flap was deemed most appropriate.  Using a sterile surgical marker, the appropriate advancement flaps were drawn incorporating the defect and placing the expected incisions between the helical rim and antihelix where possible.  The area thus outlined was incised through and through with a #15 scalpel blade.  With a skin hook and iris scissors, the flaps were gently and sharply undermined and freed up.
Ear Star Wedge Flap Text: The defect edges were debeveled with a #15 blade scalpel.  Given the location of the defect and the proximity to free margins (helical rim) an ear star wedge flap was deemed most appropriate.  Using a sterile surgical marker, the appropriate flap was drawn incorporating the defect and placing the expected incisions between the helical rim and antihelix where possible.  The area thus outlined was incised through and through with a #15 scalpel blade.
Banner Transposition Flap Text: The defect edges were debeveled with a #15 scalpel blade.  Given the location of the defect and the proximity to free margins a Banner transposition flap was deemed most appropriate.  Using a sterile surgical marker, an appropriate flap drawn around the defect. The area thus outlined was incised deep to adipose tissue with a #15 scalpel blade.  The skin margins were undermined to an appropriate distance in all directions utilizing iris scissors.
Bilobed Flap Text: The defect edges were debeveled with a #15 scalpel blade.  Given the location of the defect and the proximity to free margins a bilobe flap was deemed most appropriate.  Using a sterile surgical marker, an appropriate bilobe flap drawn around the defect.    The area thus outlined was incised deep to adipose tissue with a #15 scalpel blade.  The skin margins were undermined to an appropriate distance in all directions utilizing iris scissors.
Bilobed Transposition Flap Text: The defect edges were debeveled with a #15 scalpel blade.  Given the location of the defect and the proximity to free margins a bilobed transposition flap was deemed most appropriate.  Using a sterile surgical marker, an appropriate bilobe flap drawn around the defect.    The area thus outlined was incised deep to adipose tissue with a #15 scalpel blade.  The skin margins were undermined to an appropriate distance in all directions utilizing iris scissors.
Trilobed Flap Text: The defect edges were debeveled with a #15 scalpel blade.  Given the location of the defect and the proximity to free margins a trilobed flap was deemed most appropriate.  Using a sterile surgical marker, an appropriate trilobed flap drawn around the defect.    The area thus outlined was incised deep to adipose tissue with a #15 scalpel blade.  The skin margins were undermined to an appropriate distance in all directions utilizing iris scissors.
Dorsal Nasal Flap Text: The defect edges were debeveled with a #15 scalpel blade.  Given the location of the defect and the proximity to free margins a dorsal nasal flap was deemed most appropriate.  Using a sterile surgical marker, an appropriate dorsal nasal flap was drawn around the defect.    The area thus outlined was incised deep to adipose tissue with a #15 scalpel blade.  The skin margins were undermined to an appropriate distance in all directions utilizing iris scissors.
Island Pedicle Flap Text: The defect edges were debeveled with a #15 scalpel blade.  Given the location of the defect, shape of the defect and the proximity to free margins an island pedicle advancement flap was deemed most appropriate.  Using a sterile surgical marker, an appropriate advancement flap was drawn incorporating the defect, outlining the appropriate donor tissue and placing the expected incisions within the relaxed skin tension lines where possible.    The area thus outlined was incised deep to adipose tissue with a #15 scalpel blade.  The skin margins were undermined to an appropriate distance in all directions around the primary defect and laterally outward around the island pedicle utilizing iris scissors.  There was minimal undermining beneath the pedicle flap.
Island Pedicle Flap With Canthal Suspension Text: The defect edges were debeveled with a #15 scalpel blade.  Given the location of the defect, shape of the defect and the proximity to free margins an island pedicle advancement flap was deemed most appropriate.  Using a sterile surgical marker, an appropriate advancement flap was drawn incorporating the defect, outlining the appropriate donor tissue and placing the expected incisions within the relaxed skin tension lines where possible. The area thus outlined was incised deep to adipose tissue with a #15 scalpel blade.  The skin margins were undermined to an appropriate distance in all directions around the primary defect and laterally outward around the island pedicle utilizing iris scissors.  There was minimal undermining beneath the pedicle flap. A suspension suture was placed in the canthal tendon to prevent tension and prevent ectropion.
Alar Island Pedicle Flap Text: The defect edges were debeveled with a #15 scalpel blade.  Given the location of the defect, shape of the defect and the proximity to the alar rim an island pedicle advancement flap was deemed most appropriate.  Using a sterile surgical marker, an appropriate advancement flap was drawn incorporating the defect, outlining the appropriate donor tissue and placing the expected incisions within the nasal ala running parallel to the alar rim. The area thus outlined was incised with a #15 scalpel blade.  The skin margins were undermined minimally to an appropriate distance in all directions around the primary defect and laterally outward around the island pedicle utilizing iris scissors.  There was minimal undermining beneath the pedicle flap.
Double Island Pedicle Flap Text: The defect edges were debeveled with a #15 scalpel blade.  Given the location of the defect, shape of the defect and the proximity to free margins a double island pedicle advancement flap was deemed most appropriate.  Using a sterile surgical marker, an appropriate advancement flap was drawn incorporating the defect, outlining the appropriate donor tissue and placing the expected incisions within the relaxed skin tension lines where possible.    The area thus outlined was incised deep to adipose tissue with a #15 scalpel blade.  The skin margins were undermined to an appropriate distance in all directions around the primary defect and laterally outward around the island pedicle utilizing iris scissors.  There was minimal undermining beneath the pedicle flap.
Island Pedicle Flap-Requiring Vessel Identification Text: The defect edges were debeveled with a #15 scalpel blade.  Given the location of the defect, shape of the defect and the proximity to free margins an island pedicle advancement flap was deemed most appropriate.  Using a sterile surgical marker, an appropriate advancement flap was drawn, based on the axial vessel mentioned above, incorporating the defect, outlining the appropriate donor tissue and placing the expected incisions within the relaxed skin tension lines where possible.    The area thus outlined was incised deep to adipose tissue with a #15 scalpel blade.  The skin margins were undermined to an appropriate distance in all directions around the primary defect and laterally outward around the island pedicle utilizing iris scissors.  There was minimal undermining beneath the pedicle flap.
Keystone Flap Text: The defect edges were debeveled with a #15 scalpel blade.  Given the location of the defect, shape of the defect a keystone flap was deemed most appropriate.  Using a sterile surgical marker, an appropriate keystone flap was drawn incorporating the defect, outlining the appropriate donor tissue and placing the expected incisions within the relaxed skin tension lines where possible. The area thus outlined was incised deep to adipose tissue with a #15 scalpel blade.  The skin margins were undermined to an appropriate distance in all directions around the primary defect and laterally outward around the flap utilizing iris scissors.
O-T Plasty Text: The defect edges were debeveled with a #15 scalpel blade.  Given the location of the defect, shape of the defect and the proximity to free margins an O-T plasty was deemed most appropriate.  Using a sterile surgical marker, an appropriate O-T plasty was drawn incorporating the defect and placing the expected incisions within the relaxed skin tension lines where possible.    The area thus outlined was incised deep to adipose tissue with a #15 scalpel blade.  The skin margins were undermined to an appropriate distance in all directions utilizing iris scissors.
O-Z Plasty Text: The defect edges were debeveled with a #15 scalpel blade.  Given the location of the defect, shape of the defect and the proximity to free margins an O-Z plasty (double transposition flap) was deemed most appropriate.  Using a sterile surgical marker, the appropriate transposition flaps were drawn incorporating the defect and placing the expected incisions within the relaxed skin tension lines where possible.    The area thus outlined was incised deep to adipose tissue with a #15 scalpel blade.  The skin margins were undermined to an appropriate distance in all directions utilizing iris scissors.  Hemostasis was achieved with electrocautery.  The flaps were then transposed into place, one clockwise and the other counterclockwise, and anchored with interrupted buried subcutaneous sutures.
Double O-Z Plasty Text: The defect edges were debeveled with a #15 scalpel blade.  Given the location of the defect, shape of the defect and the proximity to free margins a Double O-Z plasty (double transposition flap) was deemed most appropriate.  Using a sterile surgical marker, the appropriate transposition flaps were drawn incorporating the defect and placing the expected incisions within the relaxed skin tension lines where possible. The area thus outlined was incised deep to adipose tissue with a #15 scalpel blade.  The skin margins were undermined to an appropriate distance in all directions utilizing iris scissors.  Hemostasis was achieved with electrocautery.  The flaps were then transposed into place, one clockwise and the other counterclockwise, and anchored with interrupted buried subcutaneous sutures.
V-Y Plasty Text: The defect edges were debeveled with a #15 scalpel blade.  Given the location of the defect, shape of the defect and the proximity to free margins an V-Y advancement flap was deemed most appropriate.  Using a sterile surgical marker, an appropriate advancement flap was drawn incorporating the defect and placing the expected incisions within the relaxed skin tension lines where possible.    The area thus outlined was incised deep to adipose tissue with a #15 scalpel blade.  The skin margins were undermined to an appropriate distance in all directions utilizing iris scissors.
H Plasty Text: Given the location of the defect, shape of the defect and the proximity to free margins a H-plasty was deemed most appropriate for repair.  Using a sterile surgical marker, the appropriate advancement arms of the H-plasty were drawn incorporating the defect and placing the expected incisions within the relaxed skin tension lines where possible. The area thus outlined was incised deep to adipose tissue with a #15 scalpel blade. The skin margins were undermined to an appropriate distance in all directions utilizing iris scissors.  The opposing advancement arms were then advanced into place in opposite direction and anchored with interrupted buried subcutaneous sutures.
W Plasty Text: The lesion was extirpated to the level of the fat with a #15 scalpel blade.  Given the location of the defect, shape of the defect and the proximity to free margins a W-plasty was deemed most appropriate for repair.  Using a sterile surgical marker, the appropriate transposition arms of the W-plasty were drawn incorporating the defect and placing the expected incisions within the relaxed skin tension lines where possible.    The area thus outlined was incised deep to adipose tissue with a #15 scalpel blade.  The skin margins were undermined to an appropriate distance in all directions utilizing iris scissors.  The opposing transposition arms were then transposed into place in opposite direction and anchored with interrupted buried subcutaneous sutures.
Z Plasty Text: The lesion was extirpated to the level of the fat with a #15 scalpel blade.  Given the location of the defect, shape of the defect and the proximity to free margins a Z-plasty was deemed most appropriate for repair.  Using a sterile surgical marker, the appropriate transposition arms of the Z-plasty were drawn incorporating the defect and placing the expected incisions within the relaxed skin tension lines where possible.    The area thus outlined was incised deep to adipose tissue with a #15 scalpel blade.  The skin margins were undermined to an appropriate distance in all directions utilizing iris scissors.  The opposing transposition arms were then transposed into place in opposite direction and anchored with interrupted buried subcutaneous sutures.
Cheek Interpolation Flap Text: A decision was made to reconstruct the defect utilizing an interpolation axial flap and a staged reconstruction.  A telfa template was made of the defect.  This telfa template was then used to outline the Cheek Interpolation flap.  The donor area for the pedicle flap was then injected with anesthesia.  The flap was excised through the skin and subcutaneous tissue down to the layer of the underlying musculature.  The interpolation flap was carefully excised within this deep plane to maintain its blood supply.  The edges of the donor site were undermined.   The donor site was closed in a primary fashion.  The pedicle was then rotated into position and sutured.  Once the tube was sutured into place, adequate blood supply was confirmed with blanching and refill.  The pedicle was then wrapped with xeroform gauze and dressed appropriately with a telfa and gauze bandage to ensure continued blood supply and protect the attached pedicle.
Cheek-To-Nose Interpolation Flap Text: A decision was made to reconstruct the defect utilizing an interpolation axial flap and a staged reconstruction.  A telfa template was made of the defect.  This telfa template was then used to outline the Cheek-To-Nose Interpolation flap.  The donor area for the pedicle flap was then injected with anesthesia.  The flap was excised through the skin and subcutaneous tissue down to the layer of the underlying musculature.  The interpolation flap was carefully excised within this deep plane to maintain its blood supply.  The edges of the donor site were undermined.   The donor site was closed in a primary fashion.  The pedicle was then rotated into position and sutured.  Once the tube was sutured into place, adequate blood supply was confirmed with blanching and refill.  The pedicle was then wrapped with xeroform gauze and dressed appropriately with a telfa and gauze bandage to ensure continued blood supply and protect the attached pedicle.
Interpolation Flap Text: A decision was made to reconstruct the defect utilizing an interpolation axial flap and a staged reconstruction.  A telfa template was made of the defect.  This telfa template was then used to outline the interpolation flap.  The donor area for the pedicle flap was then injected with anesthesia.  The flap was excised through the skin and subcutaneous tissue down to the layer of the underlying musculature.  The interpolation flap was carefully excised within this deep plane to maintain its blood supply.  The edges of the donor site were undermined.   The donor site was closed in a primary fashion.  The pedicle was then rotated into position and sutured.  Once the tube was sutured into place, adequate blood supply was confirmed with blanching and refill.  The pedicle was then wrapped with xeroform gauze and dressed appropriately with a telfa and gauze bandage to ensure continued blood supply and protect the attached pedicle.
Melolabial Interpolation Flap Text: A decision was made to reconstruct the defect utilizing an interpolation axial flap and a staged reconstruction.  A telfa template was made of the defect.  This telfa template was then used to outline the melolabial interpolation flap.  The donor area for the pedicle flap was then injected with anesthesia.  The flap was excised through the skin and subcutaneous tissue down to the layer of the underlying musculature.  The pedicle flap was carefully excised within this deep plane to maintain its blood supply.  The edges of the donor site were undermined.   The donor site was closed in a primary fashion.  The pedicle was then rotated into position and sutured.  Once the tube was sutured into place, adequate blood supply was confirmed with blanching and refill.  The pedicle was then wrapped with xeroform gauze and dressed appropriately with a telfa and gauze bandage to ensure continued blood supply and protect the attached pedicle.
Mastoid Interpolation Flap Text: A decision was made to reconstruct the defect utilizing an interpolation axial flap and a staged reconstruction.  A telfa template was made of the defect.  This telfa template was then used to outline the mastoid interpolation flap.  The donor area for the pedicle flap was then injected with anesthesia.  The flap was excised through the skin and subcutaneous tissue down to the layer of the underlying musculature.  The pedicle flap was carefully excised within this deep plane to maintain its blood supply.  The edges of the donor site were undermined.   The donor site was closed in a primary fashion.  The pedicle was then rotated into position and sutured.  Once the tube was sutured into place, adequate blood supply was confirmed with blanching and refill.  The pedicle was then wrapped with xeroform gauze and dressed appropriately with a telfa and gauze bandage to ensure continued blood supply and protect the attached pedicle.
Posterior Auricular Interpolation Flap Text: A decision was made to reconstruct the defect utilizing an interpolation axial flap and a staged reconstruction.  A telfa template was made of the defect.  This telfa template was then used to outline the posterior auricular interpolation flap.  The donor area for the pedicle flap was then injected with anesthesia.  The flap was excised through the skin and subcutaneous tissue down to the layer of the underlying musculature.  The pedicle flap was carefully excised within this deep plane to maintain its blood supply.  The edges of the donor site were undermined.   The donor site was closed in a primary fashion.  The pedicle was then rotated into position and sutured.  Once the tube was sutured into place, adequate blood supply was confirmed with blanching and refill.  The pedicle was then wrapped with xeroform gauze and dressed appropriately with a telfa and gauze bandage to ensure continued blood supply and protect the attached pedicle.
Paramedian Forehead Flap Text: A decision was made to reconstruct the defect utilizing an interpolation axial flap and a staged reconstruction.  A telfa template was made of the defect.  This telfa template was then used to outline the paramedian forehead pedicle flap.  The donor area for the pedicle flap was then injected with anesthesia.  The flap was excised through the skin and subcutaneous tissue down to the layer of the underlying musculature.  The pedicle flap was carefully excised within this deep plane to maintain its blood supply.  The edges of the donor site were undermined.   The donor site was closed in a primary fashion.  The pedicle was then rotated into position and sutured.  Once the tube was sutured into place, adequate blood supply was confirmed with blanching and refill.  The pedicle was then wrapped with xeroform gauze and dressed appropriately with a telfa and gauze bandage to ensure continued blood supply and protect the attached pedicle.
Lip Wedge Excision Repair Text: Given the location of the defect and the proximity to free margins a full thickness wedge repair was deemed most appropriate.  Using a sterile surgical marker, the appropriate repair was drawn incorporating the defect and placing the expected incisions perpendicular to the vermilion border.  The vermilion border was also meticulously outlined to ensure appropriate reapproximation during the repair.  The area thus outlined was incised through and through with a #15 scalpel blade.  The muscularis and dermis were reaproximated with deep sutures following hemostasis. Care was taken to realign the vermilion border before proceeding with the superficial closure.  Once the vermilion was realigned the superfical and mucosal closure was finished.
Ftsg Text: The defect edges were debeveled with a #15 scalpel blade.  Given the location of the defect, shape of the defect and the proximity to free margins a full thickness skin graft was deemed most appropriate.  Using a sterile surgical marker, the primary defect shape was transferred to the donor site. The area thus outlined was incised deep to adipose tissue with a #15 scalpel blade.  The harvested graft was then trimmed of adipose tissue until only dermis and epidermis was left.  The skin margins of the secondary defect were undermined to an appropriate distance in all directions utilizing iris scissors.  The secondary defect was closed with interrupted buried subcutaneous sutures.  The skin edges were then re-apposed with running  sutures.  The skin graft was then placed in the primary defect and oriented appropriately.
Split-Thickness Skin Graft Text: The defect edges were debeveled with a #15 scalpel blade.  Given the location of the defect, shape of the defect and the proximity to free margins a split thickness skin graft was deemed most appropriate.  Using a sterile surgical marker, the primary defect shape was transferred to the donor site. The split thickness graft was then harvested.  The skin graft was then placed in the primary defect and oriented appropriately.
Burow's Graft Text: The defect edges were debeveled with a #15 scalpel blade.  Given the location of the defect, shape of the defect, the proximity to free margins and the presence of a standing cone deformity a Burow's skin graft was deemed most appropriate. The standing cone was removed and this tissue was then trimmed to the shape of the primary defect. The adipose tissue was also removed until only dermis and epidermis were left.  The skin margins of the secondary defect were undermined to an appropriate distance in all directions utilizing iris scissors.  The secondary defect was closed with interrupted buried subcutaneous sutures.  The skin edges were then re-apposed with running  sutures.  The skin graft was then placed in the primary defect and oriented appropriately.
Cartilage Graft Text: The defect edges were debeveled with a #15 scalpel blade.  Given the location of the defect, shape of the defect, the fact the defect involved a full thickness cartilage defect a cartilage graft was deemed most appropriate.  An appropriate donor site was identified, cleansed, and anesthetized. The cartilage graft was then harvested and transferred to the recipient site, oriented appropriately and then sutured into place.  The secondary defect was then repaired using a primary closure.
Composite Graft Text: The defect edges were debeveled with a #15 scalpel blade.  Given the location of the defect, shape of the defect, the proximity to free margins and the fact the defect was full thickness a composite graft was deemed most appropriate.  The defect was outline and then transferred to the donor site.  A full thickness graft was then excised from the donor site. The graft was then placed in the primary defect, oriented appropriately and then sutured into place.  The secondary defect was then repaired using a primary closure.
Epidermal Autograft Text: The defect edges were debeveled with a #15 scalpel blade.  Given the location of the defect, shape of the defect and the proximity to free margins an epidermal autograft was deemed most appropriate.  Using a sterile surgical marker, the primary defect shape was transferred to the donor site. The epidermal graft was then harvested.  The skin graft was then placed in the primary defect and oriented appropriately.
Dermal Autograft Text: The defect edges were debeveled with a #15 scalpel blade.  Given the location of the defect, shape of the defect and the proximity to free margins a dermal autograft was deemed most appropriate.  Using a sterile surgical marker, the primary defect shape was transferred to the donor site. The area thus outlined was incised deep to adipose tissue with a #15 scalpel blade.  The harvested graft was then trimmed of adipose and epidermal tissue until only dermis was left.  The skin graft was then placed in the primary defect and oriented appropriately.
Skin Substitute Text: The defect edges were debeveled with a #15 scalpel blade.  Given the location of the defect, shape of the defect and the proximity to free margins a skin substitute graft was deemed most appropriate.  The graft material was trimmed to fit the size of the defect. The graft was then placed in the primary defect and oriented appropriately.
Tissue Cultured Epidermal Autograft Text: The defect edges were debeveled with a #15 scalpel blade.  Given the location of the defect, shape of the defect and the proximity to free margins a tissue cultured epidermal autograft was deemed most appropriate.  The graft was then trimmed to fit the size of the defect.  The graft was then placed in the primary defect and oriented appropriately.
Xenograft Text: The defect edges were debeveled with a #15 scalpel blade.  Given the location of the defect, shape of the defect and the proximity to free margins a xenograft was deemed most appropriate.  The graft was then trimmed to fit the size of the defect.  The graft was then placed in the primary defect and oriented appropriately.
Purse String (Intermediate) Text: Given the location of the defect and the characteristics of the surrounding skin a purse string intermediate closure was deemed most appropriate.  Undermining was performed circumfirentially around the surgical defect.  A purse string suture was then placed and tightened.
Purse String (Simple) Text: Given the location of the defect and the characteristics of the surrounding skin a purse string simple closure was deemed most appropriate.  Undermining was performed circumferentially around the surgical defect.  A purse string suture was then placed and tightened.
Partial Purse String (Intermediate) Text: Given the location of the defect and the characteristics of the surrounding skin an intermediate purse string closure was deemed most appropriate.  Undermining was performed circumferentially around the surgical defect.  A purse string suture was then placed and tightened. Wound tension of the circular defect prevented complete closure of the wound.
Partial Purse String (Simple) Text: Given the location of the defect and the characteristics of the surrounding skin a simple purse string closure was deemed most appropriate.  Undermining was performed circumferentially around the surgical defect.  A purse string suture was then placed and tightened. Wound tension of the circular defect prevented complete closure of the wound.
Complex Repair And Single Advancement Flap Text: The defect edges were debeveled with a #15 scalpel blade.  The primary defect was closed partially with a complex linear closure.  Given the location of the remaining defect, shape of the defect and the proximity to free margins a single advancement flap was deemed most appropriate for complete closure of the defect.  Using a sterile surgical marker, an appropriate advancement flap was drawn incorporating the defect and placing the expected incisions within the relaxed skin tension lines where possible.    The area thus outlined was incised deep to adipose tissue with a #15 scalpel blade.  The skin margins were undermined to an appropriate distance in all directions utilizing iris scissors.
Complex Repair And Double Advancement Flap Text: The defect edges were debeveled with a #15 scalpel blade.  The primary defect was closed partially with a complex linear closure.  Given the location of the remaining defect, shape of the defect and the proximity to free margins a double advancement flap was deemed most appropriate for complete closure of the defect.  Using a sterile surgical marker, an appropriate advancement flap was drawn incorporating the defect and placing the expected incisions within the relaxed skin tension lines where possible.    The area thus outlined was incised deep to adipose tissue with a #15 scalpel blade.  The skin margins were undermined to an appropriate distance in all directions utilizing iris scissors.
Complex Repair And Modified Advancement Flap Text: The defect edges were debeveled with a #15 scalpel blade.  The primary defect was closed partially with a complex linear closure.  Given the location of the remaining defect, shape of the defect and the proximity to free margins a modified advancement flap was deemed most appropriate for complete closure of the defect.  Using a sterile surgical marker, an appropriate advancement flap was drawn incorporating the defect and placing the expected incisions within the relaxed skin tension lines where possible.    The area thus outlined was incised deep to adipose tissue with a #15 scalpel blade.  The skin margins were undermined to an appropriate distance in all directions utilizing iris scissors.
Complex Repair And A-T Advancement Flap Text: The defect edges were debeveled with a #15 scalpel blade.  The primary defect was closed partially with a complex linear closure.  Given the location of the remaining defect, shape of the defect and the proximity to free margins an A-T advancement flap was deemed most appropriate for complete closure of the defect.  Using a sterile surgical marker, an appropriate advancement flap was drawn incorporating the defect and placing the expected incisions within the relaxed skin tension lines where possible.    The area thus outlined was incised deep to adipose tissue with a #15 scalpel blade.  The skin margins were undermined to an appropriate distance in all directions utilizing iris scissors.
Complex Repair And O-T Advancement Flap Text: The defect edges were debeveled with a #15 scalpel blade.  The primary defect was closed partially with a complex linear closure.  Given the location of the remaining defect, shape of the defect and the proximity to free margins an O-T advancement flap was deemed most appropriate for complete closure of the defect.  Using a sterile surgical marker, an appropriate advancement flap was drawn incorporating the defect and placing the expected incisions within the relaxed skin tension lines where possible.    The area thus outlined was incised deep to adipose tissue with a #15 scalpel blade.  The skin margins were undermined to an appropriate distance in all directions utilizing iris scissors.
Complex Repair And O-L Flap Text: The defect edges were debeveled with a #15 scalpel blade.  The primary defect was closed partially with a complex linear closure.  Given the location of the remaining defect, shape of the defect and the proximity to free margins an O-L flap was deemed most appropriate for complete closure of the defect.  Using a sterile surgical marker, an appropriate flap was drawn incorporating the defect and placing the expected incisions within the relaxed skin tension lines where possible.    The area thus outlined was incised deep to adipose tissue with a #15 scalpel blade.  The skin margins were undermined to an appropriate distance in all directions utilizing iris scissors.
Complex Repair And Bilobe Flap Text: The defect edges were debeveled with a #15 scalpel blade.  The primary defect was closed partially with a complex linear closure.  Given the location of the remaining defect, shape of the defect and the proximity to free margins a bilobe flap was deemed most appropriate for complete closure of the defect.  Using a sterile surgical marker, an appropriate advancement flap was drawn incorporating the defect and placing the expected incisions within the relaxed skin tension lines where possible.    The area thus outlined was incised deep to adipose tissue with a #15 scalpel blade.  The skin margins were undermined to an appropriate distance in all directions utilizing iris scissors.
Complex Repair And Melolabial Flap Text: The defect edges were debeveled with a #15 scalpel blade.  The primary defect was closed partially with a complex linear closure.  Given the location of the remaining defect, shape of the defect and the proximity to free margins a melolabial flap was deemed most appropriate for complete closure of the defect.  Using a sterile surgical marker, an appropriate advancement flap was drawn incorporating the defect and placing the expected incisions within the relaxed skin tension lines where possible.    The area thus outlined was incised deep to adipose tissue with a #15 scalpel blade.  The skin margins were undermined to an appropriate distance in all directions utilizing iris scissors.
Complex Repair And Rotation Flap Text: The defect edges were debeveled with a #15 scalpel blade.  The primary defect was closed partially with a complex linear closure.  Given the location of the remaining defect, shape of the defect and the proximity to free margins a rotation flap was deemed most appropriate for complete closure of the defect.  Using a sterile surgical marker, an appropriate advancement flap was drawn incorporating the defect and placing the expected incisions within the relaxed skin tension lines where possible.    The area thus outlined was incised deep to adipose tissue with a #15 scalpel blade.  The skin margins were undermined to an appropriate distance in all directions utilizing iris scissors.
Complex Repair And Rhombic Flap Text: The defect edges were debeveled with a #15 scalpel blade.  The primary defect was closed partially with a complex linear closure.  Given the location of the remaining defect, shape of the defect and the proximity to free margins a rhombic flap was deemed most appropriate for complete closure of the defect.  Using a sterile surgical marker, an appropriate advancement flap was drawn incorporating the defect and placing the expected incisions within the relaxed skin tension lines where possible.    The area thus outlined was incised deep to adipose tissue with a #15 scalpel blade.  The skin margins were undermined to an appropriate distance in all directions utilizing iris scissors.
Complex Repair And Transposition Flap Text: The defect edges were debeveled with a #15 scalpel blade.  The primary defect was closed partially with a complex linear closure.  Given the location of the remaining defect, shape of the defect and the proximity to free margins a transposition flap was deemed most appropriate for complete closure of the defect.  Using a sterile surgical marker, an appropriate advancement flap was drawn incorporating the defect and placing the expected incisions within the relaxed skin tension lines where possible.    The area thus outlined was incised deep to adipose tissue with a #15 scalpel blade.  The skin margins were undermined to an appropriate distance in all directions utilizing iris scissors.
Complex Repair And V-Y Plasty Text: The defect edges were debeveled with a #15 scalpel blade.  The primary defect was closed partially with a complex linear closure.  Given the location of the remaining defect, shape of the defect and the proximity to free margins a V-Y plasty was deemed most appropriate for complete closure of the defect.  Using a sterile surgical marker, an appropriate advancement flap was drawn incorporating the defect and placing the expected incisions within the relaxed skin tension lines where possible.    The area thus outlined was incised deep to adipose tissue with a #15 scalpel blade.  The skin margins were undermined to an appropriate distance in all directions utilizing iris scissors.
Complex Repair And M Plasty Text: The defect edges were debeveled with a #15 scalpel blade.  The primary defect was closed partially with a complex linear closure.  Given the location of the remaining defect, shape of the defect and the proximity to free margins an M plasty was deemed most appropriate for complete closure of the defect.  Using a sterile surgical marker, an appropriate advancement flap was drawn incorporating the defect and placing the expected incisions within the relaxed skin tension lines where possible.    The area thus outlined was incised deep to adipose tissue with a #15 scalpel blade.  The skin margins were undermined to an appropriate distance in all directions utilizing iris scissors.
Complex Repair And Double M Plasty Text: The defect edges were debeveled with a #15 scalpel blade.  The primary defect was closed partially with a complex linear closure.  Given the location of the remaining defect, shape of the defect and the proximity to free margins a double M plasty was deemed most appropriate for complete closure of the defect.  Using a sterile surgical marker, an appropriate advancement flap was drawn incorporating the defect and placing the expected incisions within the relaxed skin tension lines where possible.    The area thus outlined was incised deep to adipose tissue with a #15 scalpel blade.  The skin margins were undermined to an appropriate distance in all directions utilizing iris scissors.
Complex Repair And W Plasty Text: The defect edges were debeveled with a #15 scalpel blade.  The primary defect was closed partially with a complex linear closure.  Given the location of the remaining defect, shape of the defect and the proximity to free margins a W plasty was deemed most appropriate for complete closure of the defect.  Using a sterile surgical marker, an appropriate advancement flap was drawn incorporating the defect and placing the expected incisions within the relaxed skin tension lines where possible.    The area thus outlined was incised deep to adipose tissue with a #15 scalpel blade.  The skin margins were undermined to an appropriate distance in all directions utilizing iris scissors.
Complex Repair And Z Plasty Text: The defect edges were debeveled with a #15 scalpel blade.  The primary defect was closed partially with a complex linear closure.  Given the location of the remaining defect, shape of the defect and the proximity to free margins a Z plasty was deemed most appropriate for complete closure of the defect.  Using a sterile surgical marker, an appropriate advancement flap was drawn incorporating the defect and placing the expected incisions within the relaxed skin tension lines where possible.    The area thus outlined was incised deep to adipose tissue with a #15 scalpel blade.  The skin margins were undermined to an appropriate distance in all directions utilizing iris scissors.
Complex Repair And Dorsal Nasal Flap Text: The defect edges were debeveled with a #15 scalpel blade.  The primary defect was closed partially with a complex linear closure.  Given the location of the remaining defect, shape of the defect and the proximity to free margins a dorsal nasal flap was deemed most appropriate for complete closure of the defect.  Using a sterile surgical marker, an appropriate flap was drawn incorporating the defect and placing the expected incisions within the relaxed skin tension lines where possible.    The area thus outlined was incised deep to adipose tissue with a #15 scalpel blade.  The skin margins were undermined to an appropriate distance in all directions utilizing iris scissors.
Complex Repair And Ftsg Text: The defect edges were debeveled with a #15 scalpel blade.  The primary defect was closed partially with a complex linear closure.  Given the location of the defect, shape of the defect and the proximity to free margins a full thickness skin graft was deemed most appropriate to repair the remaining defect.  The graft was trimmed to fit the size of the remaining defect.  The graft was then placed in the primary defect, oriented appropriately, and sutured into place.
Complex Repair And Burow's Graft Text: The defect edges were debeveled with a #15 scalpel blade.  The primary defect was closed partially with a complex linear closure.  Given the location of the defect, shape of the defect, the proximity to free margins and the presence of a standing cone deformity a Burow's graft was deemed most appropriate to repair the remaining defect.  The graft was trimmed to fit the size of the remaining defect.  The graft was then placed in the primary defect, oriented appropriately, and sutured into place.
Complex Repair And Split-Thickness Skin Graft Text: The defect edges were debeveled with a #15 scalpel blade.  The primary defect was closed partially with a complex linear closure.  Given the location of the defect, shape of the defect and the proximity to free margins a split thickness skin graft was deemed most appropriate to repair the remaining defect.  The graft was trimmed to fit the size of the remaining defect.  The graft was then placed in the primary defect, oriented appropriately, and sutured into place.
Complex Repair And Epidermal Autograft Text: The defect edges were debeveled with a #15 scalpel blade.  The primary defect was closed partially with a complex linear closure.  Given the location of the defect, shape of the defect and the proximity to free margins an epidermal autograft was deemed most appropriate to repair the remaining defect.  The graft was trimmed to fit the size of the remaining defect.  The graft was then placed in the primary defect, oriented appropriately, and sutured into place.
Complex Repair And Dermal Autograft Text: The defect edges were debeveled with a #15 scalpel blade.  The primary defect was closed partially with a complex linear closure.  Given the location of the defect, shape of the defect and the proximity to free margins an dermal autograft was deemed most appropriate to repair the remaining defect.  The graft was trimmed to fit the size of the remaining defect.  The graft was then placed in the primary defect, oriented appropriately, and sutured into place.
Complex Repair And Tissue Cultured Epidermal Autograft Text: The defect edges were debeveled with a #15 scalpel blade.  The primary defect was closed partially with a complex linear closure.  Given the location of the defect, shape of the defect and the proximity to free margins an tissue cultured epidermal autograft was deemed most appropriate to repair the remaining defect.  The graft was trimmed to fit the size of the remaining defect.  The graft was then placed in the primary defect, oriented appropriately, and sutured into place.
Complex Repair And Xenograft Text: The defect edges were debeveled with a #15 scalpel blade.  The primary defect was closed partially with a complex linear closure.  Given the location of the defect, shape of the defect and the proximity to free margins a xenograft was deemed most appropriate to repair the remaining defect.  The graft was trimmed to fit the size of the remaining defect.  The graft was then placed in the primary defect, oriented appropriately, and sutured into place.
Complex Repair And Skin Substitute Graft Text: The defect edges were debeveled with a #15 scalpel blade.  The primary defect was closed partially with a complex linear closure.  Given the location of the remaining defect, shape of the defect and the proximity to free margins a skin substitute graft was deemed most appropriate to repair the remaining defect.  The graft was trimmed to fit the size of the remaining defect.  The graft was then placed in the primary defect, oriented appropriately, and sutured into place.
Path Notes (To The Dermatopathologist): Please check margins.
Consent was obtained from the patient. The risks and benefits to therapy were discussed in detail. Specifically, the risks of infection, scarring, bleeding, prolonged wound healing, incomplete removal, allergy to anesthesia, nerve injury and recurrence were addressed. Prior to the procedure, the treatment site was clearly identified and confirmed by the patient.
Post-Care Instructions: I reviewed with the patient in detail post-care instructions. Patient is not to engage in any heavy lifting, exercise, or swimming for the next 14 days. Should the patient develop any fevers, chills, bleeding, severe pain patient will contact the office immediately.
Home Suture Removal Text: Patient was provided a home suture removal kit and will remove their sutures at home.  If they have any questions or difficulties they will call the office.
Where Do You Want The Question To Include Opioid Counseling Located?: Case Summary Tab
Information: Selecting Yes will display possible errors in your note based on the variables you have selected. This validation is only offered as a suggestion for you. PLEASE NOTE THAT THE VALIDATION TEXT WILL BE REMOVED WHEN YOU FINALIZE YOUR NOTE. IF YOU WANT TO FAX A PRELIMINARY NOTE YOU WILL NEED TO TOGGLE THIS TO 'NO' IF YOU DO NOT WANT IT IN YOUR FAXED NOTE.

## 2020-09-15 ENCOUNTER — IMMUNIZATION (OUTPATIENT)
Dept: SOCIAL WORK | Facility: CLINIC | Age: 75
End: 2020-09-15
Payer: MEDICARE

## 2020-09-15 DIAGNOSIS — Z23 NEED FOR VACCINATION: ICD-10-CM

## 2020-09-15 PROCEDURE — G0008 ADMIN INFLUENZA VIRUS VAC: HCPCS | Performed by: REGISTERED NURSE

## 2020-09-15 PROCEDURE — 90662 IIV NO PRSV INCREASED AG IM: CPT | Performed by: REGISTERED NURSE

## 2021-01-15 DIAGNOSIS — Z23 NEED FOR VACCINATION: ICD-10-CM

## 2021-01-23 ENCOUNTER — APPOINTMENT (OUTPATIENT)
Dept: FAMILY PLANNING/WOMEN'S HEALTH CLINIC | Facility: IMMUNIZATION CENTER | Age: 76
End: 2021-01-23
Attending: INTERNAL MEDICINE
Payer: MEDICARE

## 2021-01-23 ENCOUNTER — IMMUNIZATION (OUTPATIENT)
Dept: FAMILY PLANNING/WOMEN'S HEALTH CLINIC | Facility: IMMUNIZATION CENTER | Age: 76
End: 2021-01-23
Payer: MEDICARE

## 2021-01-23 DIAGNOSIS — Z23 NEED FOR VACCINATION: ICD-10-CM

## 2021-01-23 DIAGNOSIS — Z23 ENCOUNTER FOR VACCINATION: Primary | ICD-10-CM

## 2021-01-23 PROCEDURE — 0001A PFIZER SARS-COV-2 VACCINE: CPT

## 2021-01-23 PROCEDURE — 91300 PFIZER SARS-COV-2 VACCINE: CPT

## 2021-02-19 ENCOUNTER — IMMUNIZATION (OUTPATIENT)
Dept: FAMILY PLANNING/WOMEN'S HEALTH CLINIC | Facility: IMMUNIZATION CENTER | Age: 76
End: 2021-02-19
Attending: INTERNAL MEDICINE
Payer: MEDICARE

## 2021-02-19 DIAGNOSIS — Z23 ENCOUNTER FOR VACCINATION: Primary | ICD-10-CM

## 2021-02-19 PROCEDURE — 91300 PFIZER SARS-COV-2 VACCINE: CPT | Performed by: NURSE PRACTITIONER

## 2021-02-19 PROCEDURE — 0002A PFIZER SARS-COV-2 VACCINE: CPT | Performed by: NURSE PRACTITIONER

## 2021-03-02 ENCOUNTER — HOSPITAL ENCOUNTER (OUTPATIENT)
Dept: LAB | Facility: MEDICAL CENTER | Age: 76
End: 2021-03-02
Attending: FAMILY MEDICINE
Payer: MEDICARE

## 2021-03-02 LAB
ALBUMIN SERPL BCP-MCNC: 4.2 G/DL (ref 3.2–4.9)
ALBUMIN/GLOB SERPL: 1.6 G/DL
ALP SERPL-CCNC: 70 U/L (ref 30–99)
ALT SERPL-CCNC: 18 U/L (ref 2–50)
ANION GAP SERPL CALC-SCNC: 10 MMOL/L (ref 7–16)
AST SERPL-CCNC: 21 U/L (ref 12–45)
BASOPHILS # BLD AUTO: 0.3 % (ref 0–1.8)
BASOPHILS # BLD: 0.02 K/UL (ref 0–0.12)
BILIRUB SERPL-MCNC: 0.4 MG/DL (ref 0.1–1.5)
BUN SERPL-MCNC: 8 MG/DL (ref 8–22)
CALCIUM SERPL-MCNC: 9.6 MG/DL (ref 8.5–10.5)
CHLORIDE SERPL-SCNC: 104 MMOL/L (ref 96–112)
CHOLEST SERPL-MCNC: 273 MG/DL (ref 100–199)
CK SERPL-CCNC: 42 U/L (ref 0–154)
CO2 SERPL-SCNC: 27 MMOL/L (ref 20–33)
CREAT SERPL-MCNC: 0.42 MG/DL (ref 0.5–1.4)
EOSINOPHIL # BLD AUTO: 0.05 K/UL (ref 0–0.51)
EOSINOPHIL NFR BLD: 0.9 % (ref 0–6.9)
ERYTHROCYTE [DISTWIDTH] IN BLOOD BY AUTOMATED COUNT: 45.9 FL (ref 35.9–50)
EST. AVERAGE GLUCOSE BLD GHB EST-MCNC: 114 MG/DL
GLOBULIN SER CALC-MCNC: 2.7 G/DL (ref 1.9–3.5)
GLUCOSE SERPL-MCNC: 91 MG/DL (ref 65–99)
HBA1C MFR BLD: 5.6 % (ref 4–5.6)
HCT VFR BLD AUTO: 45.9 % (ref 37–47)
HDLC SERPL-MCNC: 61 MG/DL
HGB BLD-MCNC: 14.8 G/DL (ref 12–16)
IMM GRANULOCYTES # BLD AUTO: 0.02 K/UL (ref 0–0.11)
IMM GRANULOCYTES NFR BLD AUTO: 0.3 % (ref 0–0.9)
LDLC SERPL CALC-MCNC: 187 MG/DL
LYMPHOCYTES # BLD AUTO: 1.52 K/UL (ref 1–4.8)
LYMPHOCYTES NFR BLD: 26.3 % (ref 22–41)
MCH RBC QN AUTO: 30.8 PG (ref 27–33)
MCHC RBC AUTO-ENTMCNC: 32.2 G/DL (ref 33.6–35)
MCV RBC AUTO: 95.6 FL (ref 81.4–97.8)
MONOCYTES # BLD AUTO: 0.47 K/UL (ref 0–0.85)
MONOCYTES NFR BLD AUTO: 8.1 % (ref 0–13.4)
NEUTROPHILS # BLD AUTO: 3.71 K/UL (ref 2–7.15)
NEUTROPHILS NFR BLD: 64.1 % (ref 44–72)
NRBC # BLD AUTO: 0 K/UL
NRBC BLD-RTO: 0 /100 WBC
PLATELET # BLD AUTO: 258 K/UL (ref 164–446)
PMV BLD AUTO: 11.3 FL (ref 9–12.9)
POTASSIUM SERPL-SCNC: 4.1 MMOL/L (ref 3.6–5.5)
PROT SERPL-MCNC: 6.9 G/DL (ref 6–8.2)
RBC # BLD AUTO: 4.8 M/UL (ref 4.2–5.4)
SODIUM SERPL-SCNC: 141 MMOL/L (ref 135–145)
TRIGL SERPL-MCNC: 127 MG/DL (ref 0–149)
TSH SERPL DL<=0.005 MIU/L-ACNC: 0.76 UIU/ML (ref 0.38–5.33)
WBC # BLD AUTO: 5.8 K/UL (ref 4.8–10.8)

## 2021-03-02 PROCEDURE — 80061 LIPID PANEL: CPT

## 2021-03-02 PROCEDURE — 84443 ASSAY THYROID STIM HORMONE: CPT

## 2021-03-02 PROCEDURE — 80053 COMPREHEN METABOLIC PANEL: CPT

## 2021-03-02 PROCEDURE — 85025 COMPLETE CBC W/AUTO DIFF WBC: CPT

## 2021-03-02 PROCEDURE — 83036 HEMOGLOBIN GLYCOSYLATED A1C: CPT | Mod: GA

## 2021-03-02 PROCEDURE — 36415 COLL VENOUS BLD VENIPUNCTURE: CPT

## 2021-03-02 PROCEDURE — 82550 ASSAY OF CK (CPK): CPT

## 2021-03-03 ENCOUNTER — HOSPITAL ENCOUNTER (OUTPATIENT)
Facility: MEDICAL CENTER | Age: 76
End: 2021-03-03
Attending: FAMILY MEDICINE
Payer: MEDICARE

## 2021-03-03 LAB
APPEARANCE UR: CLEAR
BILIRUB UR QL STRIP.AUTO: NEGATIVE
COLOR UR: YELLOW
GLUCOSE UR STRIP.AUTO-MCNC: NEGATIVE MG/DL
KETONES UR STRIP.AUTO-MCNC: NEGATIVE MG/DL
LEUKOCYTE ESTERASE UR QL STRIP.AUTO: NEGATIVE
MICRO URNS: NORMAL
NITRITE UR QL STRIP.AUTO: NEGATIVE
PH UR STRIP.AUTO: 5.5 [PH] (ref 5–8)
PROT UR QL STRIP: NEGATIVE MG/DL
RBC UR QL AUTO: NEGATIVE
SP GR UR STRIP.AUTO: 1.02
UROBILINOGEN UR STRIP.AUTO-MCNC: 0.2 MG/DL

## 2021-03-03 PROCEDURE — 81003 URINALYSIS AUTO W/O SCOPE: CPT

## 2021-10-06 ENCOUNTER — HOSPITAL ENCOUNTER (OUTPATIENT)
Dept: RADIOLOGY | Facility: MEDICAL CENTER | Age: 76
End: 2021-10-06
Attending: FAMILY MEDICINE
Payer: MEDICARE

## 2021-10-06 DIAGNOSIS — Z12.31 VISIT FOR SCREENING MAMMOGRAM: ICD-10-CM

## 2021-10-06 PROCEDURE — 77063 BREAST TOMOSYNTHESIS BI: CPT

## 2022-04-06 ENCOUNTER — HOSPITAL ENCOUNTER (OUTPATIENT)
Dept: LAB | Facility: MEDICAL CENTER | Age: 77
End: 2022-04-06
Attending: FAMILY MEDICINE
Payer: MEDICARE

## 2022-04-06 LAB
ALBUMIN SERPL BCP-MCNC: 4.3 G/DL (ref 3.2–4.9)
ALBUMIN/GLOB SERPL: 1.7 G/DL
ALP SERPL-CCNC: 123 U/L (ref 30–99)
ALT SERPL-CCNC: 16 U/L (ref 2–50)
ANION GAP SERPL CALC-SCNC: 10 MMOL/L (ref 7–16)
APPEARANCE UR: CLEAR
AST SERPL-CCNC: 17 U/L (ref 12–45)
BACTERIA #/AREA URNS HPF: NEGATIVE /HPF
BASOPHILS # BLD AUTO: 0.4 % (ref 0–1.8)
BASOPHILS # BLD: 0.02 K/UL (ref 0–0.12)
BILIRUB SERPL-MCNC: 0.4 MG/DL (ref 0.1–1.5)
BILIRUB UR QL STRIP.AUTO: NEGATIVE
BUN SERPL-MCNC: 12 MG/DL (ref 8–22)
CALCIUM SERPL-MCNC: 9.5 MG/DL (ref 8.5–10.5)
CHLORIDE SERPL-SCNC: 103 MMOL/L (ref 96–112)
CHOLEST SERPL-MCNC: 222 MG/DL (ref 100–199)
CO2 SERPL-SCNC: 25 MMOL/L (ref 20–33)
COLOR UR: YELLOW
CREAT SERPL-MCNC: 0.49 MG/DL (ref 0.5–1.4)
EOSINOPHIL # BLD AUTO: 0.12 K/UL (ref 0–0.51)
EOSINOPHIL NFR BLD: 2.4 % (ref 0–6.9)
EPI CELLS #/AREA URNS HPF: NEGATIVE /HPF
ERYTHROCYTE [DISTWIDTH] IN BLOOD BY AUTOMATED COUNT: 47.8 FL (ref 35.9–50)
EST. AVERAGE GLUCOSE BLD GHB EST-MCNC: 111 MG/DL
FASTING STATUS PATIENT QL REPORTED: NORMAL
GFR SERPLBLD CREATININE-BSD FMLA CKD-EPI: 98 ML/MIN/1.73 M 2
GLOBULIN SER CALC-MCNC: 2.6 G/DL (ref 1.9–3.5)
GLUCOSE SERPL-MCNC: 95 MG/DL (ref 65–99)
GLUCOSE UR STRIP.AUTO-MCNC: NEGATIVE MG/DL
HBA1C MFR BLD: 5.5 % (ref 4–5.6)
HCT VFR BLD AUTO: 43.6 % (ref 37–47)
HDLC SERPL-MCNC: 58 MG/DL
HGB BLD-MCNC: 14.2 G/DL (ref 12–16)
HYALINE CASTS #/AREA URNS LPF: NORMAL /LPF
IMM GRANULOCYTES # BLD AUTO: 0.02 K/UL (ref 0–0.11)
IMM GRANULOCYTES NFR BLD AUTO: 0.4 % (ref 0–0.9)
KETONES UR STRIP.AUTO-MCNC: NEGATIVE MG/DL
LDLC SERPL CALC-MCNC: 124 MG/DL
LEUKOCYTE ESTERASE UR QL STRIP.AUTO: ABNORMAL
LYMPHOCYTES # BLD AUTO: 1.36 K/UL (ref 1–4.8)
LYMPHOCYTES NFR BLD: 27.4 % (ref 22–41)
MCH RBC QN AUTO: 30.5 PG (ref 27–33)
MCHC RBC AUTO-ENTMCNC: 32.6 G/DL (ref 33.6–35)
MCV RBC AUTO: 93.6 FL (ref 81.4–97.8)
MICRO URNS: ABNORMAL
MONOCYTES # BLD AUTO: 0.47 K/UL (ref 0–0.85)
MONOCYTES NFR BLD AUTO: 9.5 % (ref 0–13.4)
NEUTROPHILS # BLD AUTO: 2.98 K/UL (ref 2–7.15)
NEUTROPHILS NFR BLD: 59.9 % (ref 44–72)
NITRITE UR QL STRIP.AUTO: NEGATIVE
NRBC # BLD AUTO: 0 K/UL
NRBC BLD-RTO: 0 /100 WBC
PH UR STRIP.AUTO: 6 [PH] (ref 5–8)
PLATELET # BLD AUTO: 234 K/UL (ref 164–446)
PMV BLD AUTO: 11.1 FL (ref 9–12.9)
POTASSIUM SERPL-SCNC: 4.2 MMOL/L (ref 3.6–5.5)
PROT SERPL-MCNC: 6.9 G/DL (ref 6–8.2)
PROT UR QL STRIP: NEGATIVE MG/DL
RBC # BLD AUTO: 4.66 M/UL (ref 4.2–5.4)
RBC # URNS HPF: NORMAL /HPF
RBC UR QL AUTO: NEGATIVE
SODIUM SERPL-SCNC: 138 MMOL/L (ref 135–145)
SP GR UR STRIP.AUTO: 1.02
TRIGL SERPL-MCNC: 200 MG/DL (ref 0–149)
TSH SERPL DL<=0.005 MIU/L-ACNC: 1.01 UIU/ML (ref 0.38–5.33)
UROBILINOGEN UR STRIP.AUTO-MCNC: 0.2 MG/DL
WBC # BLD AUTO: 5 K/UL (ref 4.8–10.8)
WBC #/AREA URNS HPF: NORMAL /HPF

## 2022-04-06 PROCEDURE — 80053 COMPREHEN METABOLIC PANEL: CPT

## 2022-04-06 PROCEDURE — 83036 HEMOGLOBIN GLYCOSYLATED A1C: CPT | Mod: GA

## 2022-04-06 PROCEDURE — 81001 URINALYSIS AUTO W/SCOPE: CPT

## 2022-04-06 PROCEDURE — 80061 LIPID PANEL: CPT

## 2022-04-06 PROCEDURE — 84443 ASSAY THYROID STIM HORMONE: CPT

## 2022-04-06 PROCEDURE — 85025 COMPLETE CBC W/AUTO DIFF WBC: CPT

## 2022-04-06 PROCEDURE — 36415 COLL VENOUS BLD VENIPUNCTURE: CPT

## 2022-11-09 ENCOUNTER — PATIENT MESSAGE (OUTPATIENT)
Dept: HEALTH INFORMATION MANAGEMENT | Facility: OTHER | Age: 77
End: 2022-11-09

## 2023-06-14 ENCOUNTER — HOSPITAL ENCOUNTER (OUTPATIENT)
Dept: LAB | Facility: MEDICAL CENTER | Age: 78
End: 2023-06-14
Attending: FAMILY MEDICINE
Payer: MEDICARE

## 2023-06-14 LAB
ALBUMIN SERPL BCP-MCNC: 4.4 G/DL (ref 3.2–4.9)
ALBUMIN/GLOB SERPL: 1.8 G/DL
ALP SERPL-CCNC: 76 U/L (ref 30–99)
ALT SERPL-CCNC: 9 U/L (ref 2–50)
ANION GAP SERPL CALC-SCNC: 12 MMOL/L (ref 7–16)
AST SERPL-CCNC: 15 U/L (ref 12–45)
BASOPHILS # BLD AUTO: 0.6 % (ref 0–1.8)
BASOPHILS # BLD: 0.03 K/UL (ref 0–0.12)
BILIRUB SERPL-MCNC: 0.5 MG/DL (ref 0.1–1.5)
BUN SERPL-MCNC: 11 MG/DL (ref 8–22)
CALCIUM ALBUM COR SERPL-MCNC: 9 MG/DL (ref 8.5–10.5)
CALCIUM SERPL-MCNC: 9.3 MG/DL (ref 8.5–10.5)
CHLORIDE SERPL-SCNC: 104 MMOL/L (ref 96–112)
CHOLEST SERPL-MCNC: 235 MG/DL (ref 100–199)
CK SERPL-CCNC: 34 U/L (ref 0–154)
CO2 SERPL-SCNC: 25 MMOL/L (ref 20–33)
CREAT SERPL-MCNC: 0.51 MG/DL (ref 0.5–1.4)
CRP SERPL HS-MCNC: 3.1 MG/L (ref 0–3)
EOSINOPHIL # BLD AUTO: 0.05 K/UL (ref 0–0.51)
EOSINOPHIL NFR BLD: 1.1 % (ref 0–6.9)
ERYTHROCYTE [DISTWIDTH] IN BLOOD BY AUTOMATED COUNT: 43.2 FL (ref 35.9–50)
EST. AVERAGE GLUCOSE BLD GHB EST-MCNC: 114 MG/DL
FASTING STATUS PATIENT QL REPORTED: NORMAL
GFR SERPLBLD CREATININE-BSD FMLA CKD-EPI: 97 ML/MIN/1.73 M 2
GLOBULIN SER CALC-MCNC: 2.5 G/DL (ref 1.9–3.5)
GLUCOSE SERPL-MCNC: 92 MG/DL (ref 65–99)
HBA1C MFR BLD: 5.6 % (ref 4–5.6)
HCT VFR BLD AUTO: 45.4 % (ref 37–47)
HDLC SERPL-MCNC: 72 MG/DL
HGB BLD-MCNC: 15 G/DL (ref 12–16)
IMM GRANULOCYTES # BLD AUTO: 0.01 K/UL (ref 0–0.11)
IMM GRANULOCYTES NFR BLD AUTO: 0.2 % (ref 0–0.9)
LDLC SERPL CALC-MCNC: 140 MG/DL
LYMPHOCYTES # BLD AUTO: 1.28 K/UL (ref 1–4.8)
LYMPHOCYTES NFR BLD: 27.7 % (ref 22–41)
MCH RBC QN AUTO: 29.8 PG (ref 27–33)
MCHC RBC AUTO-ENTMCNC: 33 G/DL (ref 32.2–35.5)
MCV RBC AUTO: 90.3 FL (ref 81.4–97.8)
MONOCYTES # BLD AUTO: 0.42 K/UL (ref 0–0.85)
MONOCYTES NFR BLD AUTO: 9.1 % (ref 0–13.4)
NEUTROPHILS # BLD AUTO: 2.83 K/UL (ref 1.82–7.42)
NEUTROPHILS NFR BLD: 61.3 % (ref 44–72)
NRBC # BLD AUTO: 0 K/UL
NRBC BLD-RTO: 0 /100 WBC (ref 0–0.2)
PLATELET # BLD AUTO: 225 K/UL (ref 164–446)
PMV BLD AUTO: 10.1 FL (ref 9–12.9)
POTASSIUM SERPL-SCNC: 4.4 MMOL/L (ref 3.6–5.5)
PROT SERPL-MCNC: 6.9 G/DL (ref 6–8.2)
RBC # BLD AUTO: 5.03 M/UL (ref 4.2–5.4)
SODIUM SERPL-SCNC: 141 MMOL/L (ref 135–145)
TRIGL SERPL-MCNC: 117 MG/DL (ref 0–149)
TSH SERPL DL<=0.005 MIU/L-ACNC: 0.67 UIU/ML (ref 0.38–5.33)
WBC # BLD AUTO: 4.6 K/UL (ref 4.8–10.8)

## 2023-06-14 PROCEDURE — 82550 ASSAY OF CK (CPK): CPT

## 2023-06-14 PROCEDURE — 83036 HEMOGLOBIN GLYCOSYLATED A1C: CPT | Mod: GA

## 2023-06-14 PROCEDURE — 80061 LIPID PANEL: CPT

## 2023-06-14 PROCEDURE — 80053 COMPREHEN METABOLIC PANEL: CPT

## 2023-06-14 PROCEDURE — 85025 COMPLETE CBC W/AUTO DIFF WBC: CPT

## 2023-06-14 PROCEDURE — 84443 ASSAY THYROID STIM HORMONE: CPT

## 2023-06-14 PROCEDURE — 86141 C-REACTIVE PROTEIN HS: CPT

## 2023-06-14 PROCEDURE — 36415 COLL VENOUS BLD VENIPUNCTURE: CPT

## 2023-06-28 ENCOUNTER — HOSPITAL ENCOUNTER (OUTPATIENT)
Facility: MEDICAL CENTER | Age: 78
End: 2023-06-28
Attending: FAMILY MEDICINE
Payer: MEDICARE

## 2023-06-28 LAB
APPEARANCE UR: CLEAR
BACTERIA #/AREA URNS HPF: NEGATIVE /HPF
BILIRUB UR QL STRIP.AUTO: NEGATIVE
CAOX CRY #/AREA URNS HPF: ABNORMAL /HPF
COLOR UR: YELLOW
EPI CELLS #/AREA URNS HPF: ABNORMAL /HPF
GLUCOSE UR STRIP.AUTO-MCNC: NEGATIVE MG/DL
HYALINE CASTS #/AREA URNS LPF: ABNORMAL /LPF
KETONES UR STRIP.AUTO-MCNC: NEGATIVE MG/DL
LEUKOCYTE ESTERASE UR QL STRIP.AUTO: ABNORMAL
MICRO URNS: ABNORMAL
NITRITE UR QL STRIP.AUTO: NEGATIVE
PH UR STRIP.AUTO: 5.5 [PH] (ref 5–8)
PROT UR QL STRIP: NEGATIVE MG/DL
RBC # URNS HPF: ABNORMAL /HPF
RBC UR QL AUTO: NEGATIVE
SP GR UR STRIP.AUTO: 1.02
UROBILINOGEN UR STRIP.AUTO-MCNC: 0.2 MG/DL
WBC #/AREA URNS HPF: ABNORMAL /HPF

## 2023-06-28 PROCEDURE — 81001 URINALYSIS AUTO W/SCOPE: CPT

## 2023-11-17 NOTE — PROGRESS NOTES
Subjective:      Michelle Moon is a 74 y.o. female who presents with Suture / Staple Removal (staple removal on head)          Patient returns for suture removal today.  Sutures placed at this facility on 12/2/19.  Suture / Staple Removal         ROS       Objective:     /82   Pulse 60   Temp 36.7 °C (98 °F) (Temporal)   Resp 20   Wt 66.7 kg (147 lb)   SpO2 96%   BMI 22.35 kg/m²      Physical Exam       Scalp with healing laceration with 3 staples in place, there is still some gaping and incomplete closure with a fair amount of scabbing     Assessment/Plan:     1. Visit for wound check    Sutures have been in for 4-1/2 days.  She does have some gaping and incomplete closure and therefore I do not recommend removal today.  Recommend returning for removal in 3 days.    JOEY Elliott PA-C       No

## 2023-11-20 ENCOUNTER — HOSPITAL ENCOUNTER (OUTPATIENT)
Dept: LAB | Facility: MEDICAL CENTER | Age: 78
End: 2023-11-20
Attending: FAMILY MEDICINE
Payer: MEDICARE

## 2023-11-20 PROCEDURE — 36415 COLL VENOUS BLD VENIPUNCTURE: CPT

## 2023-11-20 PROCEDURE — 86480 TB TEST CELL IMMUN MEASURE: CPT | Mod: GY

## 2023-11-22 LAB
GAMMA INTERFERON BACKGROUND BLD IA-ACNC: 0.04 IU/ML
M TB IFN-G BLD-IMP: NEGATIVE
M TB IFN-G CD4+ BCKGRND COR BLD-ACNC: 0 IU/ML
MITOGEN IGNF BCKGRD COR BLD-ACNC: >10 IU/ML
QFT TB2 - NIL TBQ2: 0 IU/ML

## 2024-02-22 PROBLEM — M65.331 ACQUIRED TRIGGER FINGER OF RIGHT MIDDLE FINGER: Status: ACTIVE | Noted: 2024-02-22

## 2024-03-04 ENCOUNTER — APPOINTMENT (OUTPATIENT)
Dept: RADIOLOGY | Facility: MEDICAL CENTER | Age: 79
DRG: 086 | End: 2024-03-04
Attending: STUDENT IN AN ORGANIZED HEALTH CARE EDUCATION/TRAINING PROGRAM
Payer: MEDICARE

## 2024-03-04 ENCOUNTER — HOSPITAL ENCOUNTER (INPATIENT)
Facility: MEDICAL CENTER | Age: 79
LOS: 2 days | DRG: 086 | End: 2024-03-06
Attending: STUDENT IN AN ORGANIZED HEALTH CARE EDUCATION/TRAINING PROGRAM | Admitting: SURGERY
Payer: MEDICARE
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
## 2024-03-04 DIAGNOSIS — T14.90XA TRAUMA: ICD-10-CM

## 2024-03-04 DIAGNOSIS — S02.92XA MULTIPLE FACIAL FRACTURES, CLOSED, INITIAL ENCOUNTER (HCC): ICD-10-CM

## 2024-03-04 DIAGNOSIS — S06.5XAA SDH (SUBDURAL HEMATOMA) (HCC): ICD-10-CM

## 2024-03-04 DIAGNOSIS — M65.331 ACQUIRED TRIGGER FINGER OF RIGHT MIDDLE FINGER: ICD-10-CM

## 2024-03-04 DIAGNOSIS — I62.9 INTRACRANIAL BLEED (HCC): ICD-10-CM

## 2024-03-04 DIAGNOSIS — M79.641 RIGHT HAND PAIN: ICD-10-CM

## 2024-03-04 DIAGNOSIS — F03.90 DEMENTIA, UNSPECIFIED DEMENTIA SEVERITY, UNSPECIFIED DEMENTIA TYPE, UNSPECIFIED WHETHER BEHAVIORAL, PSYCHOTIC, OR MOOD DISTURBANCE OR ANXIETY (HCC): ICD-10-CM

## 2024-03-04 DIAGNOSIS — S01.01XA LACERATION OF SCALP WITHOUT FOREIGN BODY, INITIAL ENCOUNTER: ICD-10-CM
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
## 2024-03-04 DIAGNOSIS — S06.9XAA TRAUMATIC BRAIN INJURY, WITH UNKNOWN LOSS OF CONSCIOUSNESS STATUS, INITIAL ENCOUNTER (HCC): ICD-10-CM

## 2024-03-04 DIAGNOSIS — S02.92XA CLOSED FRACTURE OF FACIAL BONE DUE TO MOTOR VEHICLE ACCIDENT, INITIAL ENCOUNTER (HCC): ICD-10-CM

## 2024-03-04 DIAGNOSIS — S82.841A BIMALLEOLAR ANKLE FRACTURE, RIGHT, CLOSED, INITIAL ENCOUNTER: ICD-10-CM

## 2024-03-04 PROBLEM — E78.00 HIGH CHOLESTEROL: Chronic | Status: ACTIVE | Noted: 2024-03-04

## 2024-03-04 PROBLEM — F01.B2 MODERATE VASCULAR DEMENTIA WITH PSYCHOTIC DISTURBANCE (HCC): Chronic | Status: ACTIVE | Noted: 2024-03-04

## 2024-03-04 PROBLEM — I10 HYPERTENSION: Status: ACTIVE | Noted: 2024-03-04

## 2024-03-04 PROBLEM — Z53.09 CONTRAINDICATION TO DEEP VEIN THROMBOSIS (DVT) PROPHYLAXIS: Status: ACTIVE | Noted: 2024-03-04

## 2024-03-04 PROBLEM — F01.B2 MODERATE VASCULAR DEMENTIA WITH PSYCHOTIC DISTURBANCE (HCC): Status: ACTIVE | Noted: 2024-03-04

## 2024-03-04 LAB
ABO GROUP BLD: NORMAL
ALBUMIN SERPL BCP-MCNC: 4.1 G/DL (ref 3.2–4.9)
ALBUMIN/GLOB SERPL: 1.4 G/DL
ALP SERPL-CCNC: 71 U/L (ref 30–99)
ALT SERPL-CCNC: 13 U/L (ref 2–50)
ANION GAP SERPL CALC-SCNC: 16 MMOL/L (ref 7–16)
APTT PPP: 27.1 SEC (ref 24.7–36)
AST SERPL-CCNC: 20 U/L (ref 12–45)
BASOPHILS # BLD AUTO: 0.3 % (ref 0–1.8)
BASOPHILS # BLD: 0.03 K/UL (ref 0–0.12)
BILIRUB SERPL-MCNC: 0.5 MG/DL (ref 0.1–1.5)
BLD GP AB SCN SERPL QL: NORMAL
BUN SERPL-MCNC: 13 MG/DL (ref 8–22)
CALCIUM ALBUM COR SERPL-MCNC: 9.2 MG/DL (ref 8.5–10.5)
CALCIUM SERPL-MCNC: 9.3 MG/DL (ref 8.5–10.5)
CFT BLD TEG: 5.2 MIN (ref 4.6–9.1)
CFT P HPASE BLD TEG: 5.3 MIN (ref 4.3–8.3)
CHLORIDE SERPL-SCNC: 103 MMOL/L (ref 96–112)
CLOT ANGLE BLD TEG: 73 DEGREES (ref 63–78)
CLOT LYSIS 30M P MA LENFR BLD TEG: 3.1 % (ref 0–2.6)
CO2 SERPL-SCNC: 20 MMOL/L (ref 20–33)
CREAT SERPL-MCNC: 0.52 MG/DL (ref 0.5–1.4)
CT.EXTRINSIC BLD ROTEM: 1.3 MIN (ref 0.8–2.1)
EKG IMPRESSION: NORMAL
EOSINOPHIL # BLD AUTO: 0.04 K/UL (ref 0–0.51)
EOSINOPHIL NFR BLD: 0.4 % (ref 0–6.9)
ERYTHROCYTE [DISTWIDTH] IN BLOOD BY AUTOMATED COUNT: 42.5 FL (ref 35.9–50)
GFR SERPLBLD CREATININE-BSD FMLA CKD-EPI: 96 ML/MIN/1.73 M 2
GLOBULIN SER CALC-MCNC: 2.9 G/DL (ref 1.9–3.5)
GLUCOSE SERPL-MCNC: 154 MG/DL (ref 65–99)
HCT VFR BLD AUTO: 43.8 % (ref 37–47)
HGB BLD-MCNC: 14.8 G/DL (ref 12–16)
IMM GRANULOCYTES # BLD AUTO: 0.04 K/UL (ref 0–0.11)
IMM GRANULOCYTES NFR BLD AUTO: 0.4 % (ref 0–0.9)
INR PPP: 1.04 (ref 0.87–1.13)
LYMPHOCYTES # BLD AUTO: 2.46 K/UL (ref 1–4.8)
LYMPHOCYTES NFR BLD: 24.2 % (ref 22–41)
MCF BLD TEG: 59 MM (ref 52–69)
MCF.PLATELET INHIB BLD ROTEM: 19.9 MM (ref 15–32)
MCH RBC QN AUTO: 28.9 PG (ref 27–33)
MCHC RBC AUTO-ENTMCNC: 33.8 G/DL (ref 32.2–35.5)
MCV RBC AUTO: 85.5 FL (ref 81.4–97.8)
MONOCYTES # BLD AUTO: 0.85 K/UL (ref 0–0.85)
MONOCYTES NFR BLD AUTO: 8.4 % (ref 0–13.4)
NEUTROPHILS # BLD AUTO: 6.74 K/UL (ref 1.82–7.42)
NEUTROPHILS NFR BLD: 66.3 % (ref 44–72)
NRBC # BLD AUTO: 0 K/UL
NRBC BLD-RTO: 0 /100 WBC (ref 0–0.2)
PA AA BLD-ACNC: 0 % (ref 0–11)
PA ADP BLD-ACNC: 0 % (ref 0–17)
PLATELET # BLD AUTO: 246 K/UL (ref 164–446)
PMV BLD AUTO: 10.5 FL (ref 9–12.9)
POTASSIUM SERPL-SCNC: 3.4 MMOL/L (ref 3.6–5.5)
PROT SERPL-MCNC: 7 G/DL (ref 6–8.2)
PROTHROMBIN TIME: 13.7 SEC (ref 12–14.6)
RBC # BLD AUTO: 5.12 M/UL (ref 4.2–5.4)
RH BLD: NORMAL
SODIUM SERPL-SCNC: 139 MMOL/L (ref 135–145)
TEG ALGORITHM TGALG: ABNORMAL
WBC # BLD AUTO: 10.2 K/UL (ref 4.8–10.8)

## 2024-03-04 PROCEDURE — 70450 CT HEAD/BRAIN W/O DYE: CPT

## 2024-03-04 PROCEDURE — 85025 COMPLETE CBC W/AUTO DIFF WBC: CPT

## 2024-03-04 PROCEDURE — 85347 COAGULATION TIME ACTIVATED: CPT

## 2024-03-04 PROCEDURE — 72125 CT NECK SPINE W/O DYE: CPT

## 2024-03-04 PROCEDURE — 96376 TX/PRO/DX INJ SAME DRUG ADON: CPT

## 2024-03-04 PROCEDURE — 80053 COMPREHEN METABOLIC PANEL: CPT

## 2024-03-04 PROCEDURE — 36415 COLL VENOUS BLD VENIPUNCTURE: CPT

## 2024-03-04 PROCEDURE — 770022 HCHG ROOM/CARE - ICU (200)

## 2024-03-04 PROCEDURE — 96374 THER/PROPH/DIAG INJ IV PUSH: CPT

## 2024-03-04 PROCEDURE — 90715 TDAP VACCINE 7 YRS/> IM: CPT | Performed by: STUDENT IN AN ORGANIZED HEALTH CARE EDUCATION/TRAINING PROGRAM

## 2024-03-04 PROCEDURE — 700105 HCHG RX REV CODE 258: Performed by: SURGERY

## 2024-03-04 PROCEDURE — A9270 NON-COVERED ITEM OR SERVICE: HCPCS | Performed by: SURGERY

## 2024-03-04 PROCEDURE — 70486 CT MAXILLOFACIAL W/O DYE: CPT

## 2024-03-04 PROCEDURE — 86900 BLOOD TYPING SEROLOGIC ABO: CPT

## 2024-03-04 PROCEDURE — 700101 HCHG RX REV CODE 250: Performed by: STUDENT IN AN ORGANIZED HEALTH CARE EDUCATION/TRAINING PROGRAM

## 2024-03-04 PROCEDURE — 85384 FIBRINOGEN ACTIVITY: CPT

## 2024-03-04 PROCEDURE — 86901 BLOOD TYPING SEROLOGIC RH(D): CPT

## 2024-03-04 PROCEDURE — 93005 ELECTROCARDIOGRAM TRACING: CPT | Performed by: STUDENT IN AN ORGANIZED HEALTH CARE EDUCATION/TRAINING PROGRAM

## 2024-03-04 PROCEDURE — 85610 PROTHROMBIN TIME: CPT

## 2024-03-04 PROCEDURE — 700111 HCHG RX REV CODE 636 W/ 250 OVERRIDE (IP)

## 2024-03-04 PROCEDURE — 85576 BLOOD PLATELET AGGREGATION: CPT | Mod: 91

## 2024-03-04 PROCEDURE — 99291 CRITICAL CARE FIRST HOUR: CPT

## 2024-03-04 PROCEDURE — 90471 IMMUNIZATION ADMIN: CPT

## 2024-03-04 PROCEDURE — 96375 TX/PRO/DX INJ NEW DRUG ADDON: CPT

## 2024-03-04 PROCEDURE — 700111 HCHG RX REV CODE 636 W/ 250 OVERRIDE (IP): Mod: JZ | Performed by: STUDENT IN AN ORGANIZED HEALTH CARE EDUCATION/TRAINING PROGRAM

## 2024-03-04 PROCEDURE — 86850 RBC ANTIBODY SCREEN: CPT

## 2024-03-04 PROCEDURE — G0390 TRAUMA RESPONS W/HOSP CRITI: HCPCS

## 2024-03-04 PROCEDURE — 700102 HCHG RX REV CODE 250 W/ 637 OVERRIDE(OP): Performed by: SURGERY

## 2024-03-04 PROCEDURE — 85730 THROMBOPLASTIN TIME PARTIAL: CPT

## 2024-03-04 PROCEDURE — 99222 1ST HOSP IP/OBS MODERATE 55: CPT | Performed by: SURGERY

## 2024-03-04 PROCEDURE — 71045 X-RAY EXAM CHEST 1 VIEW: CPT

## 2024-03-04 RX ORDER — ENEMA 19; 7 G/133ML; G/133ML
1 ENEMA RECTAL
Status: DISCONTINUED | OUTPATIENT
Start: 2024-03-04 | End: 2024-03-05

## 2024-03-04 RX ORDER — SODIUM CHLORIDE 9 MG/ML
INJECTION, SOLUTION INTRAVENOUS CONTINUOUS
Status: DISCONTINUED | OUTPATIENT
Start: 2024-03-04 | End: 2024-03-05

## 2024-03-04 RX ORDER — POLYETHYLENE GLYCOL 3350 17 G/17G
1 POWDER, FOR SOLUTION ORAL 2 TIMES DAILY
Status: DISCONTINUED | OUTPATIENT
Start: 2024-03-04 | End: 2024-03-06 | Stop reason: HOSPADM

## 2024-03-04 RX ORDER — DOCUSATE SODIUM 100 MG/1
100 CAPSULE, LIQUID FILLED ORAL 2 TIMES DAILY
Status: DISCONTINUED | OUTPATIENT
Start: 2024-03-04 | End: 2024-03-06 | Stop reason: HOSPADM

## 2024-03-04 RX ORDER — OXYCODONE HYDROCHLORIDE 5 MG/1
2.5 TABLET ORAL
Status: DISCONTINUED | OUTPATIENT
Start: 2024-03-04 | End: 2024-03-05

## 2024-03-04 RX ORDER — LEVETIRACETAM 500 MG/5ML
1500 INJECTION, SOLUTION, CONCENTRATE INTRAVENOUS ONCE
Status: COMPLETED | OUTPATIENT
Start: 2024-03-04 | End: 2024-03-04

## 2024-03-04 RX ORDER — ROSUVASTATIN CALCIUM 5 MG/1
20 TABLET, COATED ORAL
Status: DISCONTINUED | OUTPATIENT
Start: 2024-03-04 | End: 2024-03-06 | Stop reason: HOSPADM

## 2024-03-04 RX ORDER — ONDANSETRON 4 MG/1
4 TABLET, ORALLY DISINTEGRATING ORAL EVERY 4 HOURS PRN
Status: DISCONTINUED | OUTPATIENT
Start: 2024-03-04 | End: 2024-03-06 | Stop reason: HOSPADM

## 2024-03-04 RX ORDER — AMOXICILLIN 250 MG
1 CAPSULE ORAL NIGHTLY
Status: DISCONTINUED | OUTPATIENT
Start: 2024-03-04 | End: 2024-03-06 | Stop reason: HOSPADM

## 2024-03-04 RX ORDER — BISACODYL 10 MG
10 SUPPOSITORY, RECTAL RECTAL
Status: DISCONTINUED | OUTPATIENT
Start: 2024-03-04 | End: 2024-03-06 | Stop reason: HOSPADM

## 2024-03-04 RX ORDER — PROPARACAINE HYDROCHLORIDE 5 MG/ML
1 SOLUTION/ DROPS OPHTHALMIC ONCE
Status: COMPLETED | OUTPATIENT
Start: 2024-03-04 | End: 2024-03-04

## 2024-03-04 RX ORDER — CHOLECALCIFEROL (VITAMIN D3) 50 MCG
2000 TABLET ORAL
COMMUNITY

## 2024-03-04 RX ORDER — ONDANSETRON 2 MG/ML
INJECTION INTRAMUSCULAR; INTRAVENOUS
Status: COMPLETED
Start: 2024-03-04 | End: 2024-03-04

## 2024-03-04 RX ORDER — HYDROMORPHONE HYDROCHLORIDE 1 MG/ML
0.25 INJECTION, SOLUTION INTRAMUSCULAR; INTRAVENOUS; SUBCUTANEOUS
Status: DISCONTINUED | OUTPATIENT
Start: 2024-03-04 | End: 2024-03-05

## 2024-03-04 RX ORDER — OXYCODONE HYDROCHLORIDE 5 MG/1
5 TABLET ORAL
Status: DISCONTINUED | OUTPATIENT
Start: 2024-03-04 | End: 2024-03-05

## 2024-03-04 RX ORDER — AMOXICILLIN 250 MG
1 CAPSULE ORAL
Status: DISCONTINUED | OUTPATIENT
Start: 2024-03-04 | End: 2024-03-06 | Stop reason: HOSPADM

## 2024-03-04 RX ORDER — LISINOPRIL 20 MG/1
20 TABLET ORAL DAILY
Status: DISCONTINUED | OUTPATIENT
Start: 2024-03-05 | End: 2024-03-06 | Stop reason: HOSPADM

## 2024-03-04 RX ORDER — ONDANSETRON 2 MG/ML
4 INJECTION INTRAMUSCULAR; INTRAVENOUS EVERY 4 HOURS PRN
Status: DISCONTINUED | OUTPATIENT
Start: 2024-03-04 | End: 2024-03-06 | Stop reason: HOSPADM

## 2024-03-04 RX ORDER — OMEPRAZOLE 20 MG/1
20 CAPSULE, DELAYED RELEASE ORAL EVERY EVENING
Status: DISCONTINUED | OUTPATIENT
Start: 2024-03-05 | End: 2024-03-06 | Stop reason: HOSPADM

## 2024-03-04 RX ORDER — ACETAMINOPHEN 325 MG/1
650 TABLET ORAL EVERY 6 HOURS
Status: DISCONTINUED | OUTPATIENT
Start: 2024-03-04 | End: 2024-03-06 | Stop reason: HOSPADM

## 2024-03-04 RX ORDER — LIDOCAINE HYDROCHLORIDE AND EPINEPHRINE 10; 10 MG/ML; UG/ML
10 INJECTION, SOLUTION INFILTRATION; PERINEURAL ONCE
Status: DISCONTINUED | OUTPATIENT
Start: 2024-03-04 | End: 2024-03-05

## 2024-03-04 RX ORDER — ONDANSETRON 2 MG/ML
4 INJECTION INTRAMUSCULAR; INTRAVENOUS ONCE
Status: COMPLETED | OUTPATIENT
Start: 2024-03-04 | End: 2024-03-04

## 2024-03-04 RX ORDER — FLUOXETINE HYDROCHLORIDE 20 MG/1
20 CAPSULE ORAL DAILY
Status: DISCONTINUED | OUTPATIENT
Start: 2024-03-05 | End: 2024-03-06 | Stop reason: HOSPADM

## 2024-03-04 RX ORDER — ACETAMINOPHEN 325 MG/1
650 TABLET ORAL EVERY 6 HOURS PRN
Status: DISCONTINUED | OUTPATIENT
Start: 2024-03-09 | End: 2024-03-06 | Stop reason: HOSPADM

## 2024-03-04 RX ADMIN — FLUORESCEIN SODIUM 1 MG: 1 STRIP OPHTHALMIC at 16:56

## 2024-03-04 RX ADMIN — ACETAMINOPHEN 650 MG: 325 TABLET, FILM COATED ORAL at 20:27

## 2024-03-04 RX ADMIN — SODIUM CHLORIDE: 9 INJECTION, SOLUTION INTRAVENOUS at 20:16

## 2024-03-04 RX ADMIN — ONDANSETRON 4 MG: 2 INJECTION INTRAMUSCULAR; INTRAVENOUS at 16:10

## 2024-03-04 RX ADMIN — LEVETIRACETAM 1500 MG: 100 INJECTION, SOLUTION, CONCENTRATE INTRAVENOUS at 17:08

## 2024-03-04 RX ADMIN — ROSUVASTATIN CALCIUM 20 MG: 20 TABLET, FILM COATED ORAL at 20:27

## 2024-03-04 RX ADMIN — PROPARACAINE HYDROCHLORIDE 1 DROP: 5 SOLUTION/ DROPS OPHTHALMIC at 17:00

## 2024-03-04 RX ADMIN — ONDANSETRON 4 MG: 2 INJECTION INTRAMUSCULAR; INTRAVENOUS at 16:45

## 2024-03-04 RX ADMIN — CLOSTRIDIUM TETANI TOXOID ANTIGEN (FORMALDEHYDE INACTIVATED), CORYNEBACTERIUM DIPHTHERIAE TOXOID ANTIGEN (FORMALDEHYDE INACTIVATED), BORDETELLA PERTUSSIS TOXOID ANTIGEN (GLUTARALDEHYDE INACTIVATED), BORDETELLA PERTUSSIS FILAMENTOUS HEMAGGLUTININ ANTIGEN (FORMALDEHYDE INACTIVATED), BORDETELLA PERTUSSIS PERTACTIN ANTIGEN, AND BORDETELLA PERTUSSIS FIMBRIAE 2/3 ANTIGEN 0.5 ML: 5; 2; 2.5; 5; 3; 5 INJECTION, SUSPENSION INTRAMUSCULAR at 16:48

## 2024-03-04 ASSESSMENT — FIBROSIS 4 INDEX
FIB4 SCORE: 1.688888888888888889
FIB4 SCORE: 1.688888888888888889
FIB4 SCORE: 1.71

## 2024-03-05 PROBLEM — Z01.89 ENCOUNTER FOR GERIATRIC ASSESSMENT: Status: ACTIVE | Noted: 2024-03-05

## 2024-03-05 PROBLEM — F03.90 DEMENTIA (HCC): Status: ACTIVE | Noted: 2024-03-05

## 2024-03-05 PROCEDURE — 770001 HCHG ROOM/CARE - MED/SURG/GYN PRIV*

## 2024-03-05 PROCEDURE — 92523 SPEECH SOUND LANG COMPREHEN: CPT

## 2024-03-05 PROCEDURE — 700102 HCHG RX REV CODE 250 W/ 637 OVERRIDE(OP): Performed by: SURGERY

## 2024-03-05 PROCEDURE — 99232 SBSQ HOSP IP/OBS MODERATE 35: CPT

## 2024-03-05 PROCEDURE — 700111 HCHG RX REV CODE 636 W/ 250 OVERRIDE (IP): Mod: JZ

## 2024-03-05 PROCEDURE — 97166 OT EVAL MOD COMPLEX 45 MIN: CPT

## 2024-03-05 PROCEDURE — 700102 HCHG RX REV CODE 250 W/ 637 OVERRIDE(OP)

## 2024-03-05 PROCEDURE — A9270 NON-COVERED ITEM OR SERVICE: HCPCS | Performed by: INTERNAL MEDICINE

## 2024-03-05 PROCEDURE — A9270 NON-COVERED ITEM OR SERVICE: HCPCS | Performed by: SURGERY

## 2024-03-05 PROCEDURE — 99223 1ST HOSP IP/OBS HIGH 75: CPT | Mod: AI | Performed by: INTERNAL MEDICINE

## 2024-03-05 PROCEDURE — 700102 HCHG RX REV CODE 250 W/ 637 OVERRIDE(OP): Performed by: INTERNAL MEDICINE

## 2024-03-05 PROCEDURE — A9270 NON-COVERED ITEM OR SERVICE: HCPCS

## 2024-03-05 RX ORDER — HYDROXYZINE HYDROCHLORIDE 25 MG/1
25 TABLET, FILM COATED ORAL 4 TIMES DAILY PRN
Status: DISCONTINUED | OUTPATIENT
Start: 2024-03-05 | End: 2024-03-06 | Stop reason: HOSPADM

## 2024-03-05 RX ORDER — HYDROXYZINE HYDROCHLORIDE 25 MG/1
25 TABLET, FILM COATED ORAL 3 TIMES DAILY PRN
Status: DISCONTINUED | OUTPATIENT
Start: 2024-03-05 | End: 2024-03-05

## 2024-03-05 RX ORDER — OXYCODONE HYDROCHLORIDE 5 MG/1
2.5 TABLET ORAL EVERY 4 HOURS PRN
Status: DISCONTINUED | OUTPATIENT
Start: 2024-03-05 | End: 2024-03-06 | Stop reason: HOSPADM

## 2024-03-05 RX ORDER — HALOPERIDOL 5 MG/ML
2.5 INJECTION INTRAMUSCULAR EVERY 4 HOURS PRN
Status: DISCONTINUED | OUTPATIENT
Start: 2024-03-05 | End: 2024-03-06

## 2024-03-05 RX ORDER — QUETIAPINE FUMARATE 25 MG/1
25 TABLET, FILM COATED ORAL NIGHTLY
Status: DISCONTINUED | OUTPATIENT
Start: 2024-03-05 | End: 2024-03-06

## 2024-03-05 RX ADMIN — ACETAMINOPHEN 650 MG: 325 TABLET, FILM COATED ORAL at 17:17

## 2024-03-05 RX ADMIN — ACETAMINOPHEN 650 MG: 325 TABLET, FILM COATED ORAL at 05:40

## 2024-03-05 RX ADMIN — ACETAMINOPHEN 650 MG: 325 TABLET, FILM COATED ORAL at 12:51

## 2024-03-05 RX ADMIN — OMEPRAZOLE 20 MG: 20 CAPSULE, DELAYED RELEASE ORAL at 05:39

## 2024-03-05 RX ADMIN — HYDROXYZINE HYDROCHLORIDE 25 MG: 25 TABLET, FILM COATED ORAL at 13:26

## 2024-03-05 RX ADMIN — FLUOXETINE HYDROCHLORIDE 20 MG: 20 CAPSULE ORAL at 05:39

## 2024-03-05 RX ADMIN — ACETAMINOPHEN 650 MG: 325 TABLET, FILM COATED ORAL at 00:02

## 2024-03-05 RX ADMIN — LISINOPRIL 20 MG: 20 TABLET ORAL at 05:40

## 2024-03-05 RX ADMIN — HALOPERIDOL LACTATE 2.5 MG: 5 INJECTION, SOLUTION INTRAMUSCULAR at 22:56

## 2024-03-05 RX ADMIN — HYDROXYZINE HYDROCHLORIDE 25 MG: 25 TABLET, FILM COATED ORAL at 17:17

## 2024-03-05 RX ADMIN — DOCUSATE SODIUM 100 MG: 100 CAPSULE, LIQUID FILLED ORAL at 05:39

## 2024-03-05 ASSESSMENT — ENCOUNTER SYMPTOMS
FEVER: 0
ABDOMINAL PAIN: 0
NECK PAIN: 0
TINGLING: 0
DOUBLE VISION: 0
VOMITING: 0
NAUSEA: 0
HEADACHES: 1
EYE PAIN: 0
FOCAL WEAKNESS: 0
WEAKNESS: 0
BLURRED VISION: 0
MYALGIAS: 0
TREMORS: 0
DIAPHORESIS: 0
PALPITATIONS: 0
PHOTOPHOBIA: 0
SHORTNESS OF BREATH: 0
DIZZINESS: 0
BACK PAIN: 0
SENSORY CHANGE: 0
CHILLS: 0
SPEECH CHANGE: 0

## 2024-03-05 ASSESSMENT — COGNITIVE AND FUNCTIONAL STATUS - GENERAL
TOILETING: A LITTLE
SUGGESTED CMS G CODE MODIFIER MOBILITY: CK
MOVING TO AND FROM BED TO CHAIR: A LITTLE
CLIMB 3 TO 5 STEPS WITH RAILING: A LITTLE
EATING MEALS: A LITTLE
MOVING FROM LYING ON BACK TO SITTING ON SIDE OF FLAT BED: A LITTLE
DRESSING REGULAR UPPER BODY CLOTHING: A LITTLE
HELP NEEDED FOR BATHING: A LITTLE
DAILY ACTIVITIY SCORE: 16
SUGGESTED CMS G CODE MODIFIER DAILY ACTIVITY: CK
DAILY ACTIVITIY SCORE: 18
DRESSING REGULAR LOWER BODY CLOTHING: A LITTLE
EATING MEALS: A LITTLE
TOILETING: A LITTLE
PERSONAL GROOMING: A LITTLE
TURNING FROM BACK TO SIDE WHILE IN FLAT BAD: A LITTLE
STANDING UP FROM CHAIR USING ARMS: A LITTLE
WALKING IN HOSPITAL ROOM: A LITTLE
HELP NEEDED FOR BATHING: A LITTLE
PERSONAL GROOMING: A LITTLE
DRESSING REGULAR LOWER BODY CLOTHING: A LOT
MOBILITY SCORE: 18
SUGGESTED CMS G CODE MODIFIER DAILY ACTIVITY: CK
DRESSING REGULAR UPPER BODY CLOTHING: A LOT

## 2024-03-05 ASSESSMENT — PATIENT HEALTH QUESTIONNAIRE - PHQ9
SUM OF ALL RESPONSES TO PHQ9 QUESTIONS 1 AND 2: 0
1. LITTLE INTEREST OR PLEASURE IN DOING THINGS: NOT AT ALL
2. FEELING DOWN, DEPRESSED, IRRITABLE, OR HOPELESS: NOT AT ALL

## 2024-03-05 ASSESSMENT — LIFESTYLE VARIABLES
HOW MANY TIMES IN THE PAST YEAR HAVE YOU HAD 5 OR MORE DRINKS IN A DAY: 0
HAVE YOU EVER FELT YOU SHOULD CUT DOWN ON YOUR DRINKING: NO
AVERAGE NUMBER OF DAYS PER WEEK YOU HAVE A DRINK CONTAINING ALCOHOL: 5
EVER HAD A DRINK FIRST THING IN THE MORNING TO STEADY YOUR NERVES TO GET RID OF A HANGOVER: NO
TOTAL SCORE: 0
CONSUMPTION TOTAL: NEGATIVE
TOTAL SCORE: 0
TOTAL SCORE: 0
ON A TYPICAL DAY WHEN YOU DRINK ALCOHOL HOW MANY DRINKS DO YOU HAVE: 1
EVER FELT BAD OR GUILTY ABOUT YOUR DRINKING: NO
ALCOHOL_USE: YES
HAVE PEOPLE ANNOYED YOU BY CRITICIZING YOUR DRINKING: NO

## 2024-03-05 ASSESSMENT — ACTIVITIES OF DAILY LIVING (ADL): TOILETING: INDEPENDENT

## 2024-03-05 ASSESSMENT — PAIN DESCRIPTION - PAIN TYPE: TYPE: ACUTE PAIN

## 2024-03-05 NOTE — CARE PLAN
Problem: Knowledge Deficit - Standard  Goal: Patient and family/care givers will demonstrate understanding of plan of care, disease process/condition, diagnostic tests and medications  Outcome: Progressing     Problem: Skin Integrity  Goal: Skin integrity is maintained or improved  Outcome: Progressing     Problem: Fall Risk  Goal: Patient will remain free from falls  Outcome: Progressing     Problem: Pain - Standard  Goal: Alleviation of pain or a reduction in pain to the patient’s comfort goal  Outcome: Progressing   The patient is Watcher - Medium risk of patient condition declining or worsening    Shift Goals  Clinical Goals: stable neuro checks  Patient Goals: get sleep  Family Goals: rest and updates

## 2024-03-05 NOTE — ASSESSMENT & PLAN NOTE
Left periorbital extending into squamous portion of the temporal bone, minimally displaced zygomatic fracture, small periorbital hematoma  Normal pupillary reflexes and full extraocular movement without entrapment  Non-operative management.  Daryn Rajan DDS, MD. [facility].

## 2024-03-05 NOTE — CARE PLAN
The patient is Stable - Low risk of patient condition declining or worsening    Shift Goals  Clinical Goals: Stable neuro, safety  Patient Goals: ARAVIND, pleasently confused  Family Goals: ARAVIND, none present    Progress made toward(s) clinical / shift goals:    Problem: Knowledge Deficit - Standard  Goal: Patient and family/care givers will demonstrate understanding of plan of care, disease process/condition, diagnostic tests and medications  Outcome: Progressing     Problem: Skin Integrity  Goal: Skin integrity is maintained or improved  Outcome: Progressing     Problem: Fall Risk  Goal: Patient will remain free from falls  Outcome: Progressing     Problem: Pain - Standard  Goal: Alleviation of pain or a reduction in pain to the patient’s comfort goal  Outcome: Progressing       Patient is not progressing towards the following goals:

## 2024-03-05 NOTE — H&P
Surgery General History & Physical Note    Date  3/4/2024    Primary Care Physician  Blake Del Angel M.D.    CC  Patient fell on steps  No loss of consciousness  Brought to the emergency room for evaluation as a TBI alert  Positive findings on CT including facial fractures and small subdural hematoma upgraded to trauma green    HPI  This is a 78 y.o. female who presented with injuries sustained in a ground-level fall she fell on steps missing a step and landed on a corner.  There is no loss of consciousness patient did have periorbital soft tissue injury and fractures did have bleeding from her nose and has been nauseous and throwing up some blood.  She has dementia with significant loss of recent memory she says everything hurts    Past Medical History:   Diagnosis Date    High cholesterol     Hypertension     Small intestinal hemorrhage not requiring more than four units of blood in 24 hours, admission to ICU, or surgery 2018    Stomach ulcer        Past Surgical History:   Procedure Laterality Date    ORIF, ANKLE Right 1/22/2019    Procedure: ANKLE ORIF;  Surgeon: Rajan Griffin M.D.;  Location: SURGERY [facility];  Service: Orthopedics    COLONOSCOPY - ENDO N/A 6/18/2018    Procedure: COLONOSCOPY - ENDO;  Surgeon: Carlos Rudolph M.D.;  Location: ENDOSCOPY [facility];  Service: Gastroenterology    GASTROSCOPY-ENDO  6/16/2018    Procedure: GASTROSCOPY-ENDO;  Surgeon: Braden Monge M.D.;  Location: ENDOSCOPY [facility];  Service: Gastroenterology    IRRIGATION & DEBRIDEMENT ORTHO Right 7/23/2015    Procedure: IRRIGATION & DEBRIDEMENT ORTHO KNEE ;  Surgeon: Sameer Kwan M.D.;  Location: SURGERY [facility];  Service:     ORIF, PATELLA  7/23/2015    Procedure: PATELLA ORIF REVISION;  Surgeon: Sameer Kwan M.D.;  Location: SURGERY [facility];  Service:     IRRIGATION & DEBRIDEMENT ORTHO Right 7/21/2015    Procedure: IRRIGATION & DEBRIDEMENT ORTHO;  Surgeon: Sameer SINGH  Continue Lipitor KATELYNN Kwan;  Location: SURGERY [facility];  Service:     HARDWARE REMOVAL ORTHO  7/21/2015    Procedure: HARDWARE REMOVAL PATELLA;  Surgeon: Sameer Kwan M.D.;  Location: SURGERY [facility];  Service:     SEPTOPLASTY  10/7/2010    Performed by TRISTON SYLVESTER at SURGERY SAME DAY [facility] ORS    SEPTOPLASTY  4/26/2010    Performed by TRISTON SYLVESTER at SURGERY SAME DAY [facility] ORS    TURBINATE REDUCTION  4/26/2010    Performed by TRISTON SYLVESTER at SURGERY SAME DAY [facility] ORS    HYSTERECTOMY LAPAROSCOPY  1994    UT BREAST REDUCTION         Current Facility-Administered Medications   Medication Dose Route Frequency Provider Last Rate Last Admin    lidocaine-epinephrine 1 %-1:934068 1 %-1:279643 injection 10 mL  10 mL Injection Once Javier Sauceda M.D.         Current Outpatient Medications   Medication Sig Dispense Refill    hydrOXYzine HCl (ATARAX) 25 MG Tab TAKE 1 TABLET BY MOUTH FOUR TIMES A DAY AS NEEDED      QUEtiapine (SEROQUEL) 25 MG Tab Take 25 mg by mouth at bedtime.      GEMTESA 75 MG tablet Take 75 mg by mouth every day.      rosuvastatin (CRESTOR) 20 MG Tab Take 20 mg by mouth at bedtime.      lisinopril (PRINIVIL) 20 MG Tab Take 20 mg by mouth every day.      omeprazole (PRILOSEC) 20 MG delayed-release capsule Take 20 mg by mouth every evening.      rosuvastatin (CRESTOR) 40 MG tablet Take 40 mg by mouth every evening.      denosumab (PROLIA) 60 MG/ML Solution Inject 60 mg as instructed Once. Every 6 months      FLUoxetine (PROZAC) 20 MG Cap Take 20 mg by mouth every day.         Social History     Socioeconomic History    Marital status:      Spouse name: Not on file    Number of children: Not on file    Years of education: Not on file    Highest education level: Not on file   Occupational History    Not on file   Tobacco Use    Smoking status: Never    Smokeless tobacco: Never   Vaping Use    Vaping Use: Never used   Substance and Sexual Activity    Alcohol  use: Yes     Comment: 3-4 per week    Drug use: No    Sexual activity: Not on file   Other Topics Concern    Not on file   Social History Narrative    Not on file     Social Determinants of Health     Financial Resource Strain: Not on file   Food Insecurity: Not on file   Transportation Needs: Not on file   Physical Activity: Not on file   Stress: Not on file   Social Connections: Not on file   Intimate Partner Violence: Not on file   Housing Stability: Not on file       Family History   Problem Relation Age of Onset    Hypertension Other     Cancer Other        Allergies  Nkda [no known drug allergy]    Review of Systems  Negative except for everything hurts denies visual obscuration    Physical Exam    Vital Signs  Blood Pressure : (!) 151/98   Temperature: 35.9 °C (96.6 °F)   Pulse: 94   Respiration: 20   Pulse Oximetry: 98 %   Patient is arousable and oriented x 1.  She is cooperative and follows commands.  Neurologically there are no focal cranial nerve deficits she moves all extremities.  HEENT examination is remarkable for left periorbital ecchymosis and some dried blood in the left ear canal.  She does not have ongoing otorrhea or rhinorrhea.  She is tender about the left face there are no suturable lacerations.  She denies malocclusion.  There are no cranial nerve deficits capillary responses are normal she denies visual changes and has full extraocular movements without entrapment neck shows good range of motion without significant tenderness cervical collar was removed.  There is no evidence of chest trauma breasts are atraumatic heart tones are distant lungs are clear abdomen is soft and benign extremities do not show significant traumatic deformities.  She has had knee surgeries.  Back was nontender.  Labs:  Recent Labs     03/04/24  1630   WBC 10.2   RBC 5.12   HEMOGLOBIN 14.8   HEMATOCRIT 43.8   MCV 85.5   MCH 28.9   MCHC 33.8   RDW 42.5   PLATELETCT 246   MPV 10.5     Recent Labs     03/04/24  1630    SODIUM 139   POTASSIUM 3.4*   CHLORIDE 103   CO2 20   GLUCOSE 154*   BUN 13   CREATININE 0.52   CALCIUM 9.3     Recent Labs     03/04/24  1630   APTT 27.1   INR 1.04     Recent Labs     03/04/24  1630   ASTSGOT 20   ALTSGPT 13   TBILIRUBIN 0.5   ALKPHOSPHAT 71   GLOBULIN 2.9   INR 1.04       Radiology:  DX-CHEST-PORTABLE (1 VIEW)   Final Result      1.  There are no acute displaced fractures and there is no pneumothorax.   2.  Borderline enlarged cardiac silhouette with chronic interstitial opacities.      CT-MAXILLOFACIAL W/O PLUS RECONS   Final Result      1.  Fractures of the LEFT zygomatic arch, LEFT greater sphenoid wing LEFT lateral orbital wall, squamous LEFT temporal bone and longitudinal fracture through the LEFT temporal bone with fluid in the LEFT middle ear   2.  4 mm LEFT temporal extra-axial hematoma with scant gas internal could be subdural or epidural   3.  LEFT lateral orbital extraconal hematoma with gas with mild LEFT proptosis   4.  Intravenous gas in the soft tissues and BILATERAL cavernous sinus      Findings were discussed with JOSEPHINE SAUCEDA on 3/4/2024 4:35 PM.      CT-CSPINE WITHOUT PLUS RECONS   Final Result      1.  Moderate osteoarthritic change at C5-6 and C6-7 levels.      2.  No evidence for acute fracture and/or subluxation of cervical spine.      CT-HEAD W/O   Final Result      1.  Cerebral atrophy.      2.  White matter lucencies most consistent with small vessel ischemic change versus demyelination or gliosis.      3. Sphenoid sinusitis.               Assessment/Plan:  Patient has an exceedingly small 4.1 mm eraser head sized hematoma in the left temporal area associated with a fracture of the temporal bone.  She has significant cerebral atrophy.  She does have associated skull and basilar skull fractures with left periorbital fractures.  Dr. Sauceda discussed findings with facial fracture and neurosurgery.  The hematoma was quite small but with the associated fractures I think  an intermediate approach is appropriate.  Patient will be admitted for observation in the ICU with acute 2-hour neurochecks.  If there are clinical changes repeat CT of the head will be done patient is not on anticoagulants.  This will allow time for facial fractures to evaluate
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
## 2024-03-05 NOTE — ED NOTES
Med Rec complete per patient's spouse with med list   Allergies reviewed  Antibiotics in the past 30 days:no  Anticoagulant in past 14 days:no  Pharmacy patient utilizes:[facility] on [address]    Per spouse pt received a Cortisone shot on her finger 10 days ago at [facility] (Dr. Burnham)    Per spouse unable to recall when she last received Prolia injection. States pt has a few months left until next dose

## 2024-03-05 NOTE — THERAPY
"Occupational Therapy   Initial Evaluation     Patient Name: Michelle Moon  Age:  78 y.o., Sex:  female  Medical Record #: [id number]  Today's Date: 3/5/2024     Precautions  Precautions: Fall Risk    Assessment  Patient is 78 y.o. female admitted after GLF with head strike. Hx of dementia. She sustained facial fx and L temporal hematoma.  Additional factors influencing patient status / progress: weakness, fatigue, impaired balance, impaired cognition.      FYI, pt did not know she was going to be going to a group home this Thursday; family hadn't told her yet.     Plan    Occupational Therapy Initial Treatment Plan   Treatment Interventions: Self Care / Activities of Daily Living, Adaptive Equipment, Neuro Re-Education / Balance, Cognitive Skill Development, Therapeutic Exercises, Therapeutic Activity  Treatment Frequency: 3 Times per Week  Duration: Until Therapy Goals Met    DC Equipment Recommendations: Unable to determine at this time  Discharge Recommendations: Recommend post-acute placement for additional occupational therapy services prior to discharge home (likely memory care unit w/ support for ADLs/functional mobility)     Subjective    \"I already went to the bathroom at the doctor's office at 10am.\" (It was 9am during evaluation)     Objective       03/05/24 0912   Prior Living Situation   Prior Services Intermittent Physical Support for ADL Per Family   Equipment Owned Front-Wheel Walker   Lives with - Patient's Self Care Capacity Spouse   Comments Family was planning on having patient go into a Memory care unit at [facility] in [place] on Thursday due to her dementia. They hadn't informed her of this yet. They said she was able to dress, toilet, groom, and feed herself independently but needed supervision for bathing. She was supposed to be using a walker but wasn't.   Prior Level of ADL Function   Self Feeding Independent   Grooming / Hygiene Independent   Bathing Requires Assist   Dressing Independent "   Toileting Independent   Prior Level of IADL Function   Prior Level Of Mobility Independent Without Device in Community;Independent Without Device in Home   History of Falls   History of Falls Yes   Precautions   Precautions Fall Risk   Pain 0 - 10 Group   Therapist Pain Assessment Nurse Notified;0   Cognition    Cognition / Consciousness X   Level of Consciousness Alert   Comments pleasant and cooperative; poor new learning and impaired memory. hx of dementia. family reports she is a bit worse than baseline during this admit.   Active ROM Upper Body   Active ROM Upper Body  WDL   Strength Upper Body   Upper Body Strength  WDL   Balance Assessment   Sitting Balance (Static) Fair   Sitting Balance (Dynamic) Fair   Standing Balance (Static) Poor +   Standing Balance (Dynamic) Poor   Weight Shift Sitting Fair   Weight Shift Standing Poor   Comments w/ FWW; poor walker safety   Bed Mobility    Comments in chair pre/post   ADL Assessment   Grooming Standing;Minimal Assist  (wash hands)   Lower Body Dressing Minimal Assist  (don underwear, able to thread, assist for pulling up over hips and balance)   Toileting Minimal Assist   Comments most limited by impaired balance during ADL routine   How much help from another person does the patient currently need...   Putting on and taking off regular lower body clothing? 3   Bathing (including washing, rinsing, and drying)? 3   Toileting, which includes using a toilet, bedpan, or urinal? 3   Putting on and taking off regular upper body clothing? 3   Taking care of personal grooming such as brushing teeth? 3   Eating meals? 3   6 Clicks Daily Activity Score 18   Functional Mobility   Sit to Stand Minimal Assist   Bed, Chair, Wheelchair Transfer Minimal Assist   Toilet Transfers Minimal Assist   Mobility chair>BR>Chair   Comments w/ FWW   Patient / Family Goals   Patient / Family Goal #1 go home   Short Term Goals   Short Term Goal # 1 pt will demo ADL txfs w/ supv   Short Term  Goal # 2 pt will groom at the sink w/ supv   Short Term Goal # 3 pt will dress LB w/ supv

## 2024-03-05 NOTE — ASSESSMENT & PLAN NOTE
VTE prophylaxis initially contraindicated secondary to elevated bleeding risk.  3/6 Trauma surveillance venous duplex ultrasonography ordered.

## 2024-03-05 NOTE — ED TRIAGE NOTES
Chief Complaint   Patient presents with    GLF     Pt BIBA from home. Pt was walking upstairs when she lost her balance and fell backwards hitting the back L side of her head. Pt A+Ox1, per spouse has hx of dementia and this is her baseline. Per  pt does have history of falls and should be using walker, but refuses to.  is placing pt in a group home on Thursday this week.          Pt activated as a TBI for above complaint. Pt taken to scanner on arrival.  at bedside.

## 2024-03-05 NOTE — PROGRESS NOTES
4 Eyes Skin Assessment Completed by DWAYNE Espino and DWAYNE Obrien.    Head Scab, Bruising, Swelling, Redness, and Edema  Ears Redness  Nose WDL  Mouth WDL  Neck WDL  Breast/Chest WDL  Shoulder Blades WDL  Spine WDL  (R) Arm/Elbow/Hand WDL  (L) Arm/Elbow/Hand WDL  Abdomen WDL  Groin WDL  Scrotum/Coccyx/Buttocks Redness and Blanching  (R) Leg WDL  (L) Leg WDL  (R) Heel/Foot/Toe WDL  (L) Heel/Foot/Toe WDL          Devices In Places SCD's      Interventions In Place Sacral Mepilex    Possible Skin Injury No    Pictures Uploaded Into Epic N/A  Wound Consult Placed N/A  RN Wound Prevention Protocol Ordered No

## 2024-03-05 NOTE — PROGRESS NOTES
Trauma / Surgical Daily Progress Note    Date of Service  3/5/2024    Chief Complaint  78 y.o. female admitted 3/4/2024 with left subdural hematoma, left temporal skull fracture, & facial fractures after sustaining a fall down steps.    Interval Events  Admit to TICU yesterday.   Tertiary exam completed, no additional injuries noted on exam.  Imaging reviewed by Dr. Ruffin who reports SDH is classified as BIG1.  Neurologically at baseline with GCS 14.  Facial surgery consults pending.   Geriatric medicine consulted this AM, formal consult to follow.    Review of Systems  Review of Systems   Constitutional:  Negative for chills, diaphoresis, fever and malaise/fatigue.   HENT:  Negative for ear discharge, ear pain and tinnitus.    Eyes:  Negative for blurred vision, double vision, photophobia and pain.   Respiratory:  Negative for shortness of breath.    Cardiovascular:  Negative for chest pain and palpitations.   Gastrointestinal:  Negative for abdominal pain, nausea and vomiting.   Genitourinary: Negative.         Voiding   Musculoskeletal:  Negative for back pain, joint pain, myalgias and neck pain.   Skin: Negative.    Neurological:  Positive for headaches (mild & without progression). Negative for dizziness, tingling, tremors, sensory change, speech change, focal weakness and weakness.        Vital Signs  Temp:  [35.9 °C (96.6 °F)-36.7 °C (98 °F)] 36.7 °C (98 °F)  Pulse:  [63-94] 69  Resp:  [10-50] 18  BP: ()/(57-98) 105/69  SpO2:  [89 %-99 %] 93 %    Physical Exam  Physical Exam  Constitutional:       General: She is not in acute distress.     Appearance: She is not ill-appearing.   HENT:      Head: Normocephalic.      Comments: Left facial & forehead ecchymosis     Right Ear: External ear normal.      Left Ear: External ear normal.      Ears:      Comments: Scant dried sanguinous drainage in left ear canal, no acute drainage noted.     Mouth/Throat:      Mouth: Mucous membranes are moist.       Pharynx: Oropharynx is clear.   Eyes:      Extraocular Movements: Extraocular movements intact.      Pupils: Pupils are equal, round, and reactive to light.      Comments: Left periorbital ecchymosis   Cardiovascular:      Rate and Rhythm: Normal rate and regular rhythm.      Heart sounds: Normal heart sounds.   Pulmonary:      Effort: Pulmonary effort is normal. No respiratory distress.      Breath sounds: Normal breath sounds.   Chest:      Chest wall: No tenderness.   Abdominal:      General: Bowel sounds are normal. There is no distension.      Palpations: Abdomen is soft.      Tenderness: There is no abdominal tenderness. There is no guarding.   Musculoskeletal:         General: No swelling, tenderness, deformity or signs of injury.      Cervical back: Normal range of motion and neck supple. No tenderness.      Right lower leg: No edema.      Left lower leg: No edema.   Skin:     General: Skin is warm and dry.      Capillary Refill: Capillary refill takes less than 2 seconds.   Neurological:      Mental Status: She is alert. Mental status is at baseline. She is disoriented.      GCS: GCS eye subscore is 4. GCS verbal subscore is 4. GCS motor subscore is 6.      Cranial Nerves: No facial asymmetry.      Motor: No seizure activity.   Psychiatric:         Behavior: Behavior normal. Behavior is cooperative.       Laboratory  Recent Results (from the past 24 hour(s))   CBC WITH DIFFERENTIAL    Collection Time: 03/04/24  4:30 PM   Result Value Ref Range    WBC 10.2 4.8 - 10.8 K/uL    RBC 5.12 4.20 - 5.40 M/uL    Hemoglobin 14.8 12.0 - 16.0 g/dL    Hematocrit 43.8 37.0 - 47.0 %    MCV 85.5 81.4 - 97.8 fL    MCH 28.9 27.0 - 33.0 pg    MCHC 33.8 32.2 - 35.5 g/dL    RDW 42.5 35.9 - 50.0 fL    Platelet Count 246 164 - 446 K/uL    MPV 10.5 9.0 - 12.9 fL    Neutrophils-Polys 66.30 44.00 - 72.00 %    Lymphocytes 24.20 22.00 - 41.00 %    Monocytes 8.40 0.00 - 13.40 %    Eosinophils 0.40 0.00 - 6.90 %    Basophils 0.30 0.00 -  1.80 %    Immature Granulocytes 0.40 0.00 - 0.90 %    Nucleated RBC 0.00 0.00 - 0.20 /100 WBC    Neutrophils (Absolute) 6.74 1.82 - 7.42 K/uL    Lymphs (Absolute) 2.46 1.00 - 4.80 K/uL    Monos (Absolute) 0.85 0.00 - 0.85 K/uL    Eos (Absolute) 0.04 0.00 - 0.51 K/uL    Baso (Absolute) 0.03 0.00 - 0.12 K/uL    Immature Granulocytes (abs) 0.04 0.00 - 0.11 K/uL    NRBC (Absolute) 0.00 K/uL   COMP METABOLIC PANEL    Collection Time: 24  4:30 PM   Result Value Ref Range    Sodium 139 135 - 145 mmol/L    Potassium 3.4 (L) 3.6 - 5.5 mmol/L    Chloride 103 96 - 112 mmol/L    Co2 20 20 - 33 mmol/L    Anion Gap 16.0 7.0 - 16.0    Glucose 154 (H) 65 - 99 mg/dL    Bun 13 8 - 22 mg/dL    Creatinine 0.52 0.50 - 1.40 mg/dL    Calcium 9.3 8.5 - 10.5 mg/dL    Correct Calcium 9.2 8.5 - 10.5 mg/dL    AST(SGOT) 20 12 - 45 U/L    ALT(SGPT) 13 2 - 50 U/L    Alkaline Phosphatase 71 30 - 99 U/L    Total Bilirubin 0.5 0.1 - 1.5 mg/dL    Albumin 4.1 3.2 - 4.9 g/dL    Total Protein 7.0 6.0 - 8.2 g/dL    Globulin 2.9 1.9 - 3.5 g/dL    A-G Ratio 1.4 g/dL   APTT    Collection Time: 24  4:30 PM   Result Value Ref Range    APTT 27.1 24.7 - 36.0 sec   PROTHROMBIN TIME (INR)    Collection Time: 24  4:30 PM   Result Value Ref Range    PT 13.7 12.0 - 14.6 sec    INR 1.04 0.87 - 1.13   ESTIMATED GFR    Collection Time: 24  4:30 PM   Result Value Ref Range    GFR (CKD-EPI) 96 >60 mL/min/1.73 m 2   COD (ADULT)    Collection Time: 24  4:33 PM   Result Value Ref Range    ABO Grouping Only O     Rh Grouping Only POS     Antibody Screen-Cod NEG    EKG (Now)    Collection Time: 24  4:51 PM   Result Value Ref Range    Report       [facility] Emergency Dept.    Test Date:  2024  Pt Name:    GENEVA NASH               Department: ER  MRN:        [id number]                      Room:       [facility]  Gender:     Female                       Technician: [id number]  :        [id number]                    Requested By:JOSEPHINE SAUCEDA  Order #:    [id number]                    Reading MD: Josephine Sauceda    Measurements  Intervals                                Axis  Rate:       67                           P:          56  MA:         219                          QRS:        59  QRSD:       70                           T:          0  QT:         420  QTc:        444    Interpretive Statements  Sinus rhythm at a rate of 67, normal axis, normal intervals, no ST elevation,  1 mm ST depression in V2 and V3 with diffuse T wave flattening.  No  significant change from prior EKG.  Electronically Signed On 03- 17:08:39 PST by Josephine Sauceda     PLATELET MAPPING WITH BASIC TEG    Collection Time: 03/04/24  4:58 PM   Result Value Ref Range    Reaction Time Initial-R 5.2 4.6 - 9.1 min    React Time Initial Hep 5.3 4.3 - 8.3 min    Clot Kinetics-K 1.3 0.8 - 2.1 min    Clot Angle-Angle 73.0 63.0 - 78.0 degrees    Maximum Clot Strength-MA 59.0 52.0 - 69.0 mm    TEG Functional Fibrinogen(MA) 19.9 15.0 - 32.0 mm    Lysis 30 minutes-LY30 3.1 (H) 0.0 - 2.6 %    % Inhibition ADP 0.0 0.0 - 17.0 %    % Inhibition AA 0.0 0.0 - 11.0 %    TEG Algorithm Link Algorithm        Fluids    Intake/Output Summary (Last 24 hours) at 3/5/2024 0955  Last data filed at 3/5/2024 0908  Gross per 24 hour   Intake 597.46 ml   Output 0 ml   Net 597.46 ml       Core Measures & Quality Metrics  Labs reviewed, Radiology images reviewed and Medications reviewed  Miguel catheter: No Miguel      DVT Prophylaxis: Contraindicated - High bleeding risk  DVT prophylaxis - mechanical: SCDs  Ulcer prophylaxis: Not indicated        RAP Score Total: 7    CAGE Results: not completed Blood Alcohol>0.08: not completed     Assessment/Plan  * SDH (subdural hematoma) (HCC)- (present on admission)  Assessment & Plan  4.1 mm associated with temporal skull fracture on the left, small amount of associated pneumocephalus.  BIG 1.  Neuro checks.  Speech Language Pathology  cognitive evaluation.  David Ruffin MD. Neurosurgeon. [facility].    Multiple facial fractures, closed, initial encounter (HCC)- (present on admission)  Assessment & Plan  Left periorbital extending into squamous portion of the temporal bone, minimally displaced zygomatic fracture, small periorbital hematoma  Normal pupillary reflexes and full extraocular movement without entrapment  Definitive plan pending.  Daryn Rajan DDS, MD. [facility].    Encounter for geriatric assessment- (present on admission)  Assessment & Plan  3/5 Geriatric medicine consulted.   Moncho Esparza MD, Geriatric Medicine.    Contraindication to deep vein thrombosis (DVT) prophylaxis- (present on admission)  Assessment & Plan  VTE prophylaxis initially contraindicated secondary to elevated bleeding risk.  3/6 Trauma surveillance venous duplex ultrasonography ordered.    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  Resumed maintenance medication on admission.    Hypertension- (present on admission)  Assessment & Plan  Chronic condition treated with lisinopril.  Resumed maintenance medication on admission.    Trauma- (present on admission)  Assessment & Plan  Fall down steps.  Trauma Green Activation.  Kamran Muñoz MD. Trauma Surgery.      Mental status adequate for full examination?: Yes    Spine cleared (radiologically and/or clinically): Yes    All current laboratory studies/radiology exams reviewed: Yes    Medications reconciliation has been reviewed: Yes - further home med resumption per geriatric medicine.    Completed Consultations:  None     Pending Consultations:  Dr. Daryn Rajan MD, Facial Surgery  Dr. Moncho Esparza MD, Geriatric Medicine    Newly identified diagnoses, injuries and/or  co-morbidities:  None noted at time of exam.    Discussed patient condition with RN, Pharmacy, Charge nurse / hot rounds, Patient, and trauma surgery, Dr. Rosenbaum.

## 2024-03-05 NOTE — PROGRESS NOTES
1922 Pt arrived to [facility] T930     Vitals:   HR: 106  BP: 141/98  RR: 14  SaO2: 92% on RA  Wt: 56.0 kg  Temp 97.1F temporal   _____________________________________________________________     4 Eyes Skin Assessment Completed by DWAYNE Greco and DWAYNE Poon.    Head Scratch to forehead, bruising to left eye with abrasion  Ears bleeding to left ear  Nose WDL  Mouth WDL  Neck WDL  Breast/Chest WDL  Shoulder Blades WDL  Spine WDL  (R) Arm/Elbow/Hand WDL with PIV  (L) Arm/Elbow/Hand WDL with PIV  Abdomen WDL  Groin Redness and Blanching slow to estrada  Scrotum/Coccyx/Buttocks Redness and Blanching slowly  (R) Leg WDL  (L) Leg WDL  (R) Heel/Foot/Toe WDL  (L) Heel/Foot/Toe WDL          Devices In Places ECG, Tele Box, Blood Pressure Cuff, and Pulse Ox      Interventions In Place Sacral Mepilex, TAP System, Pillows, Q2 Turns, Low Air Loss Mattress, and Heels Loaded W/Pillows    Possible Skin Injury No    Pictures Uploaded Into Epic N/A  Wound Consult Placed N/A  RN Wound Prevention Protocol Ordered Yes    ______________________________________________________________     Personal belongings:   Belonging sent home with family

## 2024-03-05 NOTE — ED NOTES
Pt upgraded from a TBI to a trauma green at 17:01 after results were positive for bleed on CT scan. ERP Breslow at bedside, second line place. Pt A+Ox1 which is patients baseline per  at bedside. ERP Breslow to contact trauma services.      17:08- 1,500 mg of Keppra given

## 2024-03-05 NOTE — ASSESSMENT & PLAN NOTE
4.1 mm associated with temporal skull fracture on the left, small amount of associated pneumocephalus.  BIG 1.  Neuro checks.  Speech Language Pathology cognitive recommending inpatient post acute.   David Ruffin MD. Neurosurgeon. [facility].

## 2024-03-05 NOTE — CONSULTS
HPI: Isabell Moon is a 79 y/o female with a PMHx of dementia, s/p fall from standing with intracranial hemorrhage and facial fractures. Admitted for q2 neuro checks and OMFS consulted for facial fracture recommendations.     PMHx: Dementia, HTN, HLD, Stomach ulcers  PSHx:    ORIF, ANKLE Right 1/22/2019     Procedure: ANKLE ORIF;  Surgeon: Rajan Griffin M.D.;  Location: SURGERY [facility];  Service: Orthopedics    COLONOSCOPY - ENDO N/A 6/18/2018     Procedure: COLONOSCOPY - ENDO;  Surgeon: Carlos Rudolph M.D.;  Location: ENDOSCOPY [facility];  Service: Gastroenterology    GASTROSCOPY-ENDO   6/16/2018     Procedure: GASTROSCOPY-ENDO;  Surgeon: Braden Monge M.D.;  Location: ENDOSCOPY [facility];  Service: Gastroenterology    IRRIGATION & DEBRIDEMENT ORTHO Right 7/23/2015     Procedure: IRRIGATION & DEBRIDEMENT ORTHO KNEE ;  Surgeon: Sameer Kwan M.D.;  Location: SURGERY [facility];  Service:     ORIF, PATELLA   7/23/2015     Procedure: PATELLA ORIF REVISION;  Surgeon: Sameer Kwan M.D.;  Location: SURGERY [facility];  Service:     IRRIGATION & DEBRIDEMENT ORTHO Right 7/21/2015     Procedure: IRRIGATION & DEBRIDEMENT ORTHO;  Surgeon: Sameer Kwan M.D.;  Location: SURGERY [facility];  Service:     HARDWARE REMOVAL ORTHO   7/21/2015     Procedure: HARDWARE REMOVAL PATELLA;  Surgeon: Sameer Kwan M.D.;  Location: SURGERY [facility];  Service:     SEPTOPLASTY   10/7/2010     Performed by TRISTON SYLVESTER at SURGERY SAME DAY [facility]    SEPTOPLASTY   4/26/2010     Performed by TRISTON SYLVESTER at SURGERY SAME DAY [facility]    TURBINATE REDUCTION   4/26/2010     Performed by TRISTON SYLVESTER at SURGERY SAME DAY [facility]    HYSTERECTOMY LAPAROSCOPY   1994    [place] BREAST REDUCTION     Meds:   Current Outpatient Medications   Medication Instructions    denosumab (PROLIA) 60 mg, Subcutaneous, ONCE, Every 6 months     FLUoxetine (PROZAC) 20 mg,  Oral, DAILY    Gemtesa 75 mg, Oral, EVERY EVENING    hydrOXYzine HCl (ATARAX) 25 mg, Oral, 4 TIMES DAILY PRN    lisinopril (PRINIVIL) 20 mg, Oral, DAILY    Multiple Vitamins-Minerals (MULTIVITAMIN ADULTS) Tab 1 Tablet, Oral, EVERY EVENING    omeprazole (PRILOSEC) 20 mg, Oral, DAILY    QUEtiapine (SEROQUEL) 25 mg, Oral, EVERY BEDTIME    rosuvastatin (CRESTOR) 20 mg, Oral, EVERY BEDTIME    Vitamin D-3 Super Strength 2,000 Units, Oral, Every Day (QD)    ALL: NKDA  Soc Hx: +etoh    ROS: Patient denies diplopia, dysphagia, dsypnea, altered occlusion.     Vitals:    03/04/24 2000   BP: 138/84   Pulse: 67   Resp: 16   Temp: 36 °C (96.8 °F)   SpO2: 97%     PE:   Gen: Awake and alert, not oriented  HEENT: left side periorbital ecchymosis and edema. EOMI B, Nares patent, PERRL, occlusion stable and reproducible.   Neuro: CN II-XII intact    Recent Labs     03/04/24  1630   WBC 10.2   RBC 5.12   HEMOGLOBIN 14.8   HEMATOCRIT 43.8   MCV 85.5   MCH 28.9   RDW 42.5   PLATELETCT 246   MPV 10.5   NEUTSPOLYS 66.30   LYMPHOCYTES 24.20   MONOCYTES 8.40   EOSINOPHILS 0.40   BASOPHILS 0.30      Imaging:   CT face:   IMPRESSION:     1.  Fractures of the LEFT zygomatic arch, LEFT greater sphenoid wing LEFT lateral orbital wall, squamous LEFT temporal bone and longitudinal fracture through the LEFT temporal bone with fluid in the LEFT middle ear  2.  4 mm LEFT temporal extra-axial hematoma with scant gas internal could be subdural or epidural  3.  LEFT lateral orbital extraconal hematoma with gas with mild LEFT proptosis  4.  Intravenous gas in the soft tissues and BILATERAL cavernous sinus    Assessment: Patient is a 78 y /o female s/p fall from standing, now with a left ZMC fracture    Plan :  -No surgical intervention necessary for facial fractures  -Patient may follow up with [facility] one week after discharge  -No nose blowing or straws x 2 weeks  -Call with questions or concerns    Osei Rajna DDS, MD,  FACS  [phone]

## 2024-03-06 ENCOUNTER — APPOINTMENT (OUTPATIENT)
Dept: RADIOLOGY | Facility: MEDICAL CENTER | Age: 79
DRG: 086 | End: 2024-03-06
Payer: MEDICARE

## 2024-03-06 ENCOUNTER — PATIENT OUTREACH (OUTPATIENT)
Dept: SCHEDULING | Facility: IMAGING CENTER | Age: 79
End: 2024-03-06
Payer: MEDICARE

## 2024-03-06 VITALS
TEMPERATURE: 97.7 F | HEIGHT: 68 IN | OXYGEN SATURATION: 96 % | SYSTOLIC BLOOD PRESSURE: 116 MMHG | DIASTOLIC BLOOD PRESSURE: 81 MMHG | WEIGHT: 147 LBS | HEART RATE: 55 BPM | RESPIRATION RATE: 16 BRPM | BODY MASS INDEX: 22.28 KG/M2

## 2024-03-06 LAB
ANION GAP SERPL CALC-SCNC: 8 MMOL/L (ref 7–16)
BASOPHILS # BLD AUTO: 0.3 % (ref 0–1.8)
BASOPHILS # BLD: 0.02 K/UL (ref 0–0.12)
BUN SERPL-MCNC: 10 MG/DL (ref 8–22)
CALCIUM SERPL-MCNC: 8.7 MG/DL (ref 8.5–10.5)
CHLORIDE SERPL-SCNC: 104 MMOL/L (ref 96–112)
CO2 SERPL-SCNC: 26 MMOL/L (ref 20–33)
CREAT SERPL-MCNC: 0.44 MG/DL (ref 0.5–1.4)
EOSINOPHIL # BLD AUTO: 0.01 K/UL (ref 0–0.51)
EOSINOPHIL NFR BLD: 0.1 % (ref 0–6.9)
ERYTHROCYTE [DISTWIDTH] IN BLOOD BY AUTOMATED COUNT: 42.6 FL (ref 35.9–50)
GFR SERPLBLD CREATININE-BSD FMLA CKD-EPI: 100 ML/MIN/1.73 M 2
GLUCOSE SERPL-MCNC: 100 MG/DL (ref 65–99)
HCT VFR BLD AUTO: 38.3 % (ref 37–47)
HGB BLD-MCNC: 13 G/DL (ref 12–16)
IMM GRANULOCYTES # BLD AUTO: 0.04 K/UL (ref 0–0.11)
IMM GRANULOCYTES NFR BLD AUTO: 0.5 % (ref 0–0.9)
LYMPHOCYTES # BLD AUTO: 1.86 K/UL (ref 1–4.8)
LYMPHOCYTES NFR BLD: 24.3 % (ref 22–41)
MAGNESIUM SERPL-MCNC: 2 MG/DL (ref 1.5–2.5)
MCH RBC QN AUTO: 29.2 PG (ref 27–33)
MCHC RBC AUTO-ENTMCNC: 33.9 G/DL (ref 32.2–35.5)
MCV RBC AUTO: 86.1 FL (ref 81.4–97.8)
MONOCYTES # BLD AUTO: 0.62 K/UL (ref 0–0.85)
MONOCYTES NFR BLD AUTO: 8.1 % (ref 0–13.4)
NEUTROPHILS # BLD AUTO: 5.1 K/UL (ref 1.82–7.42)
NEUTROPHILS NFR BLD: 66.7 % (ref 44–72)
NRBC # BLD AUTO: 0 K/UL
NRBC BLD-RTO: 0 /100 WBC (ref 0–0.2)
PLATELET # BLD AUTO: 193 K/UL (ref 164–446)
PMV BLD AUTO: 10.4 FL (ref 9–12.9)
POTASSIUM SERPL-SCNC: 3.6 MMOL/L (ref 3.6–5.5)
RBC # BLD AUTO: 4.45 M/UL (ref 4.2–5.4)
SODIUM SERPL-SCNC: 138 MMOL/L (ref 135–145)
VIT B12 SERPL-MCNC: 1027 PG/ML (ref 211–911)
WBC # BLD AUTO: 7.7 K/UL (ref 4.8–10.8)

## 2024-03-06 PROCEDURE — 83735 ASSAY OF MAGNESIUM: CPT

## 2024-03-06 PROCEDURE — A9270 NON-COVERED ITEM OR SERVICE: HCPCS | Performed by: SURGERY

## 2024-03-06 PROCEDURE — 93970 EXTREMITY STUDY: CPT

## 2024-03-06 PROCEDURE — 36415 COLL VENOUS BLD VENIPUNCTURE: CPT

## 2024-03-06 PROCEDURE — 700102 HCHG RX REV CODE 250 W/ 637 OVERRIDE(OP): Performed by: SURGERY

## 2024-03-06 PROCEDURE — 97161 PT EVAL LOW COMPLEX 20 MIN: CPT

## 2024-03-06 PROCEDURE — 85025 COMPLETE CBC W/AUTO DIFF WBC: CPT

## 2024-03-06 PROCEDURE — 99232 SBSQ HOSP IP/OBS MODERATE 35: CPT | Performed by: INTERNAL MEDICINE

## 2024-03-06 PROCEDURE — 82652 VIT D 1 25-DIHYDROXY: CPT

## 2024-03-06 PROCEDURE — 700111 HCHG RX REV CODE 636 W/ 250 OVERRIDE (IP): Mod: JZ

## 2024-03-06 PROCEDURE — 80048 BASIC METABOLIC PNL TOTAL CA: CPT

## 2024-03-06 PROCEDURE — 99239 HOSP IP/OBS DSCHRG MGMT >30: CPT | Performed by: NURSE PRACTITIONER

## 2024-03-06 PROCEDURE — 82607 VITAMIN B-12: CPT

## 2024-03-06 RX ORDER — ACETAMINOPHEN 325 MG/1
325 TABLET ORAL EVERY 4 HOURS PRN
COMMUNITY
Start: 2024-03-06

## 2024-03-06 RX ORDER — QUETIAPINE FUMARATE 25 MG/1
50 TABLET, FILM COATED ORAL
Qty: 60 TABLET | Refills: 3 | Status: SHIPPED | OUTPATIENT
Start: 2024-03-06

## 2024-03-06 RX ORDER — QUETIAPINE FUMARATE 25 MG/1
50 TABLET, FILM COATED ORAL NIGHTLY
Status: DISCONTINUED | OUTPATIENT
Start: 2024-03-06 | End: 2024-03-06 | Stop reason: HOSPADM

## 2024-03-06 RX ADMIN — ACETAMINOPHEN 650 MG: 325 TABLET, FILM COATED ORAL at 06:00

## 2024-03-06 RX ADMIN — HALOPERIDOL LACTATE 2.5 MG: 5 INJECTION, SOLUTION INTRAMUSCULAR at 06:00

## 2024-03-06 RX ADMIN — POLYETHYLENE GLYCOL 3350 1 PACKET: 17 POWDER, FOR SOLUTION ORAL at 06:01

## 2024-03-06 RX ADMIN — LISINOPRIL 20 MG: 20 TABLET ORAL at 06:00

## 2024-03-06 RX ADMIN — FLUOXETINE HYDROCHLORIDE 20 MG: 20 CAPSULE ORAL at 06:00

## 2024-03-06 RX ADMIN — ACETAMINOPHEN 650 MG: 325 TABLET, FILM COATED ORAL at 12:20

## 2024-03-06 RX ADMIN — DOCUSATE SODIUM 100 MG: 100 CAPSULE, LIQUID FILLED ORAL at 06:00

## 2024-03-06 RX ADMIN — OMEPRAZOLE 20 MG: 20 CAPSULE, DELAYED RELEASE ORAL at 06:00

## 2024-03-06 ASSESSMENT — COGNITIVE AND FUNCTIONAL STATUS - GENERAL
SUGGESTED CMS G CODE MODIFIER MOBILITY: CK
MOVING FROM LYING ON BACK TO SITTING ON SIDE OF FLAT BED: A LITTLE
STANDING UP FROM CHAIR USING ARMS: A LITTLE
CLIMB 3 TO 5 STEPS WITH RAILING: A LITTLE
MOVING TO AND FROM BED TO CHAIR: A LITTLE
WALKING IN HOSPITAL ROOM: A LITTLE
MOBILITY SCORE: 19

## 2024-03-06 ASSESSMENT — ENCOUNTER SYMPTOMS
SHORTNESS OF BREATH: 0
FOCAL WEAKNESS: 0
PHOTOPHOBIA: 0
MYALGIAS: 0
FEVER: 0
NAUSEA: 0
WEAKNESS: 0
CHILLS: 0
SENSORY CHANGE: 0
TREMORS: 0
VOMITING: 0
HEADACHES: 0
DOUBLE VISION: 0
DIAPHORESIS: 0
EYE PAIN: 0
BLURRED VISION: 0
NECK PAIN: 0
ABDOMINAL PAIN: 0
TINGLING: 0
DIZZINESS: 0
BACK PAIN: 0
PALPITATIONS: 0
SPEECH CHANGE: 0

## 2024-03-06 ASSESSMENT — GAIT ASSESSMENTS
ASSISTIVE DEVICE: FRONT WHEEL WALKER
DEVIATION: DECREASED BASE OF SUPPORT;BRADYKINETIC;OTHER (COMMENT)
GAIT LEVEL OF ASSIST: CONTACT GUARD ASSIST
DISTANCE (FEET): 40

## 2024-03-06 ASSESSMENT — PAIN DESCRIPTION - PAIN TYPE: TYPE: ACUTE PAIN

## 2024-03-06 NOTE — DISCHARGE PLANNING
Case Management Discharge Planning    Admission Date: 3/4/2024  GMLOS: 3  ALOS: 2    6-Clicks ADL Score: 16  6-Clicks Mobility Score: 18  PT and/or OT Eval ordered: Yes  Post-acute Referrals Ordered: Yes  Post-acute Choice Obtained: No  Has referral(s) been sent to post-acute provider:  No      Anticipated Discharge Dispo: Discharge Disposition: D/T to home under [facility] care in anticipation of covered skilled care (06)  Discharge Address: [address] [place], [place] [address] [facility] [address]  Discharge Contact Phone Number: [phone]    DME Needed: No    Action(s) Taken: DC Assessment Complete (See below)  RN CM called and left VM to [facility] ([facility]) [phone] for care coordination, waiting for call back.    1200- DWAYNE OSMAN spoke to Rosy [facility] ([facility]) and was informed that Pt is discharging today and that transport will be set up around 1400. Rosy asked for a prescription or meds to beds. DWAYNE OSMAN spoke to MIKE Gutiérrez, per Howard Pt will have no new medication upon discharge and that she can continue taking her home meds. Rosy from [facility] ([facility]) updated.     RN CM requested for Transport at 1400 through [facility], waiting for confirmation.    1216-Transport time is confirmed at 1400 via [facility]. Rosy [facility] ([facility]) informed.     Pt has been accepted by Leonie [facility]    Escalations Completed: None    Medically Clear: Yes    Next Steps: f/u with pt and medical team to discuss dc needs and barriers.      Barriers to Discharge: Medical clearance and Outpatient referrals pending    Is the patient up for discharge tomorrow: No      Care Transition Team Assessment    RN CM met with Pt and Pt's daughter at bedside with Pt's spouse on the phone to obtain assessment. Pt is oriented to self, pt's daughter Jayne answered all questions and verified the information on the face sheet. On baseline, Pt lives with her  in a one story house in [facility].  Pt has a cane and wheelchair at home but refuses to use it. Per Jayne, their dc plan is for Pt to go to [facility] that they already arranged prior Pt had a fall. Pt's  and Jayne is agreeable with HH. HH choice obtained, signed by Pt's daughter. Faxed to [facility] for processing.  PCP is Dr. Del Angel.      Information Source  Orientation Level: Oriented to person, Disoriented to place, Disoriented to time, Disoriented to situation  Information Given By: Other (Comments) (daughter)  Informant's Name: Jayne  Who is responsible for making decisions for patient? : Spouse  Name(s) of Primary Decision Maker: Roland Moon    Readmission Evaluation  Is this a readmission?: No    Elopement Risk  Legal Hold: No  Ambulatory or Self Mobile in Wheelchair: No-Not an Elopement Risk  Elopement Risk: Not at Risk for Elopement    Interdisciplinary Discharge Planning  Lives with - Patient's Self Care Capacity: Significant Other  Patient or legal guardian wants to designate a caregiver: Yes  Caregiver name: Jayne  Support Systems: Children, Spouse / Significant Other, Family Member(s)  Housing / Facility: [address] [facility]  Prior Services: Intermittent Physical Support for ADL Per Family  Durable Medical Equipment: Walker    Discharge Preparedness  What is your plan after discharge?: Other (comment) (Home- Senior Living)  What are your discharge supports?: Spouse, Child  Prior Functional Level: Ambulatory, Needs Assist with Activities of Daily Living, Needs Assist with Medication Management  Difficulity with ADLs: None  Difficulity with IADLs: Driving, Keeping track of finances, Managing medication, Using the telephone or computer  Difficulity with IADL Comments: Due to dementia    Functional Assesment  Prior Functional Level: Ambulatory, Needs Assist with Activities of Daily Living, Needs Assist with Medication Management    Finances  Financial Barriers to Discharge: No  Prescription Coverage: Yes    Vision / Hearing  Impairment  Hearing Impairment : No         Advance Directive  Advance Directive?: None    Domestic Abuse  Have you ever been the victim of abuse or violence?: No  Physical Abuse or Sexual Abuse: No  Verbal Abuse or Emotional Abuse: No  Possible Abuse/Neglect Reported to:: Not Applicable    Psychological Assessment  History of Substance Abuse: None    Discharge Risks or Barriers  Discharge risks or barriers?: Complex medical needs  Patient risk factors: Complex medical needs    Anticipated Discharge Information  Discharge Disposition: D/T to home under [facility] care in anticipation of covered skilled care (06)  Discharge Address: [address] [place] [address] [place] [address]  Discharge Contact Phone Number: [phone]

## 2024-03-06 NOTE — ASSESSMENT & PLAN NOTE
Alert to herself only on her baseline  Likely moderate to advanced  Continue nonpharmacological treatment to avoid delirium  Continue Seroquel  Continue hydroxyzine for anxiety  Family planning to discharge the patient to the group home after discharge from here

## 2024-03-06 NOTE — THERAPY
"Speech Language Pathology   Cognitive Evaluation     Patient Name: Michelle Moon  AGE:  78 y.o., SEX:  female  Medical Record #: [id number]  Date of Service: 3/5/2024      History of Present Illness  77 y/o presented 3/4 after left subdural hematoma, left temporal skull fracture, & facial fractures after sustaining a fall down steps with no LOC.     CMHx: SDH, facial fractures, trauma, HTN, geriatric assessment, dementia  PMHx: Patella, septic arthritis of knee    CT Head 3/4:  \"1.  Cerebral atrophy.  2.  White matter lucencies most consistent with small vessel ischemic change versus demyelination or gliosis.  3. Sphenoid sinusitis.\"    General Information  Vitals  O2 Delivery Device: None - Room Air  Level of Consciousness: Alert, Awake  Patient Behaviors: Confused  Orientation: Self,   Follows Directives: Yes      Prior Living Situation & Level of Function  Prior Services: Intermittent Physical Support for ADL Per Family  Housing / Facility: [address] [facility]  Lives with - Patient's Self Care Capacity: Significant Other  Comments: Family was planning on having patient go into a Memory care unit at [facility] in [place] on Thursday due to her dementia. They hadn't informed her of this yet.  Communication: Dementia at baseline,  primarily completes IADLs, although she reportedly helps      Oral Mechanism Evaluation  Motor Speech: WFL - no indication of dysarthria or apraxia      Subjective  Pt agreeable and cooperative with SLP evaluation tasks.  at bedside very helpful in clinical interview.  reports that cognition is not changed from prior - \"In fact she did a little better than she sometimes does.\"      Communication Domain(s)  Expressive Language: WFL  Receptive Language: WFL  Cognitive-Linguistic: Moderate-Severe      Assessment  The patient was seen this date for a cognitive evaluation.      Cognistat  Orientation: Severe  Attention: Mild  Comprehension: Average  Repetition: Moderate  Naming: " Average  Memory: Severe  Calculations: Moderate  Similarities: Moderate  Judgement: Moderate      Medication Management  Answered temporal and numerical reasoning medication management tasks with 0% accuracy. Poor problem solving demonstrated when asked for a memory enhancing strategy.  reports he takes care of medication management at home.       Clock Drawing  Poor organization with clock drawing task. Clock drawn scored 1/13 indicating severe impairment per CLQT protocol. Errors as follows: 12 lines drawn, no numbers.       Clinical Impressions  Patient presents with moderate-severe cognitive deficits, which, per EMR and family report, are unchanged from baseline. With consideration that there is no acute change to patient's cognition, SLP will not follow during acute stay. However, patient will likely need assistance with IADLs after discharge from the hospital and would benefit from skilled SLP service for cognition in the [facility].       NOTE: It is not within the scope of practice of Speech-Language Pathologists to determine patient capacity. Please defer to the physician or psych to complete this assessment.       Recommendations  Supervision Needs Upons Discharge: Direct assistance with IADLs (see below)  IADLs: Financial management, Medication management, Appointment management, Household chores, Cooking         SLP Treatment Plan  Treatment Plan: None Indicated  SLP Frequency: N/A - Evaluation Only  Estimated Duration: N/A - Evaluation Only      Anticipated Discharge Needs  Discharge Recommendations: Recommend post-acute placement for additional speech therapy services prior to discharge home (likely memory care unit)  Therapy Recommendations Upon DC: Cognitive-Linguistic Training, Patient / Family / Caregiver Education, Tracheostomy Training      Rhianna Hays, SLP

## 2024-03-06 NOTE — PROGRESS NOTES
Trauma / Surgical Daily Progress Note    Date of Service  3/6/2024    Chief Complaint  78 y.o. female with a history of dementia admitted 3/4/2024 with left subdural hematoma, left temporal skull fracture, & facial fractures after sustaining a fall down steps.     Interval Events    Transferred to alex.   GCS 14. Neurologically baseline per family. PRN Haldol overnight.  Non-operative facial fractures.   Disposition: Discharge planning. Arrange home health.     Review of Systems  Review of Systems   Constitutional:  Negative for chills, diaphoresis, fever and malaise/fatigue.   HENT:  Negative for ear discharge, ear pain and tinnitus.    Eyes:  Negative for blurred vision, double vision, photophobia and pain.   Respiratory:  Negative for shortness of breath.    Cardiovascular:  Negative for chest pain and palpitations.   Gastrointestinal:  Negative for abdominal pain, nausea and vomiting.   Genitourinary: Negative.         Voiding   Musculoskeletal:  Negative for back pain, joint pain, myalgias and neck pain.   Skin: Negative.    Neurological:  Negative for dizziness, tingling, tremors, sensory change, speech change, focal weakness, weakness and headaches.        History of dementia.         Vital Signs  Temp:  [36.5 °C (97.7 °F)-36.7 °C (98 °F)] 36.5 °C (97.7 °F)  Pulse:  [58-78] 59  Resp:  [18-27] 18  BP: ()/(68-86) 139/86  SpO2:  [91 %-99 %] 94 %    Physical Exam  Physical Exam  Constitutional:       General: She is not in acute distress.     Appearance: She is not ill-appearing.   HENT:      Head: Normocephalic.      Comments: Left facial & forehead ecchymosis     Right Ear: External ear normal.      Left Ear: External ear normal.      Ears:      Comments: Scant dried sanguinous drainage in left ear canal, no acute drainage noted.     Mouth/Throat:      Mouth: Mucous membranes are moist.      Pharynx: Oropharynx is clear.   Eyes:      Extraocular Movements: Extraocular movements intact.      Pupils:  Pupils are equal, round, and reactive to light.      Comments: Left periorbital ecchymosis   Cardiovascular:      Rate and Rhythm: Normal rate and regular rhythm.      Heart sounds: Normal heart sounds.   Pulmonary:      Effort: Pulmonary effort is normal. No respiratory distress.      Breath sounds: Normal breath sounds.   Chest:      Chest wall: No tenderness.   Abdominal:      General: Bowel sounds are normal. There is no distension.      Palpations: Abdomen is soft.      Tenderness: There is no abdominal tenderness. There is no guarding.   Musculoskeletal:         General: No swelling, tenderness, deformity or signs of injury.      Cervical back: Normal range of motion and neck supple. No tenderness.      Right lower leg: No edema.      Left lower leg: No edema.   Skin:     General: Skin is warm and dry.      Capillary Refill: Capillary refill takes less than 2 seconds.   Neurological:      Mental Status: She is alert. Mental status is at baseline. She is disoriented.      GCS: GCS eye subscore is 4. GCS verbal subscore is 4. GCS motor subscore is 6.      Cranial Nerves: No facial asymmetry.      Motor: No seizure activity.   Psychiatric:         Behavior: Behavior normal. Behavior is cooperative.         Laboratory  No results found for this or any previous visit (from the past 24 hour(s)).    Fluids    Intake/Output Summary (Last 24 hours) at 3/6/2024 0729  Last data filed at 3/5/2024 0908  Gross per 24 hour   Intake 165.43 ml   Output --   Net 165.43 ml       Core Measures & Quality Metrics  Labs reviewed, Radiology images reviewed and Medications reviewed  Miguel catheter: No Miguel      DVT Prophylaxis: Contraindicated - High bleeding risk  DVT prophylaxis - mechanical: SCDs  Ulcer prophylaxis: Not indicated        RAP Score Total: 7    CAGE Results: not completed Blood Alcohol>0.08: not completed       Assessment/Plan  * SDH (subdural hematoma) (HCC)- (present on admission)  Assessment & Plan  4.1 mm  associated with temporal skull fracture on the left, small amount of associated pneumocephalus.  BIG 1.  Neuro checks.  Speech Language Pathology cognitive recommending inpatient post acute.   David Ruffin MD. Neurosurgeon. [facility].    Multiple facial fractures, closed, initial encounter (HCC)- (present on admission)  Assessment & Plan  Left periorbital extending into squamous portion of the temporal bone, minimally displaced zygomatic fracture, small periorbital hematoma  Normal pupillary reflexes and full extraocular movement without entrapment  Non-operative management.  Daryn Rajan DDS, MD. [facility].    Encounter for geriatric assessment- (present on admission)  Assessment & Plan  3/5 Geriatric medicine consulted.   Moncho Esparza MD, Geriatric Medicine.    Contraindication to deep vein thrombosis (DVT) prophylaxis- (present on admission)  Assessment & Plan  VTE prophylaxis initially contraindicated secondary to elevated bleeding risk.  3/6 Trauma surveillance venous duplex ultrasonography ordered.    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  Resumed maintenance medication on admission.    Hypertension- (present on admission)  Assessment & Plan  Chronic condition treated with lisinopril.  Resumed maintenance medication on admission.    Trauma- (present on admission)  Assessment & Plan  Fall down steps.  Trauma Green Activation.  Kamran Muñoz MD. Trauma Surgery.      Discussed patient condition with Patient and trauma surgery, Dr. Kamran Muñoz.

## 2024-03-06 NOTE — CARE PLAN
The patient is Stable - Low risk of patient condition declining or worsening    Shift Goals  Clinical Goals: Q4 neuros; safety  Patient Goals: ARAVIND (patient AAO x1 at baseline)  Family Goals: for patient to sleep    Progress made toward(s) clinical / shift goals:  Patient skin integrity maintained; patient has daughter in room and bed alarm on; safety maintained; no non-verbal signs of pain     Patient is not progressing towards the following goals:
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
## 2024-03-06 NOTE — DISCHARGE PLANNING
1000  DPA received HH choice form, sent HH referral and placed in pts media    1030  Agency/Facility Name: [facility]  Spoke To: Adeline  Outcome: DPA inquired pending HH. Per Adeline they are working on it.  Advised DWAYNE OSMAN.

## 2024-03-06 NOTE — ASSESSMENT & PLAN NOTE
Patient has dementia and recurrent falls  Patient needs help in all daily activities  Patient is on high risk for delirium  Family planning to get group home after discharging from the hospital  Continue nonpharmacological treatment

## 2024-03-06 NOTE — PROGRESS NOTES
Patient discharged from unit to [place] via [facility] transport. Discharge instruction and education provided to daughter at bedside. All IV's removed. Patient has all her belongings.

## 2024-03-06 NOTE — WOUND TEAM
[facility]  Inpatient Services  Initial Wound and Skin Care Evaluation    Admission Date: 3/4/2024     Last order of IP CONSULT TO WOUND CARE was found on 3/5/2024 from Hospital Encounter on 3/4/2024     HPI, PMH, SH: Reviewed    Past Surgical History:   Procedure Laterality Date    ORIF, ANKLE Right 1/22/2019    Procedure: ANKLE ORIF;  Surgeon: Rajan Griffin M.D.;  Location: SUR[facility];  Service: Orthopedics    COLONOSCOPY - ENDO N/A 6/18/2018    Procedure: COLONOSCOPY - ENDO;  Surgeon: Carlos Rudolph M.D.;  Location: ENDOSCOPY [facility];  Service: Gastroenterology    GASTROSCOPY-ENDO  6/16/2018    Procedure: GASTROSCOPY-ENDO;  Surgeon: Braden Monge M.D.;  Location: ENDOSCOPY [facility];  Service: Gastroenterology    IRRIGATION & DEBRIDEMENT ORTHO Right 7/23/2015    Procedure: IRRIGATION & DEBRIDEMENT ORTHO KNEE ;  Surgeon: Sameer Kwan M.D.;  Location: SURGERY [facility];  Service:     ORIF, PATELLA  7/23/2015    Procedure: PATELLA ORIF REVISION;  Surgeon: Sameer Kwan M.D.;  Location: SURGERY [facility];  Service:     IRRIGATION & DEBRIDEMENT ORTHO Right 7/21/2015    Procedure: IRRIGATION & DEBRIDEMENT ORTHO;  Surgeon: Sameer Kwan M.D.;  Location: SURGERY [facility];  Service:     HARDWARE REMOVAL ORTHO  7/21/2015    Procedure: HARDWARE REMOVAL PATELLA;  Surgeon: Sameer Kwan M.D.;  Location: SURGERY [facility];  Service:     SEPTOPLASTY  10/7/2010    Performed by TRISTON SYLVESTER at SURGERY [facility] ORS    SEPTOPLASTY  4/26/2010    Performed by TRISTON SYLVESTER at SURGERY SAME DAY [facility] ORS    TURBINATE REDUCTION  4/26/2010    Performed by TRISTON SYLVESTER at SURGERY SAME DAY [facility] ORS    HYSTERECTOMY LAPAROSCOPY  1994    DE BREAST REDUCTION       Social History     Tobacco Use    Smoking status: Never    Smokeless tobacco: Never   Substance Use Topics    Alcohol use: Yes     Comment: 3-4 per week      Chief Complaint   Patient presents with    GLF     Pt BIBA from home. Pt was walking upstairs when she lost her balance and fell backwards hitting the back L side of her head. Pt A+Ox1, per spouse has hx of dementia and this is her baseline. Per  pt does have history of falls and should be using walker, but refuses to.  is placing pt in a group home on Thursday this week.      Diagnosis: SDH (subdural hematoma) (HCC) [S06.5XAA]  Trauma [T14.90XA]    Unit where seen by Wound Team: S191/02     WOUND CONSULT RELATED TO:  sacrum     WOUND TEAM PLAN OF CARE - Frequency of Follow-up:   Nursing to follow dressing orders written for wound care. Contact wound team if area fails to progress, deteriorates or with any questions/concerns if something comes up before next scheduled follow up (See below as to whether wound is following and frequency of wound follow up)   Not following, consult as needed  - sacrum     WOUND HISTORY:    78 y.o. female who presented with injuries sustained in a ground-level fall she fell on steps missing a step and landed on a corner.  There is no loss of consciousness patient did have periorbital soft tissue injury and fractures did have bleeding from her nose and has been nauseous and throwing up some blood        WOUND ASSESSMENT/LDA  Wound 03/04/24 Pressure Injury Sacrum stage 1 redness, slow to estrada (Active)   Date First Assessed/Time First Assessed: 03/04/24 2049   Present on Original Admission: Yes  Hand Hygiene Completed: Yes  Primary Wound Type: Pressure Injury  Location: Sacrum  Wound Description (Comments): stage 1 redness, slow to estrada      Assessments 3/5/2024  2:00 PM   Wound Image     Site Assessment Red   Periwound Assessment Clean;Dry;Intact   Margins Attached edges;Defined edges   Closure Adhesive bandage   Drainage Amount None   Wound Cleansing Not Applicable   Dressing Status Clean;Dry;Intact   Dressing Changed Reapplied   Dressing Cleansing/Solutions Not  Applicable   Dressing Options Offloading Dressing - Sacral   Dressing Change/Treatment Frequency Every 72 hrs, and As Needed   NEXT Dressing Change/Treatment Date 03/07/24   NEXT Weekly Photo (Inpatient Only) 03/12/24   Wound Team Following Not following   Pressure Injury Stage Stage 1        Vascular:    AMPARO:   No results found.    Lab Values:    Lab Results   Component Value Date/Time    WBC 10.2 03/04/2024 04:30 PM    RBC 5.12 03/04/2024 04:30 PM    HEMOGLOBIN 14.8 03/04/2024 04:30 PM    HEMATOCRIT 43.8 03/04/2024 04:30 PM    CREACTPROT 0.06 02/24/2020 11:01 AM    SEDRATEWES 4 02/24/2020 11:01 AM    HBA1C 5.6 06/14/2023 11:48 AM         Culture Results show:  No results found for this or any previous visit (from the past 720 hour(s)).    Pain Level/Medicated:  Patient denies pain       INTERVENTIONS BY WOUND TEAM:  Chart and images reviewed. Discussed with bedside RN. All areas of concern (based on picture review, LDA review and discussion with bedside RN) have been thoroughly assessed. Documentation of areas based on significant findings. This RN in to assess patient. Performed standard wound care which includes appropriate positioning, dressing removal and non-selective debridement. Pictures and measurements obtained weekly if/when required.    Wound:  sacrum   Primary Dressing:  offloading dressing     Advanced Wound Care Discharge Planning  Number of Clinicians necessary to complete wound care: 1  Is patient requiring IV pain medications for dressing changes:  No   Length of time for dressing change 10 min. (This does not include chart review, pre-medication time, set up, clean up or time spent charting.)    Interdisciplinary consultation: Patient, Bedside RN, Kelsi (Wound Tech).  Pressure injury and staging reviewed with N/A.    EVALUATION / RATIONALE FOR TREATMENT:     Date:  03/05/24  Wound Status:  Initial evaluation    Patient with POA small stage 1 injury to sacrum, will heal as expected. Offloading  with dressing.  Bilateral heels intact.          Goals: Steady decrease in wound area and depth weekly.    NURSING PLAN OF CARE ORDERS:  Skin care: See Skin Care orders    NUTRITION RECOMMENDATIONS   Wound Team Recommendations:  N/A    DIET ORDERS (From admission to next 24h)       Start     Ordered    03/05/24 0906  Diet Order Diet: Regular  ALL MEALS        Question:  Diet:  Answer:  Regular    03/05/24 0905                    PREVENTATIVE INTERVENTIONS:    Q shift Allan - performed per nursing policy  Q shift pressure point assessments - performed per nursing policy    Surface/Positioning  Standard/trauma mattress - Currently in Place  Reposition q 2 hours - Currently in Place    Offloading/Redistribution  Sacral offloading dressing (Silicone dressing) - Currently in Place  Float Heels off Bed with Pillows - Currently in Place         Anticipated discharge plans:  TBD        Vac Discharge Needs:  Vac Discharge plan is purely a recommendation from wound team and not a requirement for discharge unless otherwise stated by physician.  Not Applicable Pt not on a wound vac

## 2024-03-06 NOTE — DISCHARGE PLANNING
DC Transport Scheduled    Transport Company Scheduled:  [employer]  Spoke with Sena at [facility] to schedule transport.    Scheduled Date: 3/6/2024  Scheduled Time: 1400    Destination: Home   Destination Name:[facility]  Destination address: [facility] [place] [address]      Notified care team of scheduled transport via [employer].     If there are any changes needed to the DC transportation scheduled, please contact [facility] at ext. [phone] between the hours of 6931-9944 Mon-Fri. If outside those hours, contact the ED Case Manager at ext. [phone].

## 2024-03-06 NOTE — DISCHARGE SUMMARY
Trauma Discharge Summary    DATE OF ADMISSION: 3/4/2024    DATE OF DISCHARGE: 3/6/2024    LENGTH OF STAY: 2 days      ATTENDING PHYSICIAN: Kamran Muñoz M.D.    CONSULTING PHYSICIAN:   1. Daryn Rajan MD., Oral Surgery   2. Jt Patten MD., Geriatric Consultation     DISCHARGE DIAGNOSIS:  Principal Problem:    SDH (subdural hematoma) ([facility])      Overview: 4.1 mm associated with temporal skull fracture on the left, small amount       of associated pneumocephalus  Active Problems:    Multiple facial fractures, closed, initial encounter ([facility])      Overview: Left periorbital extending into squamous portion of the temporal bone,       minimally displaced zygomatic fracture, small periorbital hematoma      Normal pupillary reflexes and full extraocular movement without entrapment      Facial fracture consultation    Moderate vascular dementia with psychotic disturbance ([facility]) (Chronic)    Contraindication to deep vein thrombosis (DVT) prophylaxis    Encounter for geriatric assessment    Dementia ([facility])    Trauma    Hypertension    High cholesterol (Chronic)  Resolved Problems:    * No resolved hospital problems. *      PROCEDURES:  1. None    HISTORY OF PRESENT ILLNESS: The patient is a 78 y.o. female with a history dementia who was reportedly injured in a mechanical ground-level fall on steps. She was transferred to [facility] in [place], [place].    HOSPITAL COURSE: The patient was triaged as a TBI alert but upgraded to a consult level trauma activation after imaging showed a 4.1 mm subdural hematoma and multiple facial fractures.  The patient's traumatic brain injury was managed nonoperatively.  Dr. Rajan recommended nonoperative management of her facial fractures.  A geriatric consult was obtained given her age.    On the day of discharge, the patient was neurological at baseline with a GCS of 14.  She had evolving left facial ecchymosis.  She was ambulatory and up to a chair.  She was  tolerating an oral diet.  She was afebrile and nontoxic in appearance.  She will be discharged to [facility] care unit with home health and placed on 3/6/2024.    HOSPITAL PROBLEM LIST:  * SDH (subdural hematoma) (HCC)- (present on admission)  Assessment & Plan  4.1 mm associated with temporal skull fracture on the left, small amount of associated pneumocephalus.  BIG 1.  Neuro checks.  Speech Language Pathology cognitive recommending inpatient post acute.   David Ruffin MD. Neurosurgeon. [facility].    Multiple facial fractures, closed, initial encounter (HCC)- (present on admission)  Assessment & Plan  Left periorbital extending into squamous portion of the temporal bone, minimally displaced zygomatic fracture, small periorbital hematoma  Normal pupillary reflexes and full extraocular movement without entrapment  Non-operative management.  Daryn Rajan DDS, MD. [facility] Facial [facility].    Encounter for geriatric assessment- (present on admission)  Assessment & Plan  3/5 Geriatric medicine consulted.   Moncho Esparza MD, Geriatric Medicine.    Contraindication to deep vein thrombosis (DVT) prophylaxis- (present on admission)  Assessment & Plan  VTE prophylaxis initially contraindicated secondary to elevated bleeding risk.  3/6 Trauma surveillance venous duplex ultrasonography ordered.    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  Resumed maintenance medication on admission.    Hypertension- (present on admission)  Assessment & Plan  Chronic condition treated with lisinopril.  Resumed maintenance medication on admission.    Trauma- (present on admission)  Assessment & Plan  Fall down steps.  Trauma Green Activation.  Kamran Muñoz MD. Trauma Surgery.          DISPOSITION:  Discharged on 3/7/2024. The patient and family were counseled and questions were answered. Specifically, signs and symptoms of infection, neurological decompensation, nd persistent or worsening pain were discussed and the patient agrees to seek medical attention if any of these develop.    DISCHARGE MEDICATIONS:  The patients controlled substance history was reviewed and a controlled substance use informed consent (if applicable) was provided by [facility] and the patient has been prescribed.     Medication List        START taking these medications        Instructions   acetaminophen 325 MG Tabs  Commonly known as: Tylenol   Take 1 Tablet by mouth every four hours as needed for Mild Pain.  Dose: 325 mg            CONTINUE taking these medications        Instructions   FLUoxetine 20 MG Caps  Commonly known as: PROzac   Take 20 mg by mouth every day.  Dose: 20 mg     Gemtesa 75 MG tablet  Generic drug: vibegron   Take 75 mg by mouth every evening.  Dose: 75 mg     hydrOXYzine HCl 25 MG Tabs  Commonly known as: Atarax   Take 25 mg by mouth 4 times a day as needed.  Dose: 25 mg     lisinopril 20 MG Tabs  Commonly known as: Prinivil   Take 20 mg by mouth every day.  Dose: 20 mg     Multivitamin Adults Tabs   Take 1 Tablet by mouth every evening.  Dose: 1 Tablet     omeprazole 20 MG delayed-release capsule  Commonly known as: PriLOSEC   Take 20 mg by mouth every day.  Dose: 20 mg     Prolia 60 MG/ML Soln  Generic drug: denosumab   Inject 60 mg as instructed Once. Every 6 months  Dose: 60 mg     QUEtiapine 25 MG Tabs  Commonly known as: SEROquel   Take 25 mg by mouth at bedtime.  Dose: 25 mg     rosuvastatin 20 MG Tabs  Commonly known as: Crestor   Take 20 mg by mouth at bedtime.  Dose: 20 mg     Vitamin D-3 Super Strength 2000 UNIT Tabs  Generic drug: Cholecalciferol   Take 2,000 Units by mouth every day.  Dose: 2,000 Units              ACTIVITY:  As tolerated     WOUND CARE:  Open to  air    DIET:  Orders Placed This Encounter   Procedures    Diet Order Diet: Regular     Standing Status:   Standing     Number of Occurrences:   1     Order Specific Question:   Diet:     Answer:   Regular [1]       FOLLOW UP:  Daryn Rajan D.D.S.  [address]  [place] [address]  [phone]    Schedule an appointment as soon as possible for a visit in 2 week(s)  facial fractures, wound check    Blake Del Angel M.D.  [id number]  [place] [address]  [phone]    Schedule an appointment as soon as possible for a visit in 1 week(s)  As needed, If symptoms worsen      TIME SPENT ON DISCHARGE: 40 minutes    ____________________________________________  LAYLA Block    DD: 3/6/2024 11:41 AM

## 2024-03-06 NOTE — DISCHARGE INSTRUCTIONS
- Call or seek medical attention for questions or concerns  - Discharge to memory care unit with home health.  - Avoid all blood thinners including aspirin or NSAIDs (ibuprophen, Advil, Aleve, Motrin) for at least two weeks  - No nose blowing or straws x 2 weeks   - Resume regular diet  - May take over the counter acetaminophen as needed for pain  - Continue daily over the counter stool softener while on narcotics  - No operation of machinery or motorized vehicles while under the influence of narcotics  - No alcohol, marijuana or illicit drug use while under the influence of narcotics  - In the event of a narcotic overdose naloxone (Narcan) is available without a prescription from any [facility] or [facility]  - No swimming, hot tubs, baths or wound submersion until cleared by outpatient provider. May shower  - No contact sports, strenuous activities, or heavy lifting until cleared by outpatient provider  - If respiratory decompensation, persistent or worsening pain, or neurological decompensation, or signs or symptoms of infection occur seek medical attention

## 2024-03-06 NOTE — THERAPY
Physical Therapy   Initial Evaluation     Patient Name: Michelle Moon  Age:  78 y.o., Sex:  female  Medical Record #: [id number]  Today's Date: 3/6/2024     Precautions  Precautions: Fall Risk  Comments: Seizure precautions    Assessment  Patient is 78 y.o. female who was admitted 3/4/2024 after sustaining a fall down steps.     Currently been managed for left subdural hematoma, left temporal skull fracture, multiple closed facial fractures     PMH: Dementia, HTN, HLD, moderate vascular dementia with psychotic disturbance     Patient seen for PT evaluation. Patient in bed, agreeable for the session. Able to demonstrate functional mobility tasks as detailed below. Will continue to benefit from PT services to help improve overall functional mobility. Recommend  PT and 24x7 supervision/assistance for safety concerns.     Plan    Physical Therapy Initial Treatment Plan   Treatment Plan : Bed Mobility, Gait Training, Neuro Re-Education / Balance, Therapeutic Activities, Therapeutic Exercise  Treatment Frequency: 3 Times per Week  Duration: Until Therapy Goals Met    DC Equipment Recommendations: None  Discharge Recommendations: Recommend home health for continued physical therapy services (24X7 supervision for safety concerns)     Objective     03/06/24 1021   Initial Contact Note    Initial Contact Note Order Received and Verified, Physical Therapy Evaluation in Progress with Full Report to Follow.   Precautions   Precautions Fall Risk   Comments Seizure precautions   Vitals   Pulse (!) 55   Patient BP Position Sitting  (EOB)   Blood Pressure  116/81   Pulse Oximetry 96 %   O2 Delivery Device None - Room Air   Pain   Pain Scales 0 to 10 Scale    Intervention Declines   Prior Living Situation   Prior Services Intermittent Physical Support for ADL Per Family   Housing / Facility Assisted Living Residence  ([place])   Steps Into Home 1   Steps In Home 0   Rail None   Equipment Owned Front-Wheel Walker;4-Wheel  Walker;Wheelchair;Single Point Cane   Lives with - Patient's Self Care Capacity Attendant / Paid Care Giver   Comments Daughter [facility]   Prior Level of Functional Mobility   Bed Mobility Independent   Transfer Status Independent   Ambulation Independent   Ambulation Distance Household   Assistive Devices Used None   Stairs Independent   Comments Patient had not been using any AD despite encouragement.   History of Falls   History of Falls Yes   Date of Last Fall   (Per daughter, H/O several falls in the last 1 year)   Cognition    Cognition / Consciousness X   Speech/ Communication Delayed Responses   Orientation Level Not Oriented to Time;Not Oriented to Reason   Level of Consciousness Alert   Ability To Follow Commands 1 Step   Safety Awareness Impaired   New Learning Impaired   Sequencing Impaired   Passive ROM Lower Body   Passive ROM Lower Body WDL   Active ROM Lower Body    Active ROM Lower Body  WDL   Strength Lower Body   Lower Body Strength  X   Comments Grossly BLE 3+/5   Sensation Lower Body   Lower Extremity Sensation   WDL   Comments Denies any numbness and tingling in LE   Lower Body Muscle Tone   Lower Body Muscle Tone  WDL   Coordination Lower Body    Coordination Lower Body  WDL   Comments Heel to shin-WNL   Other Treatments   Other Treatments Provided Patient & daughter educated about daily mobility, OOB to chair for meals and ambulating with assistance from nursing. Discussed DC concerns and recommendations with daughter, plan is for patient to DC to [facility] unit with  PT.   Balance Assessment   Sitting Balance (Static) Fair   Sitting Balance (Dynamic) Fair   Standing Balance (Static) Fair -   Standing Balance (Dynamic) Poor +   Weight Shift Sitting Good   Weight Shift Standing Fair   Comments Seated EOB; Standing with FWW; Standing W/O AD-static balance is poor; Balance improved with FWW   Bed Mobility    Supine to  Sit Supervised   Sit to Supine Supervised   Scooting Supervised   Rolling Supervised   Comments HOB flat, W/O use of rails, towards R side   Gait Analysis   Gait Level Of Assist Contact Guard Assist   Assistive Device Front Wheel Walker   Distance (Feet) 40   Deviation Decreased Base Of Support;Bradykinetic;Other (Comment)  (Reduced step & stride length)   Comments Attempted side steps by the bed without AD, patient requiring Mod A with multiple loss of balance. Trialed with FWW, improved balance & gait speed. Cues for directions, appropriate use of AD, posture.   Functional Mobility   Sit to Stand Contact Guard Assist   Bed, Chair, Wheelchair Transfer Contact Guard Assist   Transfer Method Stand Step   Mobility Bed-EOB-ambulate in the [id number]   Comments Cues for hand placement, LE placement, sequencing   6 Clicks Assessment - How much HELP from from another person do you currently need... (If the patient hasn't done an activity recently, how much help from another person do you think he/she would need if he/she tried?)   Turning from your back to your side while in a flat bed without using bedrails? 4   Moving from lying on your back to sitting on the side of a flat bed without using bedrails? 3   Moving to and from a bed to a chair (including a wheelchair)? 3   Standing up from a chair using your arms (e.g., wheelchair, or bedside chair)? 3   Walking in hospital room? 3   Climbing 3-5 steps with a railing? 3   6 clicks Mobility Score 19   Activity Tolerance   Sitting in Chair Post session   Patient / Family Goals    Patient / Family Goal #1 To get stronger, be able to walk without falls   Short Term Goals    Short Term Goal # 1 Patient will perform supine-sit & sit-supine with HOB flat independently in 6 visits to safely get in & out of the bed   Short Term Goal # 2 Patient will perform sit-stand and chair transfers with FWW with supervision in 6 visits   Short Term Goal # 3 Patient will ambulate 200 feet  with FWW with supervision in 6 visits to safely ambulate house-hold and limited community distances   Education Group   Education Provided Role of Physical Therapist   Role of Physical Therapist Patient Response Patient;Family;Acceptance;Explanation;Verbal Demonstration   Physical Therapy Initial Treatment Plan    Treatment Plan  Bed Mobility;Gait Training;Neuro Re-Education / Balance;Therapeutic Activities;Therapeutic Exercise   Treatment Frequency 3 Times per Week   Duration Until Therapy Goals Met   Problem List    Problems Impaired Bed Mobility;Impaired Transfers;Impaired Ambulation;Impaired Balance;Safety Awareness Deficits / Cognition   Anticipated Discharge Equipment and Recommendations   DC Equipment Recommendations None   Discharge Recommendations Recommend home health for continued physical therapy services  (24X7 supervision for safety concerns)   Interdisciplinary Plan of Care Collaboration   IDT Collaboration with  Nursing;Family / Caregiver   Patient Position at End of Therapy Seated;Chair Alarm On;Call Light within Reach;Tray Table within Reach;Phone within Reach;Family / Friend in Room  (Daughter at bedside)   Session Information   Date / Session Number  3/6-1(1/3, 3/12)

## 2024-03-06 NOTE — FACE TO FACE
Face to Face Supporting Documentation - Home Health    The encounter with this patient was in whole or in part the primary reason for home health admission.    Date of encounter:   Patient:                    MRN:                       YOB: 2022  Michelle Moon  [id number]  1945     Home health to see patient for:  Home health aide, Physical Therapy evaluation and treatment, Occupational therapy evaluation and treatment, and Speech Language Pathology evaluation and treatment    Skilled need for:  Subdural hematoma and multiple facial fractures    Skilled nursing interventions to include:  Comment: As above    Homebound status evidenced by:  Need the aid of supportive devices such as crutches, canes, wheelchairs or walkers or Needs the assistance of another person in order to leave the home. Leaving home requires a considerable and taxing effort. There is a normal inability to leave the home.    Community Physician to provide follow up care: Blake Del Angel M.D.     Optional Interventions? No      I certify the face to face encounter for this home health care referral meets the CMS requirements and the encounter/clinical assessment with the patient was, in whole, or in part, for the medical condition(s) listed above, which is the primary reason for home health care. Based on my clinical findings: the service(s) are medically necessary, support the need for home health care, and the homebound criteria are met.  I certify that this patient has had a face to face encounter by myself.  VALARIE Block. - NPI: [id number]

## 2024-03-08 LAB — 1,25(OH)2D3 SERPL-MCNC: 151 PG/ML (ref 19.9–79.3)

## 2024-05-04 ENCOUNTER — APPOINTMENT (OUTPATIENT)
Dept: RADIOLOGY | Facility: MEDICAL CENTER | Age: 79
DRG: 310 | End: 2024-05-04
Attending: EMERGENCY MEDICINE
Payer: MEDICARE

## 2024-05-04 ENCOUNTER — HOSPITAL ENCOUNTER (INPATIENT)
Facility: MEDICAL CENTER | Age: 79
LOS: 2 days | DRG: 310 | End: 2024-05-06
Attending: EMERGENCY MEDICINE | Admitting: INTERNAL MEDICINE
Payer: MEDICARE

## 2024-05-04 ENCOUNTER — APPOINTMENT (OUTPATIENT)
Dept: RADIOLOGY | Facility: MEDICAL CENTER | Age: 79
DRG: 310 | End: 2024-05-04
Attending: INTERNAL MEDICINE
Payer: MEDICARE

## 2024-05-04 DIAGNOSIS — R29.6 RECURRENT FALLS: Chronic | ICD-10-CM

## 2024-05-04 DIAGNOSIS — S06.5XAA SDH (SUBDURAL HEMATOMA) (HCC): ICD-10-CM

## 2024-05-04 DIAGNOSIS — F03.C0 SEVERE DEMENTIA, UNSPECIFIED DEMENTIA TYPE, UNSPECIFIED WHETHER BEHAVIORAL, PSYCHOTIC, OR MOOD DISTURBANCE OR ANXIETY (HCC): ICD-10-CM

## 2024-05-04 DIAGNOSIS — R29.6 FREQUENT FALLS: ICD-10-CM

## 2024-05-04 DIAGNOSIS — R00.1 BRADYCARDIA: ICD-10-CM

## 2024-05-04 PROBLEM — R10.84 GENERALIZED ABDOMINAL PAIN: Status: ACTIVE | Noted: 2024-05-04

## 2024-05-04 LAB
25(OH)D3 SERPL-MCNC: 28 NG/ML (ref 30–100)
ALBUMIN SERPL BCP-MCNC: 3.8 G/DL (ref 3.2–4.9)
ALBUMIN/GLOB SERPL: 1.8 G/DL
ALP SERPL-CCNC: 61 U/L (ref 30–99)
ALT SERPL-CCNC: 11 U/L (ref 2–50)
ANION GAP SERPL CALC-SCNC: 12 MMOL/L (ref 7–16)
APPEARANCE UR: CLEAR
AST SERPL-CCNC: 16 U/L (ref 12–45)
BACTERIA #/AREA URNS HPF: NEGATIVE /HPF
BASOPHILS # BLD AUTO: 0.4 % (ref 0–1.8)
BASOPHILS # BLD: 0.03 K/UL (ref 0–0.12)
BILIRUB SERPL-MCNC: 0.3 MG/DL (ref 0.1–1.5)
BILIRUB UR QL STRIP.AUTO: NEGATIVE
BUN SERPL-MCNC: 12 MG/DL (ref 8–22)
CALCIUM ALBUM COR SERPL-MCNC: 9.3 MG/DL (ref 8.5–10.5)
CALCIUM SERPL-MCNC: 9.1 MG/DL (ref 8.5–10.5)
CHLORIDE SERPL-SCNC: 105 MMOL/L (ref 96–112)
CO2 SERPL-SCNC: 24 MMOL/L (ref 20–33)
COLOR UR: YELLOW
CREAT SERPL-MCNC: 0.46 MG/DL (ref 0.5–1.4)
EKG IMPRESSION: NORMAL
EOSINOPHIL # BLD AUTO: 0.1 K/UL (ref 0–0.51)
EOSINOPHIL NFR BLD: 1.5 % (ref 0–6.9)
EPI CELLS #/AREA URNS HPF: NEGATIVE /HPF
ERYTHROCYTE [DISTWIDTH] IN BLOOD BY AUTOMATED COUNT: 46.9 FL (ref 35.9–50)
GFR SERPLBLD CREATININE-BSD FMLA CKD-EPI: 99 ML/MIN/1.73 M 2
GLOBULIN SER CALC-MCNC: 2.1 G/DL (ref 1.9–3.5)
GLUCOSE SERPL-MCNC: 95 MG/DL (ref 65–99)
GLUCOSE UR STRIP.AUTO-MCNC: NEGATIVE MG/DL
HCT VFR BLD AUTO: 38.5 % (ref 37–47)
HGB BLD-MCNC: 12.7 G/DL (ref 12–16)
HYALINE CASTS #/AREA URNS LPF: ABNORMAL /LPF
IMM GRANULOCYTES # BLD AUTO: 0.04 K/UL (ref 0–0.11)
IMM GRANULOCYTES NFR BLD AUTO: 0.6 % (ref 0–0.9)
KETONES UR STRIP.AUTO-MCNC: NEGATIVE MG/DL
LEUKOCYTE ESTERASE UR QL STRIP.AUTO: ABNORMAL
LIPASE SERPL-CCNC: 21 U/L (ref 11–82)
LYMPHOCYTES # BLD AUTO: 2.4 K/UL (ref 1–4.8)
LYMPHOCYTES NFR BLD: 35.9 % (ref 22–41)
MCH RBC QN AUTO: 29.4 PG (ref 27–33)
MCHC RBC AUTO-ENTMCNC: 33 G/DL (ref 32.2–35.5)
MCV RBC AUTO: 89.1 FL (ref 81.4–97.8)
MICRO URNS: ABNORMAL
MONOCYTES # BLD AUTO: 0.46 K/UL (ref 0–0.85)
MONOCYTES NFR BLD AUTO: 6.9 % (ref 0–13.4)
NEUTROPHILS # BLD AUTO: 3.65 K/UL (ref 1.82–7.42)
NEUTROPHILS NFR BLD: 54.7 % (ref 44–72)
NITRITE UR QL STRIP.AUTO: NEGATIVE
NRBC # BLD AUTO: 0 K/UL
NRBC BLD-RTO: 0 /100 WBC (ref 0–0.2)
PH UR STRIP.AUTO: 6 [PH] (ref 5–8)
PLATELET # BLD AUTO: 238 K/UL (ref 164–446)
PMV BLD AUTO: 10.2 FL (ref 9–12.9)
POTASSIUM SERPL-SCNC: 4.1 MMOL/L (ref 3.6–5.5)
PROT SERPL-MCNC: 5.9 G/DL (ref 6–8.2)
PROT UR QL STRIP: NEGATIVE MG/DL
RBC # BLD AUTO: 4.32 M/UL (ref 4.2–5.4)
RBC # URNS HPF: ABNORMAL /HPF
RBC UR QL AUTO: NEGATIVE
SODIUM SERPL-SCNC: 141 MMOL/L (ref 135–145)
SP GR UR STRIP.AUTO: 1.01
TROPONIN T SERPL-MCNC: 8 NG/L (ref 6–19)
UROBILINOGEN UR STRIP.AUTO-MCNC: 0.2 MG/DL
WBC # BLD AUTO: 6.7 K/UL (ref 4.8–10.8)
WBC #/AREA URNS HPF: ABNORMAL /HPF

## 2024-05-04 PROCEDURE — 99223 1ST HOSP IP/OBS HIGH 75: CPT | Mod: AI | Performed by: INTERNAL MEDICINE

## 2024-05-04 PROCEDURE — 99222 1ST HOSP IP/OBS MODERATE 55: CPT | Performed by: INTERNAL MEDICINE

## 2024-05-04 RX ORDER — HYDRALAZINE HYDROCHLORIDE 20 MG/ML
10 INJECTION INTRAMUSCULAR; INTRAVENOUS EVERY 4 HOURS PRN
Status: DISCONTINUED | OUTPATIENT
Start: 2024-05-04 | End: 2024-05-06 | Stop reason: HOSPADM

## 2024-05-04 RX ORDER — ALPRAZOLAM 0.25 MG/1
0.25 TABLET ORAL NIGHTLY PRN
Status: DISCONTINUED | OUTPATIENT
Start: 2024-05-04 | End: 2024-05-04

## 2024-05-04 RX ORDER — OMEPRAZOLE 20 MG/1
20 CAPSULE, DELAYED RELEASE ORAL DAILY
Status: DISCONTINUED | OUTPATIENT
Start: 2024-05-04 | End: 2024-05-06 | Stop reason: HOSPADM

## 2024-05-04 RX ORDER — ALPRAZOLAM 0.25 MG/1
0.25 TABLET ORAL 2 TIMES DAILY PRN
Status: DISCONTINUED | OUTPATIENT
Start: 2024-05-04 | End: 2024-05-05

## 2024-05-04 RX ORDER — FLUOXETINE HYDROCHLORIDE 20 MG/1
20 CAPSULE ORAL DAILY
Status: DISCONTINUED | OUTPATIENT
Start: 2024-05-04 | End: 2024-05-06 | Stop reason: HOSPADM

## 2024-05-04 RX ORDER — QUETIAPINE FUMARATE 25 MG/1
50 TABLET, FILM COATED ORAL
Status: DISCONTINUED | OUTPATIENT
Start: 2024-05-04 | End: 2024-05-06 | Stop reason: HOSPADM

## 2024-05-04 RX ORDER — POLYETHYLENE GLYCOL 3350 17 G/17G
1 POWDER, FOR SOLUTION ORAL
Status: DISCONTINUED | OUTPATIENT
Start: 2024-05-04 | End: 2024-05-06 | Stop reason: HOSPADM

## 2024-05-04 RX ORDER — AMOXICILLIN 250 MG
2 CAPSULE ORAL 2 TIMES DAILY
Status: DISCONTINUED | OUTPATIENT
Start: 2024-05-04 | End: 2024-05-06 | Stop reason: HOSPADM

## 2024-05-04 RX ORDER — LORAZEPAM 2 MG/ML
0.5 INJECTION INTRAMUSCULAR ONCE
Status: COMPLETED | OUTPATIENT
Start: 2024-05-04 | End: 2024-05-04

## 2024-05-04 RX ORDER — ROSUVASTATIN CALCIUM 20 MG/1
20 TABLET, COATED ORAL
Status: DISCONTINUED | OUTPATIENT
Start: 2024-05-04 | End: 2024-05-06 | Stop reason: HOSPADM

## 2024-05-04 RX ORDER — HYDROXYZINE HYDROCHLORIDE 25 MG/1
25 TABLET, FILM COATED ORAL ONCE
Status: COMPLETED | OUTPATIENT
Start: 2024-05-04 | End: 2024-05-04

## 2024-05-04 RX ORDER — ONDANSETRON 2 MG/ML
4 INJECTION INTRAMUSCULAR; INTRAVENOUS EVERY 4 HOURS PRN
Status: DISCONTINUED | OUTPATIENT
Start: 2024-05-04 | End: 2024-05-06 | Stop reason: HOSPADM

## 2024-05-04 RX ORDER — ONDANSETRON 4 MG/1
4 TABLET, ORALLY DISINTEGRATING ORAL EVERY 4 HOURS PRN
Status: DISCONTINUED | OUTPATIENT
Start: 2024-05-04 | End: 2024-05-06 | Stop reason: HOSPADM

## 2024-05-04 RX ORDER — LISINOPRIL 20 MG/1
20 TABLET ORAL DAILY
Status: DISCONTINUED | OUTPATIENT
Start: 2024-05-05 | End: 2024-05-06 | Stop reason: HOSPADM

## 2024-05-04 RX ORDER — QUETIAPINE FUMARATE 25 MG/1
50 TABLET, FILM COATED ORAL
Status: DISCONTINUED | OUTPATIENT
Start: 2024-05-04 | End: 2024-05-04

## 2024-05-04 RX ORDER — ACETAMINOPHEN 325 MG/1
650 TABLET ORAL EVERY 6 HOURS PRN
Status: DISCONTINUED | OUTPATIENT
Start: 2024-05-04 | End: 2024-05-06 | Stop reason: HOSPADM

## 2024-05-04 RX ORDER — HEPARIN SODIUM 5000 [USP'U]/ML
5000 INJECTION, SOLUTION INTRAVENOUS; SUBCUTANEOUS EVERY 8 HOURS
Status: DISCONTINUED | OUTPATIENT
Start: 2024-05-04 | End: 2024-05-06

## 2024-05-04 RX ORDER — ALPRAZOLAM 0.25 MG/1
0.25 TABLET ORAL 3 TIMES DAILY PRN
Status: DISCONTINUED | OUTPATIENT
Start: 2024-05-04 | End: 2024-05-04

## 2024-05-04 RX ORDER — HYDROXYZINE HYDROCHLORIDE 25 MG/1
25 TABLET, FILM COATED ORAL EVERY 6 HOURS PRN
Status: DISCONTINUED | OUTPATIENT
Start: 2024-05-04 | End: 2024-05-06 | Stop reason: HOSPADM

## 2024-05-04 RX ADMIN — OMEPRAZOLE 20 MG: 20 CAPSULE, DELAYED RELEASE ORAL at 11:37

## 2024-05-04 RX ADMIN — ALPRAZOLAM 0.25 MG: 0.25 TABLET ORAL at 21:51

## 2024-05-04 RX ADMIN — LORAZEPAM 0.5 MG: 2 INJECTION INTRAMUSCULAR; INTRAVENOUS at 08:39

## 2024-05-04 RX ADMIN — HEPARIN SODIUM 5000 UNITS: 5000 INJECTION, SOLUTION INTRAVENOUS; SUBCUTANEOUS at 21:51

## 2024-05-04 RX ADMIN — FLUOXETINE HYDROCHLORIDE 20 MG: 20 CAPSULE ORAL at 11:37

## 2024-05-04 RX ADMIN — ROSUVASTATIN CALCIUM 20 MG: 20 TABLET, FILM COATED ORAL at 21:50

## 2024-05-04 RX ADMIN — SENNOSIDES AND DOCUSATE SODIUM 2 TABLET: 50; 8.6 TABLET ORAL at 17:03

## 2024-05-04 RX ADMIN — HYDROXYZINE HYDROCHLORIDE 25 MG: 25 TABLET, FILM COATED ORAL at 17:03

## 2024-05-04 RX ADMIN — HYDROXYZINE HYDROCHLORIDE 25 MG: 25 TABLET, FILM COATED ORAL at 08:24

## 2024-05-04 RX ADMIN — IOHEXOL 100 ML: 350 INJECTION, SOLUTION INTRAVENOUS at 07:15

## 2024-05-04 RX ADMIN — QUETIAPINE FUMARATE 50 MG: 25 TABLET ORAL at 17:58

## 2024-05-04 ASSESSMENT — LIFESTYLE VARIABLES
AVERAGE NUMBER OF DAYS PER WEEK YOU HAVE A DRINK CONTAINING ALCOHOL: 0
TOTAL SCORE: 0
CONSUMPTION TOTAL: NEGATIVE
HOW MANY TIMES IN THE PAST YEAR HAVE YOU HAD 5 OR MORE DRINKS IN A DAY: 0
HAVE YOU EVER FELT YOU SHOULD CUT DOWN ON YOUR DRINKING: NO
TOTAL SCORE: 0
HAVE PEOPLE ANNOYED YOU BY CRITICIZING YOUR DRINKING: NO
EVER HAD A DRINK FIRST THING IN THE MORNING TO STEADY YOUR NERVES TO GET RID OF A HANGOVER: NO
EVER FELT BAD OR GUILTY ABOUT YOUR DRINKING: NO
DOES PATIENT WANT TO STOP DRINKING: NO
ALCOHOL_USE: NO
TOTAL SCORE: 0
ON A TYPICAL DAY WHEN YOU DRINK ALCOHOL HOW MANY DRINKS DO YOU HAVE: 0

## 2024-05-04 ASSESSMENT — COGNITIVE AND FUNCTIONAL STATUS - GENERAL
PERSONAL GROOMING: A LITTLE
MOVING FROM LYING ON BACK TO SITTING ON SIDE OF FLAT BED: A LITTLE
DAILY ACTIVITIY SCORE: 17
DRESSING REGULAR UPPER BODY CLOTHING: A LITTLE
SUGGESTED CMS G CODE MODIFIER DAILY ACTIVITY: CK
MOBILITY SCORE: 16
HELP NEEDED FOR BATHING: A LITTLE
EATING MEALS: A LITTLE
WALKING IN HOSPITAL ROOM: A LITTLE
MOVING TO AND FROM BED TO CHAIR: A LITTLE
SUGGESTED CMS G CODE MODIFIER MOBILITY: CK
STANDING UP FROM CHAIR USING ARMS: A LOT
TOILETING: A LOT
TURNING FROM BACK TO SIDE WHILE IN FLAT BAD: A LITTLE
CLIMB 3 TO 5 STEPS WITH RAILING: A LOT
DRESSING REGULAR LOWER BODY CLOTHING: A LITTLE

## 2024-05-04 ASSESSMENT — FIBROSIS 4 INDEX: FIB4 SCORE: 2.18

## 2024-05-04 ASSESSMENT — PATIENT HEALTH QUESTIONNAIRE - PHQ9
SUM OF ALL RESPONSES TO PHQ9 QUESTIONS 1 AND 2: 0
2. FEELING DOWN, DEPRESSED, IRRITABLE, OR HOPELESS: NOT AT ALL
1. LITTLE INTEREST OR PLEASURE IN DOING THINGS: NOT AT ALL

## 2024-05-04 ASSESSMENT — PAIN DESCRIPTION - PAIN TYPE: TYPE: ACUTE PAIN

## 2024-05-04 ASSESSMENT — ENCOUNTER SYMPTOMS
ABDOMINAL PAIN: 1
BACK PAIN: 1

## 2024-05-04 NOTE — CARE PLAN
The patient is Watcher - Medium risk of patient condition declining or worsening    Shift Goals  Clinical Goals: pain management      Problem: Fall Risk  Goal: Patient will remain free from falls  Outcome: Progressing         Problem: Knowledge Deficit - Standard  Goal: Patient and family/care givers will demonstrate understanding of plan of care, disease process/condition, diagnostic tests and medications  Outcome: Not Met

## 2024-05-04 NOTE — CONSULTS
Reason for Consult:  Asked by Dr Abhilash Tucker M.D. to see this patient with recurrent falls and bradycardia  Patient's PCP: Blake Del Angel M.D.    CC:   Chief Complaint   Patient presents with    Flank Pain     L flank tenderness x48 hours    Abdominal Pain     X1 day       HPI: This is a 78-year-old woman with recurrent falls including subarachnoid hemorrhage and March 2024 with dementia.  I spoke with her  over the phone as she is unable to contribute to the history that she has had multiple falls and was noted to have bradycardia here she has not had any sustained bradycardia to a significant degree she is normotensive.  Looking back at her records she has had persistently bradycardic vital signs in the 50s with normotension.  Her  thinks she had an event monitor at [facility] and number years ago she saw Dr. Florian in 2017 the results of those records are unavailable.    Medications / Drug list prior to admission:  No current facility-administered medications on file prior to encounter.     Current Outpatient Medications on File Prior to Encounter   Medication Sig Dispense Refill    multivitamin Tab Take 1 Tablet by mouth every day.      acetaminophen (TYLENOL) 325 MG Tab Take 1 Tablet by mouth every four hours as needed for Mild Pain.      QUEtiapine (SEROQUEL) 25 MG Tab Take 2 Tablets by mouth at bedtime. 60 Tablet 3    hydrOXYzine HCl (ATARAX) 25 MG Tab Take 25 mg by mouth every 6 hours as needed for Anxiety.      GEMTESA 75 MG tablet Take 75 mg by mouth every evening.      rosuvastatin (CRESTOR) 20 MG Tab Take 20 mg by mouth at bedtime.      lisinopril (PRINIVIL) 20 MG Tab Take 20 mg by mouth every day.      omeprazole (PRILOSEC) 20 MG delayed-release capsule Take 20 mg by mouth every day.      FLUoxetine (PROZAC) 20 MG Cap Take 20 mg by mouth every day.      denosumab (PROLIA) 60 MG/ML Solution Inject 60 mg as instructed Once. Every 6 months         Current list of administered  Medications:    Current Facility-Administered Medications:     acetaminophen (Tylenol) tablet 650 mg, 650 mg, Oral, Q6HRS PRN, Abhilash Tucker M.D.    senna-docusate (Pericolace Or Senokot S) 8.6-50 MG per tablet 2 Tablet, 2 Tablet, Oral, BID **AND** polyethylene glycol/lytes (Miralax) Packet 1 Packet, 1 Packet, Oral, QDAY PRN, Abhilash Tucker M.D.    heparin injection 5,000 Units, 5,000 Units, Subcutaneous, Q8HRS, Abhilash Tucker M.D.    hydrALAZINE (Apresoline) injection 10 mg, 10 mg, Intravenous, Q4HRS PRN, Abhilash Tucker M.D.    ondansetron (Zofran) syringe/vial injection 4 mg, 4 mg, Intravenous, Q4HRS PRN, Abhilash Tucker M.D.    ondansetron (Zofran ODT) dispertab 4 mg, 4 mg, Oral, Q4HRS PRN, Abhilash Tucker M.D.    FLUoxetine (PROzac) capsule 20 mg, 20 mg, Oral, DAILY, Abhilash Tucker M.D., 20 mg at 05/04/24 1137    [START ON 5/5/2024] lisinopril (Prinivil) tablet 20 mg, 20 mg, Oral, DAILY, Abhilash Tucker M.D.    omeprazole (PriLOSEC) capsule 20 mg, 20 mg, Oral, DAILY, Abhilash Tucker M.D., 20 mg at 05/04/24 1137    QUEtiapine (SEROquel) tablet 50 mg, 50 mg, Oral, QHS, Abhilash Tucker M.D.    rosuvastatin (Crestor) tablet 20 mg, 20 mg, Oral, QHS, Abhilash Tucker M.D.    hydrOXYzine HCl (Atarax) tablet 25 mg, 25 mg, Oral, Q6HRS PRN, Abhilash Tucker M.D.    ALPRAZolam (Xanax) tablet 0.25 mg, 0.25 mg, Oral, HS PRN, Hung V. Garrett, M.D.    Past Medical History:   Diagnosis Date    High cholesterol 3/4/2024    Hypertension 3/4/2024    Small intestinal hemorrhage not requiring more than four units of blood in 24 hours, admission to ICU, or surgery 2018    Stomach ulcer        Past Surgical History:   Procedure Laterality Date    ORIF, ANKLE Right 1/22/2019    Procedure: ANKLE ORIF;  Surgeon: Rajan Griffin M.D.;  Location: SURGERY [facility];  Service: Orthopedics    COLONOSCOPY - ENDO N/A 6/18/2018    Procedure: COLONOSCOPY - ENDO;  Surgeon: Carlos CORTEZ  KATELYNN Rudolph;  Location: ENDOSCOPY [facility];  Service: Gastroenterology    GASTROSCOPY-ENDO  6/16/2018    Procedure: GASTROSCOPY-ENDO;  Surgeon: Braden Monge M.D.;  Location: ENDOSCOPY [facility];  Service: Gastroenterology    IRRIGATION & DEBRIDEMENT ORTHO Right 7/23/2015    Procedure: IRRIGATION & DEBRIDEMENT ORTHO KNEE ;  Surgeon: Sameer Kwan M.D.;  Location: SURGERY [facility];  Service:     ORIF, PATELLA  7/23/2015    Procedure: PATELLA ORIF REVISION;  Surgeon: Sameer Kwan M.D.;  Location: SURGERY [facility];  Service:     IRRIGATION & DEBRIDEMENT ORTHO Right 7/21/2015    Procedure: IRRIGATION & DEBRIDEMENT ORTHO;  Surgeon: Sameer Kwan M.D.;  Location: SURGERY [facility];  Service:     HARDWARE REMOVAL ORTHO  7/21/2015    Procedure: HARDWARE REMOVAL PATELLA;  Surgeon: Sameer Kwan M.D.;  Location: SURGERY [facility];  Service:     SEPTOPLASTY  10/7/2010    Performed by TRISTON SYLVESTER at SURGERY SAME DAY [facility] ORS    SEPTOPLASTY  4/26/2010    Performed by TRISTON SYLVESTER at SURGERY SAME DAY [facility] ORS    TURBINATE REDUCTION  4/26/2010    Performed by TRISTON SYLVESTER at SURGERY SAME DAY [facility] ORS    HYSTERECTOMY LAPAROSCOPY  1994    CT BREAST REDUCTION         Family History   Problem Relation Age of Onset    Hypertension Other     Cancer Other      Patient family history was personally reviewed, no pertinent family history to current presentation    Social History     Tobacco Use    Smoking status: Never    Smokeless tobacco: Never   Vaping Use    Vaping Use: Never used   Substance Use Topics    Alcohol use: Yes     Comment: 3-4 per week    Drug use: No       ALLERGIES:  Allergies   Allergen Reactions    Nkda [No Known Drug Allergy]        Review of systems:  A complete review of symptoms was reviewed with patient. This is reviewed in H&P and PMH. ALL OTHERS reviewed and negative    Physical exam:  Patient Vitals for the past 24  "hrs:   BP Temp Temp src Pulse Resp SpO2 Height Weight   24 1518 (!) 144/80 36.1 °C (97 °F) Temporal 60 17 93 % -- --   24 1127 (!) 145/81 36.6 °C (97.9 °F) Temporal (!) 56 18 96 % -- --   24 1103 (!) 144/86 -- -- 60 19 94 % -- --   24 1003 (!) 134/95 -- -- (!) 52 17 95 % -- --   24 0933 (!) 148/103 -- -- -- -- -- -- --   24 0932 -- -- -- (!) 51 18 94 % -- --   24 0903 (!) 145/85 -- -- -- -- -- -- --   24 0902 -- -- -- 61 17 90 % -- --   24 0833 129/80 -- -- (!) 55 -- 94 % -- --   24 0733 130/73 -- -- -- -- -- -- --   24 0731 -- -- -- (!) 56 -- 91 % -- --   24 0600 (!) 142/72 -- -- (!) 58 15 92 % -- --   24 0545 -- -- -- (!) 52 (!) 8 95 % -- --   24 0530 129/80 -- -- (!) 55 20 94 % -- --   24 0515 137/81 -- -- (!) 49 15 92 % -- --   24 0400 127/76 -- -- (!) 47 15 96 % -- --   24 0256 -- -- -- -- -- -- 1.727 m (5' 8\") 66.7 kg (147 lb)   24 0254 128/77 36 °C (96.8 °F) Temporal (!) 49 17 97 % -- --     General: No acute distress.   EYES: no jaundice  HEENT: OP clear   Neck:  No JVD.   CVS:  [Bradycardia but regular  Resp: Normal respiratory effort,   Abdomen: ND,  Skin: Grossly nothing acute no obvious rashes  Neurological: Alert, Moves all extremities  Extremities:   [  no edema. No cyanosis.       Data:  Laboratory studies personally reviewed by me:  Recent Results (from the past 24 hour(s))   EKG    Collection Time: 24  3:47 AM   Result Value Ref Range    Report       [facility] Emergency Dept.    Test Date:  2024  Pt Name:    GENEVA NASH               Department: ER  MRN:        [id number]                      Room:        11  Gender:     Female                       Technician: [id number]  :        [id number]                   Requested By:ER TRIAGE PROTOCOL  Order #:    [id number]                    Reading MD: Mitra Walters MD    Measurements  Intervals                        "         Axis  Rate:       50                           P:          60  NC:         204                          QRS:        72  QRSD:       91                           T:          60  QT:         469  QTc:        428    Interpretive Statements  Sinus bradycardia  Abnormal R-wave progression, early transition  Compared to ECG 03/04/2024 16:51:04  Sinus rhythm no longer present    Electronically Signed On 05- 04:57:57 PDT by Mitra Walters MD     CBC with Differential    Collection Time: 05/04/24  3:55 AM   Result Value Ref Range    WBC 6.7 4.8 - 10.8 K/uL    RBC 4.32 4.20 - 5.40 M/uL    Hemoglobin 12.7 12.0 - 16.0 g/dL    Hematocrit 38.5 37.0 - 47.0 %    MCV 89.1 81.4 - 97.8 fL    MCH 29.4 27.0 - 33.0 pg    MCHC 33.0 32.2 - 35.5 g/dL    RDW 46.9 35.9 - 50.0 fL    Platelet Count 238 164 - 446 K/uL    MPV 10.2 9.0 - 12.9 fL    Neutrophils-Polys 54.70 44.00 - 72.00 %    Lymphocytes 35.90 22.00 - 41.00 %    Monocytes 6.90 0.00 - 13.40 %    Eosinophils 1.50 0.00 - 6.90 %    Basophils 0.40 0.00 - 1.80 %    Immature Granulocytes 0.60 0.00 - 0.90 %    Nucleated RBC 0.00 0.00 - 0.20 /100 WBC    Neutrophils (Absolute) 3.65 1.82 - 7.42 K/uL    Lymphs (Absolute) 2.40 1.00 - 4.80 K/uL    Monos (Absolute) 0.46 0.00 - 0.85 K/uL    Eos (Absolute) 0.10 0.00 - 0.51 K/uL    Baso (Absolute) 0.03 0.00 - 0.12 K/uL    Immature Granulocytes (abs) 0.04 0.00 - 0.11 K/uL    NRBC (Absolute) 0.00 K/uL   Complete Metabolic Panel    Collection Time: 05/04/24  3:55 AM   Result Value Ref Range    Sodium 141 135 - 145 mmol/L    Potassium 4.1 3.6 - 5.5 mmol/L    Chloride 105 96 - 112 mmol/L    Co2 24 20 - 33 mmol/L    Anion Gap 12.0 7.0 - 16.0    Glucose 95 65 - 99 mg/dL    Bun 12 8 - 22 mg/dL    Creatinine 0.46 (L) 0.50 - 1.40 mg/dL    Calcium 9.1 8.5 - 10.5 mg/dL    Correct Calcium 9.3 8.5 - 10.5 mg/dL    AST(SGOT) 16 12 - 45 U/L    ALT(SGPT) 11 2 - 50 U/L    Alkaline Phosphatase 61 30 - 99 U/L    Total Bilirubin 0.3 0.1 - 1.5 mg/dL     Albumin 3.8 3.2 - 4.9 g/dL    Total Protein 5.9 (L) 6.0 - 8.2 g/dL    Globulin 2.1 1.9 - 3.5 g/dL    A-G Ratio 1.8 g/dL   Lipase    Collection Time: 05/04/24  3:55 AM   Result Value Ref Range    Lipase 21 11 - 82 U/L   ESTIMATED GFR    Collection Time: 05/04/24  3:55 AM   Result Value Ref Range    GFR (CKD-EPI) 99 >60 mL/min/1.73 m 2   TROPONIN    Collection Time: 05/04/24  3:55 AM   Result Value Ref Range    Troponin T 8 6 - 19 ng/L   Urinalysis, Culture if Indicated    Collection Time: 05/04/24  5:11 AM    Specimen: Urine, Clean Catch   Result Value Ref Range    Color Yellow     Character Clear     Specific Gravity 1.009 <1.035    Ph 6.0 5.0 - 8.0    Glucose Negative Negative mg/dL    Ketones Negative Negative mg/dL    Protein Negative Negative mg/dL    Bilirubin Negative Negative    Urobilinogen, Urine 0.2 Negative    Nitrite Negative Negative    Leukocyte Esterase Small (A) Negative    Occult Blood Negative Negative    Micro Urine Req Microscopic    URINE MICROSCOPIC (W/UA)    Collection Time: 05/04/24  5:11 AM   Result Value Ref Range    WBC 5-10 (A) /hpf    RBC 0-2 /hpf    Bacteria Negative None /hpf    Epithelial Cells Negative /hpf    Hyaline Cast 0-2 /lpf   VITAMIN D,25 HYDROXY (DEFICIENCY)    Collection Time: 05/04/24  1:24 PM   Result Value Ref Range    25-Hydroxy   Vitamin D 25 28 (L) 30 - 100 ng/mL       Imaging:  US-RUQ   Final Result      1.  Probable small amount of sludge in the gallbladder.   2.  No evidence for acute cholecystitis.   3.  Common bile duct is top normal in size.   4.  Limited evaluation of pancreas and abdominal aorta.      CT-CHEST,ABDOMEN,PELVIS WITH   Final Result         1.  4.2 cm ascending thoracic aortic aneurysm, radiographic follow-up and surveillance recommended as clinically appropriate.   2.  Changes of hepatic steatosis   3.  Diverticulosis   4.  Atherosclerosis and atherosclerotic coronary artery disease      CT-HEAD W/O   Final Result         1.  No acute intracranial  abnormality is identified, there are nonspecific white matter changes, commonly associated with small vessel ischemic disease.  Associated mild cerebral atrophy is noted.   2.  Atherosclerosis.         DX-HIP-UNILATERAL-WITH PELVIS-1 VIEW LEFT   Final Result         1.  No radiographic evidence of acute traumatic injury.      DX-CHEST-PORTABLE (1 VIEW)   Final Result         1.  No acute cardiopulmonary disease.   2.  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 *      Assessment / Plan:  Recurrent falls, it is unclear if this is due to bradycardia she has had bradycardia long-term on our records and no alarming arrhythmia on monitoring with normotension    I discussed with her  over the phone that she would at least benefit from event monitor at home is possible the bradycardia is contributing to falls.  Likely this is multifactorial so I am not clear that a pacemaker would help prevent falls.  Will have to monitor with telemetry and if she has symptomatic bradycardia that would contribute to falls it certainly reasonable to pursue a pacemaker she could have either Micra or a atrial pacemaker if that is also within her goals of care.    Cardiology will follow up with her family and telemetry to see if outpatient event monitor or pacemaker is indicated, the earliest we could put in pacemaker will be Monday.    Check echo for prognosis    I personally discussed her case with  Dr Abhilash Tucker,  M.D.      It is my pleasure to participate in the care of Ms. Moon.  Please do not hesitate to contact me with questions or concerns.    David Elizabeth MD PhD [facility]  Cardiologist [facility]    5/4/2024    Please note that this dictation was created using voice recognition software. There may be errors I did not discover before finalizing the note.

## 2024-05-04 NOTE — H&P
[facility] Medicine History & Physical Note    Date of Service  5/4/2024    Primary Care Physician  Blake Del nAgel M.D.    Consultants  none    Code Status  Full Code    Chief Complaint  Chief Complaint   Patient presents with    Flank Pain     L flank tenderness x48 hours    Abdominal Pain     X1 day       History of Presenting Illness  Michelle Moon is a 78 y.o. female who presented 5/4/2024 with falls and abdominal pain.  Patient lives in [facility] care. Family not at bedside.  Brought in by daughter, reporting multiple falls this week, left flank pain  Agitation.  Patient stated she had left flank pain but felt that this was muscle spasm.    I discussed with EDP, difficult to discern if patient's repeat falls is from symptomatic bradycardia.  Current workup for an infection has been negative.  CT chest, abdomen pelvis did not show any source of infection for patient's abdominal pain complaints.    I discussed the plan of care with bedside RN, charge RN, , and pharmacy.    Review of Systems  Review of Systems   Gastrointestinal:  Positive for abdominal pain.   Musculoskeletal:  Positive for back pain.       Past Medical History   has a past medical history of High cholesterol (3/4/2024), Hypertension (3/4/2024), Small intestinal hemorrhage not requiring more than four units of blood in 24 hours, admission to ICU, or surgery (2018), and Stomach ulcer.    Surgical History   has a past surgical history that includes hysterectomy laparoscopy (1994); septoplasty (4/26/2010); turbinate reduction (4/26/2010); septoplasty (10/7/2010); pr breast reduction; irrigation & debridement ortho (Right, 7/21/2015); hardware removal ortho (7/21/2015); irrigation & debridement ortho (Right, 7/23/2015); orif, patella (7/23/2015); gastroscopy-endo (6/16/2018); colonoscopy - endo (N/A, 6/18/2018); and orif, ankle (Right, 1/22/2019).     Family History  family history includes Cancer in an other family member; Hypertension  in an other family member.   Family history reviewed with patient. There is no family history that is pertinent to the chief complaint.     Social History   reports that she has never smoked. She has never used smokeless tobacco. She reports current alcohol use. She reports that she does not use drugs.    Allergies  Allergies   Allergen Reactions    Nkda [No Known Drug Allergy]        Medications  Prior to Admission Medications   Prescriptions Last Dose Informant Patient Reported? Taking?   Cholecalciferol (VITAMIN D-3 SUPER STRENGTH) 2000 UNIT Tab  Significant Other Yes No   Sig: Take 2,000 Units by mouth every day.   FLUoxetine (PROZAC) 20 MG Cap  Significant Other Yes No   Sig: Take 20 mg by mouth every day.   GEMTESA 75 MG tablet  Significant Other Yes No   Sig: Take 75 mg by mouth every evening.   Multiple Vitamins-Minerals (MULTIVITAMIN ADULTS) Tab  Significant Other Yes No   Sig: Take 1 Tablet by mouth every evening.   PROLIA 60 MG/ML Solution Prefilled Syringe injection   Yes No   QUEtiapine (SEROQUEL) 25 MG Tab   No No   Sig: Take 2 Tablets by mouth at bedtime.   acetaminophen (TYLENOL) 325 MG Tab   No No   Sig: Take 1 Tablet by mouth every four hours as needed for Mild Pain.   denosumab (PROLIA) 60 MG/ML Solution  Significant Other Yes No   Sig: Inject 60 mg as instructed Once. Every 6 months   hydrOXYzine HCl (ATARAX) 25 MG Tab  Significant Other Yes No   Sig: Take 25 mg by mouth 4 times a day as needed.   lisinopril (PRINIVIL) 20 MG Tab  Significant Other Yes No   Sig: Take 20 mg by mouth every day.   omeprazole (PRILOSEC) 20 MG delayed-release capsule  Significant Other Yes No   Sig: Take 20 mg by mouth every day.   rosuvastatin (CRESTOR) 20 MG Tab  Significant Other Yes No   Sig: Take 20 mg by mouth at bedtime.      Facility-Administered Medications: None       Physical Exam  Temp:  [36 °C (96.8 °F)-36.6 °C (97.9 °F)] (P) 36.6 °C (97.9 °F)  Pulse:  [47-61] (P) 56  Resp:  [8-20] (P) 18  BP:  (127-148)/() (P) 145/81  SpO2:  [90 %-97 %] (P) 96 %  Blood Pressure : 130/73   Temperature: 36 °C (96.8 °F)   Pulse: (!) 56   Respiration: 15   Pulse Oximetry: 91 %       Physical Exam  Vitals and nursing note reviewed.   Constitutional:       General: She is not in acute distress.     Appearance: She is not ill-appearing.      Comments: Frail appearing elderly female   HENT:      Head: Normocephalic and atraumatic.      Comments: Temporal muscle wasting positive     Mouth/Throat:      Mouth: Mucous membranes are dry.      Pharynx: No oropharyngeal exudate.   Eyes:      General: No scleral icterus.     Extraocular Movements: Extraocular movements intact.   Cardiovascular:      Rate and Rhythm: Normal rate and regular rhythm.      Pulses: Normal pulses.      Heart sounds: Normal heart sounds. No murmur heard.  Pulmonary:      Effort: Pulmonary effort is normal. No respiratory distress.      Breath sounds: Normal breath sounds. No wheezing.   Abdominal:      General: Abdomen is flat. Bowel sounds are normal. There is no distension.      Palpations: Abdomen is soft.      Tenderness: There is abdominal tenderness (lower abdomen).   Musculoskeletal:         General: No swelling or tenderness.      Cervical back: Normal range of motion. No rigidity.      Right lower leg: No edema.      Left lower leg: No edema.      Comments: Sarcopenic. Bilateral hand muscle atrophy noted.   Skin:     General: Skin is warm.      Capillary Refill: Capillary refill takes less than 2 seconds.      Coloration: Skin is not jaundiced.      Findings: No erythema.   Neurological:      Mental Status: She is alert. Mental status is at baseline. She is disoriented.      Motor: Weakness present.   Psychiatric:         Mood and Affect: Mood normal.         Behavior: Behavior normal.         Thought Content: Thought content normal.         Laboratory:  Recent Labs     05/04/24  0355   WBC 6.7   RBC 4.32   HEMOGLOBIN 12.7   HEMATOCRIT 38.5   MCV  "89.1   MCH 29.4   MCHC 33.0   RDW 46.9   PLATELETCT 238   MPV 10.2     Recent Labs     05/04/24  0355   SODIUM 141   POTASSIUM 4.1   CHLORIDE 105   CO2 24   GLUCOSE 95   BUN 12   CREATININE 0.46*   CALCIUM 9.1     Recent Labs     05/04/24  0355   ALTSGPT 11   ASTSGOT 16   ALKPHOSPHAT 61   TBILIRUBIN 0.3   LIPASE 21   GLUCOSE 95         No results for input(s): \"NTPROBNP\" in the last 72 hours.      Recent Labs     05/04/24  0355   TROPONINT 8       Imaging:  CT-CHEST,ABDOMEN,PELVIS WITH   Final Result         1.  4.2 cm ascending thoracic aortic aneurysm, radiographic follow-up and surveillance recommended as clinically appropriate.   2.  Changes of hepatic steatosis   3.  Diverticulosis   4.  Atherosclerosis and atherosclerotic coronary artery disease      CT-HEAD W/O   Final Result         1.  No acute intracranial abnormality is identified, there are nonspecific white matter changes, commonly associated with small vessel ischemic disease.  Associated mild cerebral atrophy is noted.   2.  Atherosclerosis.         DX-HIP-UNILATERAL-WITH PELVIS-1 VIEW LEFT   Final Result         1.  No radiographic evidence of acute traumatic injury.      DX-CHEST-PORTABLE (1 VIEW)   Final Result         1.  No acute cardiopulmonary disease.   2.  Cardiomegaly      US-RUQ    (Results Pending)           CXR shows no effusion or consolidation but possible pulmonary edema or interstitial lung disease.    EKG reviewed, sinus bradycardia, heart rate 50, QTc 428 ms.  Noted patient had sinus bradycardia in 2019, 2010.    CT scan showed a 4.2 ascending thoracic aortic aneurysm, no dissection.  Hepatic steatosis, diverticulosis, CAD, no nephrolithiasis noted.  CT head negative.    Assessment/Plan:  Justification for Admission Status  I anticipate this patient will require at least two midnights for appropriate medical management, necessitating inpatient admission because patient may have symptomatic bradycardia causing her repeated falls at " memory care.  Patient is at high risk for bleeding, given her age.    Patient will need a Telemetry bed on medicine service .  The need is secondary to symptomatic bradycardia.    * Symptomatic bradycardia- (present on admission)  Assessment & Plan  Patient's heart rate down to 47 in the ER  Patient lives in memory care  Brought in by daughter, reporting multiple falls this week, left flank pain  Agitation  - admit to telemetry  - I consulted and spoke with cardiologist as patient may need a pacemaker if appropriate.    Generalized abdominal pain- (present on admission)  Assessment & Plan  Unclear cause  CT abdomen pelvis did not show any abscess, inflammation or infection  Lipase 21, LFTs within normal ranges    Gallbladder appears to be distended, unclear if CBD is dilated.  I ordered a right upper quadrant ultrasound  Check bladder scan  Possible constipation, bowel regimen in place    High cholesterol- (present on admission)  Assessment & Plan  Continue home rosuvastatin    Hypertension- (present on admission)  Assessment & Plan  Continue home lisinopril avoid beta-blockers    Moderate vascular dementia with psychotic disturbance (HCC)- (present on admission)  Assessment & Plan  Chronic  Continue home Prozac and Seroquel  Hold Atarax as needed        VTE prophylaxis: SCDs/TEDs    Total time spent on admission 76 minutes.    This included my review with ER physician, review of ED events, patient's history, outside records, recent records, face to face interview, physical examination; my review of lab results (CBC, chemistry panel), imaging review (CXR) and ECG. Also includes counseling patient on admission, treatment plan, and documentation of encounter.

## 2024-05-04 NOTE — ASSESSMENT & PLAN NOTE
Patient's heart rate down to 47 in the ER  Patient lives in memory care  Brought in by daughter, reporting multiple falls this week, left flank pain  Agitation  - admit to telemetry  - I consulted and spoke with cardiologist as patient may need a pacemaker if appropriate.

## 2024-05-04 NOTE — PROGRESS NOTES
4 Eyes Skin Assessment Completed by DWAYNE Emerson and Lucinda, LINCOLN    Head WDL  Ears WDL  Nose WDL  Mouth WDL  Neck WDL  Breast/Chest WDL  Shoulder Blades WDL  Spine WDL  (R) Arm/Elbow/Hand WDL  (L) Arm/Elbow/Hand WDL  Abdomen Bruising  Groin WDL  Scrotum/Coccyx/Buttocks Redness and Blanching  (R) Leg WDL  (L) Leg WDL  (R) Heel/Foot/Toe WDL  (L) Heel/Foot/Toe WDL      -Sacrum is red and blanching, legs are dry and flakey, scattered bruising. Skin is intact, no areas of concern.    Devices In Places Tele Box, Blood Pressure Cuff, and Pulse Ox      Interventions In Place Pressure Redistribution Mattress    Possible Skin Injury No    Pictures Uploaded Into Epic N/A  Wound Consult Placed N/A  RN Wound Prevention Protocol Ordered No

## 2024-05-04 NOTE — ED TRIAGE NOTES
"Chief Complaint   Patient presents with    Flank Pain     L flank tenderness x48 hours    Abdominal Pain     X1 day     Pt to triage via WC. Per daughter, pt has had multiple falls this week and has had multiple falls this week and recently started having flank pain the past two days. Pt's daughter states she has been guarding her abdomen as well all night and unable to sleep.     Pt lives in [facility] and A+Ox1-2 at baseline.    Protocol ordered. Placed at phlebotomy. Educated on triage process. Encouraged to alert staff to any changes.     /77   Pulse (!) 49   Temp 36 °C (96.8 °F) (Temporal)   Resp 17   Ht 1.727 m (5' 8\")   Wt 66.7 kg (147 lb)   SpO2 97%   BMI 22.35 kg/m²     "

## 2024-05-04 NOTE — PROGRESS NOTES
Patient up to unit from ED. Patient is A&Ox1, VSS, no signs of distress. Tele monitor placed and verified by monitor room, family at bedside. All questions and concerns answered, call light in reach, bed alarm on, plan of care ongoing.

## 2024-05-04 NOTE — ED NOTES
Med Rec completed per patient's MAR   Allergies reviewed  No ORAL antibiotics in last 30 days    Patient is not taking anticoagulants

## 2024-05-04 NOTE — ED NOTES
Pt and daughter updated on POC, waiting for bed assignment. Requesting hospital bed. House bed ordered.

## 2024-05-04 NOTE — PROGRESS NOTES
Notified by monitor tech that patient's heart rate was down to 39. Dr. Tucker notified, no further orders at this time.

## 2024-05-04 NOTE — ASSESSMENT & PLAN NOTE
Continue home rosuvastatin   Writer called patient at this time to further discuss G7 sensor. No response. LVM to call back clinic

## 2024-05-04 NOTE — ED PROVIDER NOTES
ED Provider Note    Scribed for Mitra Walters M.D. by Naida Bowen. 5/4/2024, 4:06 AM.    Primary care provider: Blake Del Angel M.D.  Means of arrival: Wheelchair  History obtained from: Daughter  History limited by: Chronic Underlying Condition - Dementia    CHIEF COMPLAINT  Chief Complaint   Patient presents with    Flank Pain     L flank tenderness x48 hours    Abdominal Pain     X1 day       HPI/ROS  Michelle Moon is a 78 y.o. female, with a history of dementia, who presents to the Emergency Department for evaluation of injuries sustained during several ground level falls over the last week.  Per daughter patient was hospitalized approximately 1 month ago after a fall and intracranial hemorrhage.  Since then patient has been residing at [facility] and daughter reported that she has lost balance and fallen 4-5 times over the last couple of weeks.  Patient does not recall the episodes, unclear if these were syncope versus mechanical ground-level falls.  Patient has been complaining of pain on her left side and wincing when lightly touched on her left hip and flank.  Therefore daughter brought her in for evaluation of this.  Last fall was yesterday at some time.  Per daughter, she has been unable to sleep secondary to the pain.  When asking the patient she denies any acute complaints.  In the room patient is noted to be bradycardic in the 40s, blood pressures within normal limits, daughter reports this is not normal for her.  No additional pain or symptoms noted at this time. No alleviating or exacerbating factors noted.    EXTERNAL RECORDS REVIEWED  Patient recently hospitalized on 3/4/2024 after falls resulting in subdural hematoma and temporal skull fracture.  She was managed nonoperatively and subsequently discharged back to her memory care unit.    LIMITATION TO HISTORY   Select: Underlying Medical Condition - Dementia    OUTSIDE HISTORIAN(S):  Family Daughter      PAST MEDICAL HISTORY   has a  past medical history of High cholesterol (3/4/2024), Hypertension (3/4/2024), Small intestinal hemorrhage not requiring more than four units of blood in 24 hours, admission to ICU, or surgery (2018), and Stomach ulcer.    SURGICAL HISTORY   has a past surgical history that includes hysterectomy laparoscopy (1994); septoplasty (4/26/2010); turbinate reduction (4/26/2010); septoplasty (10/7/2010); breast reduction; irrigation & debridement ortho (Right, 7/21/2015); hardware removal ortho (7/21/2015); irrigation & debridement ortho (Right, 7/23/2015); orif, patella (7/23/2015); gastroscopy-endo (6/16/2018); colonoscopy - endo (N/A, 6/18/2018); and orif, ankle (Right, 1/22/2019).    SOCIAL HISTORY  Social History     Tobacco Use    Smoking status: Never    Smokeless tobacco: Never   Vaping Use    Vaping Use: Never used   Substance Use Topics    Alcohol use: Yes     Comment: 3-4 per week    Drug use: No      Social History     Substance and Sexual Activity   Drug Use No       FAMILY HISTORY  Family History   Problem Relation Age of Onset    Hypertension Other     Cancer Other        CURRENT MEDICATIONS  Home Medications       Reviewed by Luke Martinez R.N. (Registered Nurse) on 05/04/24 at 0300  Med List Status: Partial     Medication Last Dose Status   acetaminophen (TYLENOL) 325 MG Tab  Active   Cholecalciferol (VITAMIN D-3 SUPER STRENGTH) 2000 UNIT Tab  Active   denosumab (PROLIA) 60 MG/ML Solution  Active   FLUoxetine (PROZAC) 20 MG Cap  Active   GEMTESA 75 MG tablet  Active   hydrOXYzine HCl (ATARAX) 25 MG Tab  Active   lisinopril (PRINIVIL) 20 MG Tab  Active   Multiple Vitamins-Minerals (MULTIVITAMIN ADULTS) Tab  Active   omeprazole (PRILOSEC) 20 MG delayed-release capsule  Active   PROLIA 60 MG/ML Solution Prefilled Syringe injection  Active   QUEtiapine (SEROQUEL) 25 MG Tab  Active   rosuvastatin (CRESTOR) 20 MG Tab  Active                    ALLERGIES  Allergies   Allergen Reactions    Nkda [No Known Drug  "Allergy]        PHYSICAL EXAM  VITAL SIGNS: /76   Pulse (!) 47   Temp 36 °C (96.8 °F) (Temporal)   Resp 15   Ht 1.727 m (5' 8\")   Wt 66.7 kg (147 lb)   SpO2 96%   BMI 22.35 kg/m²   Vitals reviewed by myself.  Nursing note and vitals reviewed.  Constitutional: Well-developed and well-nourished. No acute distress.   HENT: Head is normocephalic and atraumatic.  Eyes: extra-ocular movements intact  Cardiovascular: Bradycardic rate and regular rhythm. No murmur heard.  Pulmonary/Chest: Breath sounds normal. No wheezes or rales.  Tender to palpation in the left lateral lower ribs  Abdominal: Soft and non-tender. No distention.    Musculoskeletal: Extremities exhibit normal range of motion without edema or tenderness.   Neurological: Awake and alert, cranial nerves II through XII intact, no focal neurologic deficits  Skin: Skin is warm and dry. No rash.     DIAGNOSTIC STUDIES:  LABS  Labs Reviewed   COMP METABOLIC PANEL - Abnormal; Notable for the following components:       Result Value    Creatinine 0.46 (*)     Total Protein 5.9 (*)     All other components within normal limits   URINALYSIS,CULTURE IF INDICATED - Abnormal; Notable for the following components:    Leukocyte Esterase Small (*)     All other components within normal limits   URINE MICROSCOPIC (W/UA) - Abnormal; Notable for the following components:    WBC 5-10 (*)     All other components within normal limits   CBC WITH DIFFERENTIAL   LIPASE   ESTIMATED GFR   TROPONIN       All labs reviewed and independently interpreted by myself    EKG Interpretation:    12 Lead EKG independently interpreted by myself to show:  EKG at 3:47 AM: Sinus bradycardia at a rate of 50, normal axis, normal intervals, , QRS 91, QTc 428, no acute ST-T segment changes, no evidence of acute arrythmia or ischemia per my independent interpretation    RADIOLOGY  Images independently interpreted by myself prior to radiologist review:  -CT head demonstrates no acute " intracranial chest x-ray demonstrates no lobar pneumonia, left hip x-ray demonstrates no acute findings    Final interpretation by radiology demonstrates:    CT-CHEST,ABDOMEN,PELVIS WITH   Final Result         1.  4.2 cm ascending thoracic aortic aneurysm, radiographic follow-up and surveillance recommended as clinically appropriate.   2.  Changes of hepatic steatosis   3.  Diverticulosis   4.  Atherosclerosis and atherosclerotic coronary artery disease      CT-HEAD W/O   Final Result         1.  No acute intracranial abnormality is identified, there are nonspecific white matter changes, commonly associated with small vessel ischemic disease.  Associated mild cerebral atrophy is noted.   2.  Atherosclerosis.         DX-HIP-UNILATERAL-WITH PELVIS-1 VIEW LEFT   Final Result         1.  No radiographic evidence of acute traumatic injury.      DX-CHEST-PORTABLE (1 VIEW)   Final Result         1.  No acute cardiopulmonary disease.   2.  Cardiomegaly        The radiologist's interpretation of all radiological studies have been reviewed by me.      COURSE & MEDICAL DECISION MAKING    ED OBS: No; Patient does not meet criteria for ED Observation.     INITIAL ASSESSMENT, ED COURSE AND PLAN    Patient is a 78-year-old female who presents for evaluation of falls frequent falls over the last few days with associated left hip and flank pain.  Differential diagnose includes syncope, arrhythmia, electrolyte derangement, symptomatic bradycardia, dehydration, electrolyte derangement,  traumatic injury, rib fracture, retroperitoneal hematoma, hip injury.  Diagnostic workup includes labs, hip x-ray and CT of the chest, abdomen, pelvis and head.    Patient initial vitals are within normal limits.  EKG returns and demonstrates no evidence of acute arrhythmia or ischemia, she does have sinus bradycardia, unclear if this is symptomatic, currently patient is not having any dizziness or lightheadedness, blood pressures within normal limits.   Will continue to monitor.  Labs returned and are unremarkable.  Troponin is within normal limits, no chest pain, EKG shows no heart strain, low suspicion for arrhythmia or ischemia.  Urinalysis demonstrates no signs of infection.  Electrolytes and renal function are within normal limits.  CT imaging returns and demonstrates no acute traumatic injury, she does have an ascending thoracic aortic aneurysm which I advised daughter will need to be monitored, at this time no need for acute intervention.  CT of the head is negative for acute findings.  Left hip x-ray and chest x-ray negative for acute traumatic injuries.  At this time unclear if patient is having mechanical falls versus syncope and therefore we will hospitalize her for further syncope workup.  Case discussed with Dr. Tucker who has accepted patient for hospitalization.  Patient is in guarded condition.       REASSESSMENTS   5:44 AM - Patient seen and examined at bedside. Ordered for labs and radiology to evaluate her symptoms. Discussed plan of care. Patient and daughter agree to the plan.      5:29 AM - Upon reassessment, patient is resting comfortably with stable vital signs. No new complaints at this time. Discussed x-ray results with patient and daughter. Currently awaiting CT and lab results.     8:28 AM I discussed the patient's case and the above findings with Dr. Tucker (Hospitalist) who agreed to admit the patient.     8:19 AM - Nurse informed me that patient is experienced increased agitation. She will be treated with Atarax 25 mg.    8:29 AM - Patient was reevaluated at bedside. She continues to sit comfortably in bed with stable vital signs. No new complaints at this time. Discussed results with patient and daughter including plan to admit for syncope workup. They are agreeable to this plan.    DISPOSITION AND DISCUSSIONS  I have discussed management of the patient with the following physicians and CINTHIA's:  Dr. Tucker    Discussion of management  with other [facility] or appropriate source(s): None     Escalation of care considered, and ultimately not performed: None    Barriers to care at this time, including but not limited to:  Chronic medical condition of dementia .     Decision tools and prescription drugs considered including, but not limited to:  None .    DISPOSITION:  Patient will be hospitalized by Dr. Tucker in guarded condition.    FINAL IMPRESSION  1. Frequent falls    2. Bradycardia          Naida SANTANA (Scribe), am scribing for, and in the presence of, Mitra Walters M.D..    Electronically signed by: Naida Bowen (Lizethibsindhu), 5/4/2024    Mitra SANTANA M.D. personally performed the services described in this documentation, as scribed by Naida Bowen in my presence, and it is both accurate and complete.    The note accurately reflects work and decisions made by me.  Mitra Walters M.D.  5/4/2024  10:18 AM

## 2024-05-04 NOTE — ASSESSMENT & PLAN NOTE
Unclear cause  CT abdomen pelvis did not show any abscess, inflammation or infection  Lipase 21, LFTs within normal ranges    Gallbladder appears to be distended, unclear if CBD is dilated.  I ordered a right upper quadrant ultrasound  Check bladder scan  Possible constipation, bowel regimen in place

## 2024-05-05 ENCOUNTER — APPOINTMENT (OUTPATIENT)
Dept: CARDIOLOGY | Facility: MEDICAL CENTER | Age: 79
DRG: 310 | End: 2024-05-05
Attending: INTERNAL MEDICINE
Payer: MEDICARE

## 2024-05-05 LAB
ANION GAP SERPL CALC-SCNC: 10 MMOL/L (ref 7–16)
BUN SERPL-MCNC: 15 MG/DL (ref 8–22)
CALCIUM SERPL-MCNC: 9.5 MG/DL (ref 8.5–10.5)
CHLORIDE SERPL-SCNC: 107 MMOL/L (ref 96–112)
CO2 SERPL-SCNC: 22 MMOL/L (ref 20–33)
CREAT SERPL-MCNC: 0.5 MG/DL (ref 0.5–1.4)
ERYTHROCYTE [DISTWIDTH] IN BLOOD BY AUTOMATED COUNT: 45.7 FL (ref 35.9–50)
GFR SERPLBLD CREATININE-BSD FMLA CKD-EPI: 97 ML/MIN/1.73 M 2
GLUCOSE SERPL-MCNC: 108 MG/DL (ref 65–99)
HCT VFR BLD AUTO: 38.2 % (ref 37–47)
HGB BLD-MCNC: 12.7 G/DL (ref 12–16)
LV EJECT FRACT  99904: 60
MAGNESIUM SERPL-MCNC: 2.1 MG/DL (ref 1.5–2.5)
MCH RBC QN AUTO: 29 PG (ref 27–33)
MCHC RBC AUTO-ENTMCNC: 33.2 G/DL (ref 32.2–35.5)
MCV RBC AUTO: 87.2 FL (ref 81.4–97.8)
PLATELET # BLD AUTO: 225 K/UL (ref 164–446)
PMV BLD AUTO: 9.9 FL (ref 9–12.9)
POTASSIUM SERPL-SCNC: 4.3 MMOL/L (ref 3.6–5.5)
RBC # BLD AUTO: 4.38 M/UL (ref 4.2–5.4)
SODIUM SERPL-SCNC: 139 MMOL/L (ref 135–145)
WBC # BLD AUTO: 6.2 K/UL (ref 4.8–10.8)

## 2024-05-05 PROCEDURE — 93306 TTE W/DOPPLER COMPLETE: CPT | Mod: 26 | Performed by: INTERNAL MEDICINE

## 2024-05-05 PROCEDURE — 99233 SBSQ HOSP IP/OBS HIGH 50: CPT | Performed by: STUDENT IN AN ORGANIZED HEALTH CARE EDUCATION/TRAINING PROGRAM

## 2024-05-05 PROCEDURE — 99232 SBSQ HOSP IP/OBS MODERATE 35: CPT | Mod: FS | Performed by: INTERNAL MEDICINE

## 2024-05-05 RX ORDER — ALPRAZOLAM 0.25 MG/1
0.25 TABLET ORAL 3 TIMES DAILY PRN
Status: DISCONTINUED | OUTPATIENT
Start: 2024-05-05 | End: 2024-05-06 | Stop reason: HOSPADM

## 2024-05-05 RX ADMIN — QUETIAPINE FUMARATE 50 MG: 25 TABLET ORAL at 16:38

## 2024-05-05 RX ADMIN — ALPRAZOLAM 0.25 MG: 0.25 TABLET ORAL at 20:18

## 2024-05-05 RX ADMIN — SENNOSIDES AND DOCUSATE SODIUM 2 TABLET: 50; 8.6 TABLET ORAL at 05:54

## 2024-05-05 RX ADMIN — SENNOSIDES AND DOCUSATE SODIUM 2 TABLET: 50; 8.6 TABLET ORAL at 16:38

## 2024-05-05 RX ADMIN — ACETAMINOPHEN 650 MG: 325 TABLET, FILM COATED ORAL at 06:08

## 2024-05-05 RX ADMIN — HEPARIN SODIUM 5000 UNITS: 5000 INJECTION, SOLUTION INTRAVENOUS; SUBCUTANEOUS at 21:58

## 2024-05-05 RX ADMIN — FLUOXETINE HYDROCHLORIDE 20 MG: 20 CAPSULE ORAL at 05:55

## 2024-05-05 RX ADMIN — OMEPRAZOLE 20 MG: 20 CAPSULE, DELAYED RELEASE ORAL at 05:54

## 2024-05-05 RX ADMIN — HEPARIN SODIUM 5000 UNITS: 5000 INJECTION, SOLUTION INTRAVENOUS; SUBCUTANEOUS at 05:54

## 2024-05-05 RX ADMIN — ALPRAZOLAM 0.25 MG: 0.25 TABLET ORAL at 11:59

## 2024-05-05 RX ADMIN — LISINOPRIL 20 MG: 20 TABLET ORAL at 05:55

## 2024-05-05 RX ADMIN — HYDROXYZINE HYDROCHLORIDE 25 MG: 25 TABLET, FILM COATED ORAL at 16:38

## 2024-05-05 RX ADMIN — ACETAMINOPHEN 650 MG: 325 TABLET, FILM COATED ORAL at 12:36

## 2024-05-05 RX ADMIN — ACETAMINOPHEN 650 MG: 325 TABLET, FILM COATED ORAL at 20:18

## 2024-05-05 RX ADMIN — ROSUVASTATIN CALCIUM 20 MG: 20 TABLET, FILM COATED ORAL at 20:18

## 2024-05-05 ASSESSMENT — PAIN DESCRIPTION - PAIN TYPE
TYPE: ACUTE PAIN
TYPE: ACUTE PAIN

## 2024-05-05 ASSESSMENT — COGNITIVE AND FUNCTIONAL STATUS - GENERAL
MOVING FROM LYING ON BACK TO SITTING ON SIDE OF FLAT BED: A LITTLE
MOVING TO AND FROM BED TO CHAIR: A LITTLE
WALKING IN HOSPITAL ROOM: A LITTLE
SUGGESTED CMS G CODE MODIFIER MOBILITY: CK
STANDING UP FROM CHAIR USING ARMS: A LITTLE
CLIMB 3 TO 5 STEPS WITH RAILING: A LOT
MOBILITY SCORE: 17
TURNING FROM BACK TO SIDE WHILE IN FLAT BAD: A LITTLE

## 2024-05-05 ASSESSMENT — GAIT ASSESSMENTS
ASSISTIVE DEVICE: FRONT WHEEL WALKER
GAIT LEVEL OF ASSIST: MINIMAL ASSIST
DEVIATION: BRADYKINETIC;SHUFFLED GAIT;OTHER (COMMENT)
DISTANCE (FEET): 40

## 2024-05-05 ASSESSMENT — FIBROSIS 4 INDEX: FIB4 SCORE: 1.54

## 2024-05-05 NOTE — PROGRESS NOTES
Monitor summary:        Rhythm: SR, SB  Rate: 56-63  Ectopy: (R) PAC, (F) PAC  Measurements: .20/.06/.43        12hr chart check

## 2024-05-05 NOTE — CARE PLAN
The patient is Stable - Low risk of patient condition declining or worsening    Shift Goals  Clinical Goals: hemodynamic stablity  Patient Goals: go home  Family Goals: go home, updates    Progress made toward(s) clinical / shift goals:    Problem: Pain - Standard  Goal: Alleviation of pain or a reduction in pain to the patient’s comfort goal  Outcome: Progressing  Pt had no c/o pain throughout shift   Problem: Knowledge Deficit - Standard  Goal: Patient and family/care givers will demonstrate understanding of plan of care, disease process/condition, diagnostic tests and medications  Outcome: Progressing   Pt reoriented throughout shift to environment and care plan, daughter is present at the bedside and verbalizes understanding of care plan  Problem: Fall Risk  Goal: Patient will remain free from falls  Outcome: Progressing   Pt had no falls throughout shift, daughter calls appropriately when pt requires ambulation as a x2 assist; fall precautions in place  Problem: Skin Integrity  Goal: Skin integrity is maintained or improved  Outcome: Progressing   No significant skin abnormalities noted throughout shift    Patient is not progressing towards the following goals:

## 2024-05-05 NOTE — THERAPY
Physical Therapy   Initial Evaluation     Patient Name: Michelle Moon  Age:  78 y.o., Sex:  female  Medical Record #: [id number]  Today's Date: 5/5/2024     Precautions  Precautions: Fall Risk;Swallow Precautions;Other (See Comments)  Comments: Baseline dementia    Assessment  Patient is 78 y.o. female presenting for L flank pain and recurrent falls w/ a PMH of dementia, recent SAH in March, and multiple R knee surgeries following trauma c/b infection.  Per the pt's dtrAngela (present throughout evaluation), the pt recently moved to a memory care facility about 1mo ago where she had SPV during ambulation using FWW and receiving HH PT 1x/wk, however began having increasing falls d/t impulsive behaviors and not using AD, thus has been limited to using a WC for the past week (see flowsheet for home set-up and PLOF).     Currently, the pt displays gross reduction in BLE ROM, reduced overall BLE strength, poor balance and gait stability limiting her independence w/ functional mobility tasks and increasing her risk of future falls.  She was able to demo supine<>sit EOB SBA using bed rails w/ HOB elevated, as well as STS EOB w/ FWW CGA.  She completed gait 40' using FWW Rd for posterior support and AD management via slow agatha, shuffled gait pattern, and excessive trunk flexion w/ no LOB observed and distance limited by weakness/fatigue.  Provided pt and her dtr edu re: role of PT in HH setting, as well as proper use of FWW w/ good understanding verbalized by the pt's dtrAngela.  Recommend DC back to memory care given Rd available for mobility tasks, and continued HH for further PT needs. Will follow for acute PT needs.     Plan    Physical Therapy Initial Treatment Plan   Treatment Plan : Bed Mobility, Equipment, Gait Training, Neuro Re-Education / Balance, Self Care / Home Evaluation, Stair Training, Therapeutic Activities, Therapeutic Exercise  Treatment Frequency: 3 Times per Week  Duration: Until Therapy  Goals Met    DC Equipment Recommendations: Unable to determine at this time  Discharge Recommendations: Recommend home health for continued physical therapy services       Subjective/Objective       05/05/24 1258   Prior Living Situation   Prior Services Continuous (24 Hour) Care Giving Per Service;Skilled Home Health Services   Housing / Facility Other (Comments)  (Memory care facility)   Steps Into Home 0   Steps In Home 0   Equipment Owned Front-Wheel Walker;Wheelchair   Lives with - Patient's Self Care Capacity Attendant / Paid Care Giver   Comments Per pt's dtr, Angela (present throughout), the pt recently moved into a memory care facility within the last month.  She was having increasing falls d/t impulsive ambulation w/o assistance or AD, and has been limited to her WC for the past week or so.   Prior Level of Functional Mobility   Bed Mobility Independent   Transfer Status Required Assist   Ambulation Required Assist   Ambulation Distance Household distances   Assistive Devices Used Front-Wheel Walker   Wheelchair Required Assist   History of Falls   History of Falls Yes   Date of Last Fall   (Recurring falls, reason for admit)   Cognition    Cognition / Consciousness X   Level of Consciousness Alert   Comments pt has baseline dementia, however very pleasant and cooperative at this time   Passive ROM Lower Body   Passive ROM Lower Body X   Comments Knee extension limited in RLE d/t joint stiffness   Active ROM Lower Body    Active ROM Lower Body  X   Comments B knee and hip extension limited by weakness   Strength Lower Body   Lower Body Strength  X   Comments Grossly 4-/5 BLE   Sensation Lower Body   Lower Extremity Sensation   WDL   Comments pt denies numbness/tingling in LE's at this time   Coordination Lower Body    Coordination Lower Body  WDL   Other Treatments   Other Treatments Provided Provided pt and her dtr edu re: role of PT in HH setting, as well as proper use of FWW   Balance Assessment    Sitting Balance (Static) Fair -   Sitting Balance (Dynamic) Fair -   Standing Balance (Static) Poor +   Standing Balance (Dynamic) Poor +   Weight Shift Sitting Fair   Weight Shift Standing Poor   Comments stand w/ FWW   Bed Mobility    Supine to Sit Standby Assist   Sit to Supine Standby Assist   Scooting Standby Assist   Comments HOB elevated, use of bed rails   Gait Analysis   Gait Level Of Assist Minimal Assist   Assistive Device Front Wheel Walker   Distance (Feet) 40   # of Times Distance was Traveled 1   Deviation Bradykinetic;Shuffled Gait;Other (Comment)  (excessive trunk flexion)   Weight Bearing Status no restrictions   Vision Deficits Impacting Mobility pt denies   Comments distance limited by weakness/fatigue   Functional Mobility   Sit to Stand Contact Guard Assist   Bed, Chair, Wheelchair Transfer Minimal Assist   Transfer Method Stand Step   Mobility STS EOB w/ FWW; EOB<>doorway w/ FWW   Activity Tolerance   Sitting Edge of Bed 10min   Standing 6min   Edema / Skin Assessment   Edema / Skin  Not Assessed   Short Term Goals    Short Term Goal # 1 Pt will demo supine<>sit EOB spv w/ HOB flat and no rails in 6 visits for independence w/ bed mobility tasks   Short Term Goal # 2 Pt will demo stand-step txr EOB<>WC w/ no AD SPV in 6 visits for independence w/ functional transfers.   Short Term Goal # 3 Pt will demo gait >100' using FWW SPV in a moving environment in 6 visits for household ambulation.   Education Group   Education Provided Role of Physical Therapist;Use of Assistive Device   Role of Physical Therapist Patient Response Patient;Family;Acceptance;Explanation;Demonstration;Action Demonstration;Verbal Demonstration   Use of Assistive Device Patient Response Patient;Family;Acceptance;Explanation;Demonstration;Verbal Demonstration;Action Demonstration   Additional Comments pt and pt's dtr both receptive of edu provided   Problem List    Problems Pain;Impaired Bed Mobility;Impaired  Transfers;Impaired Ambulation;Functional ROM Deficit;Functional Strength Deficit;Impaired Balance;Decreased Activity Tolerance;Safety Awareness Deficits / Cognition   Interdisciplinary Plan of Care Collaboration   IDT Collaboration with  Nursing;Family / Caregiver   Patient Position at End of Therapy In Bed;Bed Alarm On;Call Light within Reach;Tray Table within Reach;Phone within Reach;Family / Friend in Room  (Pt's Angela jacobs, present throughout)   Collaboration Comments regarding outcome of tx session   Session Information   Date / Session Number  5/5- 1 (1/3, 5/11)

## 2024-05-05 NOTE — CARE PLAN
The patient is Stable - Low risk of patient condition declining or worsening    Shift Goals  Clinical Goals: Pt will mobilize throughout the shift  Patient Goals: go home  Family Goals: go home    Progress made toward(s) clinical / shift goals:  Mobilization encouraged up to bathroom and commode. Pt worked with therapy. Tylenol provided prn for back pain.     Patient is not progressing towards the following goals:    N/A

## 2024-05-05 NOTE — PROGRESS NOTES
Cardiology Follow Up Progress Note    Date of Service  5/5/2024    Attending Physician  Gian Joiner M.D.    Chief Complaint   Bradycardia    HPI  Isabell Moon is a 78 y.o. female admitted 5/4/2024 with several ground-level falls over the last week.  Patient resides in memory care.  She was brought in by her daughter.  She also complains of left flank pain.  She was noted to have bradycardia.  BP within normal limits.  Records revealed long-term bradycardia.    PMH: Recent hospitalization 3/24 for intracranial hemorrhage secondary to a fall, dementia, hypertension, hyperlipidemia    Interim Events  No overnight cardiac events  Telemetry-SB/SR, heart rate 58-89  SBP~110  Labs reviewed  CBC and BMP in excellent order aside from glucose 108  Echocardiogram LVEF 60% no wall motion abnormalities    Review of Systems  Review of Systems   Unable to perform ROS: Dementia       Vital signs in last 24 hours  Temp:  [36.1 °C (97 °F)-36.6 °C (97.9 °F)] 36.6 °C (97.9 °F)  Pulse:  [56-72] 63  Resp:  [16-18] 18  BP: (108-145)/(50-88) 110/66  SpO2:  [91 %-95 %] 91 %    Physical Exam  Physical Exam  Cardiovascular:      Rate and Rhythm: Normal rate and regular rhythm.      Pulses: Normal pulses.   Pulmonary:      Effort: Pulmonary effort is normal.   Skin:     General: Skin is warm.   Neurological:      Mental Status: Mental status is at baseline.         Lab Review  Lab Results   Component Value Date/Time    WBC 6.2 05/05/2024 08:16 AM    RBC 4.38 05/05/2024 08:16 AM    HEMOGLOBIN 12.7 05/05/2024 08:16 AM    HEMATOCRIT 38.2 05/05/2024 08:16 AM    MCV 87.2 05/05/2024 08:16 AM    MCH 29.0 05/05/2024 08:16 AM    MCHC 33.2 05/05/2024 08:16 AM    MPV 9.9 05/05/2024 08:16 AM      Lab Results   Component Value Date/Time    SODIUM 139 05/05/2024 08:16 AM    POTASSIUM 4.3 05/05/2024 08:16 AM    CHLORIDE 107 05/05/2024 08:16 AM    CO2 22 05/05/2024 08:16 AM    GLUCOSE 108 (H) 05/05/2024 08:16 AM    BUN 15 05/05/2024 08:16 AM     "CREATININE 0.50 05/05/2024 08:16 AM      Lab Results   Component Value Date/Time    ASTSGOT 16 05/04/2024 03:55 AM    ALTSGPT 11 05/04/2024 03:55 AM     Lab Results   Component Value Date/Time    CHOLSTRLTOT 235 (H) 06/14/2023 11:48 AM     (H) 06/14/2023 11:48 AM    HDL 72 06/14/2023 11:48 AM    TRIGLYCERIDE 117 06/14/2023 11:48 AM    TROPONINT 8 05/04/2024 03:55 AM       No results for input(s): \"NTPROBNP\" in the last 72 hours.    Cardiac Imaging and Procedures Review  EKG:  SB, abnormal R wave progression    Echocardiogram:    Normal left ventricular systolic function.  The left ventricular ejection fraction is visually estimated to be 60%.  Aortic valve sclerosis without stenosis.  Normal right ventricular size and systolic function.  Unable to estimate pulmonary artery pressure due to an inadequate   tricuspid regurgitant jet.  The ascending aorta is borderline dilated with a diameter of 3.9 cm.  No prior study is available for comparison.       Imaging  Chest X-Ray:       1.  No acute cardiopulmonary disease.  2.  Cardiomegaly  Stress Test:      Assessment/Plan    # Recurrent falls  # Bradycardia  # Hypertension  # Hyperlipidemia    -Continue telemetry monitoring.  -No overnight cardiac events.   -Telemetry currently showing heart rates of 58-89.  -Echocardiogram reassuring LVEF 60%.  No regional wall motion abnormalities.  -Plan for Zio x 14 days at discharge.    Close follow-up in cardiology office, will arrange.  No further cardiac recommendations.  Cardiology will sign off.    I personally spent a total of 15 minutes which includes face-to-face time and non-face-to-face time spent on preparing to see the patient, reviewing hospital notes and tests, obtaining history from the patient, performing a medically appropriate exam, counseling and educating the patient, ordering medications/tests/procedures/referrals as clinically indicated, and documenting information in the electronic medical record. "       Thank you for allowing me to participate in the care of this patient.  Cardiology will sign off on this patient    Please contact me with any questions.    VALARIE Sanchez.   Cardiologist, [facility]  [phone]

## 2024-05-05 NOTE — THERAPY
Infectious Disease Physical Therapy Contact Note    Patient Name: Michelle Moon  Age:  78 y.o., Sex:  female  Medical Record #: [id number]  Today's Date: 5/5/2024    PT consult received.  Per EMR, pt pending cardiology f/u for potential pacemaker placement as early as Monday (tomorrow).  Will hold and f/u as further POC is established.    Braden Thacker PT, DPT [id number]

## 2024-05-06 ENCOUNTER — PHARMACY VISIT (OUTPATIENT)
Dept: PHARMACY | Facility: MEDICAL CENTER | Age: 79
End: 2024-05-06
Payer: COMMERCIAL

## 2024-05-06 ENCOUNTER — PATIENT OUTREACH (OUTPATIENT)
Dept: SCHEDULING | Facility: IMAGING CENTER | Age: 79
End: 2024-05-06
Payer: MEDICARE

## 2024-05-06 VITALS
RESPIRATION RATE: 20 BRPM | HEIGHT: 68 IN | TEMPERATURE: 97.9 F | HEART RATE: 67 BPM | DIASTOLIC BLOOD PRESSURE: 60 MMHG | BODY MASS INDEX: 20.35 KG/M2 | SYSTOLIC BLOOD PRESSURE: 110 MMHG | OXYGEN SATURATION: 93 % | WEIGHT: 134.26 LBS

## 2024-05-06 PROCEDURE — 99239 HOSP IP/OBS DSCHRG MGMT >30: CPT | Performed by: STUDENT IN AN ORGANIZED HEALTH CARE EDUCATION/TRAINING PROGRAM

## 2024-05-06 PROCEDURE — RXMED WILLOW AMBULATORY MEDICATION CHARGE: Performed by: INTERNAL MEDICINE

## 2024-05-06 RX ORDER — HALOPERIDOL 2 MG/ML
SOLUTION ORAL
Qty: 30 ML | Refills: 0 | OUTPATIENT
Start: 2024-05-06

## 2024-05-06 RX ORDER — LORAZEPAM 2 MG/ML
CONCENTRATE ORAL
Qty: 30 ML | Refills: 0 | OUTPATIENT
Start: 2024-05-06

## 2024-05-06 RX ORDER — AMOXICILLIN 250 MG
1 CAPSULE ORAL
Qty: 15 TABLET | Refills: 0 | OUTPATIENT
Start: 2024-05-06

## 2024-05-06 RX ORDER — PROMETHAZINE HYDROCHLORIDE 25 MG/1
TABLET ORAL
Qty: 12 TABLET | Refills: 0 | OUTPATIENT
Start: 2024-05-06

## 2024-05-06 RX ORDER — MORPHINE SULFATE 20 MG/ML
SOLUTION ORAL
Qty: 30 ML | Refills: 0 | OUTPATIENT
Start: 2024-05-06

## 2024-05-06 RX ORDER — ENOXAPARIN SODIUM 100 MG/ML
40 INJECTION SUBCUTANEOUS DAILY
Status: DISCONTINUED | OUTPATIENT
Start: 2024-05-06 | End: 2024-05-06 | Stop reason: HOSPADM

## 2024-05-06 RX ADMIN — ALPRAZOLAM 0.25 MG: 0.25 TABLET ORAL at 17:09

## 2024-05-06 RX ADMIN — LISINOPRIL 20 MG: 20 TABLET ORAL at 04:49

## 2024-05-06 RX ADMIN — HYDROXYZINE HYDROCHLORIDE 25 MG: 25 TABLET, FILM COATED ORAL at 13:01

## 2024-05-06 RX ADMIN — ALPRAZOLAM 0.25 MG: 0.25 TABLET ORAL at 10:08

## 2024-05-06 RX ADMIN — FLUOXETINE HYDROCHLORIDE 20 MG: 20 CAPSULE ORAL at 04:49

## 2024-05-06 RX ADMIN — ACETAMINOPHEN 650 MG: 325 TABLET, FILM COATED ORAL at 13:01

## 2024-05-06 RX ADMIN — OMEPRAZOLE 20 MG: 20 CAPSULE, DELAYED RELEASE ORAL at 04:49

## 2024-05-06 ASSESSMENT — FIBROSIS 4 INDEX: FIB4 SCORE: 1.63

## 2024-05-06 ASSESSMENT — PAIN DESCRIPTION - PAIN TYPE: TYPE: ACUTE PAIN

## 2024-05-06 NOTE — PROGRESS NOTES
Hospital Medicine Daily Progress Note    Date of Service  5/5/2024    Chief Complaint  Michelle Moon is a 78 y.o. female admitted 5/4/2024 with abdominal pain.    Hospital Course  Michelle Moon is a 78 y.o. female who presented 5/4/2024 with falls and abdominal pain.  Patient lives in memory care. Family not at bedside.  Brought in by daughter, reporting multiple falls this week, left flank pain  Agitation.  Patient stated she had left flank pain but felt that this was muscle spasm.     I discussed with EDP, difficult to discern if patient's repeat falls is from symptomatic bradycardia.  Current workup for an infection has been negative.  CT chest, abdomen pelvis did not show any source of infection for patient's abdominal pain complaints.    Interval Problem Update  5/5  Examined in room patient room, afebrile pulse in the 50s to 80s respiratory rate 14-20 systolic blood pressure  pulse ox 9095% on room air.  CBC unremarkable, BMP glucose 108, TTE normal LVEF.  She has no acute complaints, pleasant mood.  Discussed with daughter at bedside, patient lives in a assisted living facility that she feels is unable to adequately care for her, she is actively looking to move her into a place with higher level of care or obtain private bedside sitters and staying in her same location.  Either way daughter expressed desire to pursue hospice, patient has severe dementia, frequent and recurrent falls, hospice referral placed.  Adjust medications as needed to provide relief from any agitation, daughter is completely on board for patient to receive medications to increase comfort. Will address code status with her, I do not believe Full code is currently the status she would want right now, will clarify with her.  Cleared by cardio, no indication for PPM right now. Switched heparin to lovenox for dvt ppx.    I have discussed this patient's plan of care and discharge plan at IDT rounds today with Case Management,  Nursing, Nursing leadership, and other members of the IDT team.    Consultants/Specialty  cardiology    Code Status  Full Code    Disposition  The patient is not medically cleared for discharge to home or a post-acute facility.      I have placed the appropriate orders for post-discharge needs.    Review of Systems  ROS   Review of Systems   Gastrointestinal:  Positive for abdominal pain.   Musculoskeletal:  Positive for back pain    Physical Exam  Temp:  [36.1 °C (97 °F)-37 °C (98.6 °F)] 36.1 °C (97 °F)  Pulse:  [56-84] 68  Resp:  [14-20] 16  BP: ()/(61-91) 123/71  SpO2:  [90 %-95 %] 90 %    Physical Exam  Vitals and nursing note reviewed. Exam conducted with a chaperone present.   Constitutional:       General: She is not in acute distress.     Appearance: She is not toxic-appearing.   HENT:      Head: Normocephalic and atraumatic.      Nose: Nose normal. No rhinorrhea.      Mouth/Throat:      Mouth: Mucous membranes are dry.      Pharynx: Oropharynx is clear.   Eyes:      General: No scleral icterus.     Extraocular Movements: Extraocular movements intact.      Conjunctiva/sclera: Conjunctivae normal.   Cardiovascular:      Rate and Rhythm: Normal rate and regular rhythm.      Pulses: Normal pulses.      Heart sounds: No murmur heard.  Pulmonary:      Effort: No respiratory distress.      Breath sounds: No wheezing, rhonchi or rales.   Abdominal:      General: There is no distension.      Palpations: Abdomen is soft.      Tenderness: There is no abdominal tenderness. There is no guarding or rebound.   Musculoskeletal:         General: Normal range of motion.      Cervical back: Normal range of motion and neck supple. No rigidity.      Right lower leg: No edema.      Left lower leg: No edema.   Skin:     General: Skin is warm and dry.      Capillary Refill: Capillary refill takes less than 2 seconds.   Neurological:      General: No focal deficit present.      Mental Status: She is alert. Mental status is at  baseline. She is disoriented.      Cranial Nerves: No cranial nerve deficit.      Sensory: No sensory deficit.      Motor: No weakness.      Coordination: Coordination normal.   Psychiatric:         Mood and Affect: Mood normal.         Behavior: Behavior normal.         Thought Content: Thought content normal.         Judgment: Judgment normal.         Fluids    Intake/Output Summary (Last 24 hours) at 5/6/2024 0416  Last data filed at 5/6/2024 0348  Gross per 24 hour   Intake 360 ml   Output 1 ml   Net 359 ml       Laboratory  Recent Labs     05/04/24  0355 05/05/24  0816   WBC 6.7 6.2   RBC 4.32 4.38   HEMOGLOBIN 12.7 12.7   HEMATOCRIT 38.5 38.2   MCV 89.1 87.2   MCH 29.4 29.0   MCHC 33.0 33.2   RDW 46.9 45.7   PLATELETCT 238 225   MPV 10.2 9.9     Recent Labs     05/04/24  0355 05/05/24  0816   SODIUM 141 139   POTASSIUM 4.1 4.3   CHLORIDE 105 107   CO2 24 22   GLUCOSE 95 108*   BUN 12 15   CREATININE 0.46* 0.50   CALCIUM 9.1 9.5                   Imaging  EC-ECHOCARDIOGRAM COMPLETE W/O CONT   Final Result      US-RUQ   Final Result      1.  Probable small amount of sludge in the gallbladder.   2.  No evidence for acute cholecystitis.   3.  Common bile duct is top normal in size.   4.  Limited evaluation of pancreas and abdominal aorta.      CT-CHEST,ABDOMEN,PELVIS WITH   Final Result         1.  4.2 cm ascending thoracic aortic aneurysm, radiographic follow-up and surveillance recommended as clinically appropriate.   2.  Changes of hepatic steatosis   3.  Diverticulosis   4.  Atherosclerosis and atherosclerotic coronary artery disease      CT-HEAD W/O   Final Result         1.  No acute intracranial abnormality is identified, there are nonspecific white matter changes, commonly associated with small vessel ischemic disease.  Associated mild cerebral atrophy is noted.   2.  Atherosclerosis.         DX-HIP-UNILATERAL-WITH PELVIS-1 VIEW LEFT   Final Result         1.  No radiographic evidence of acute traumatic  injury.      DX-CHEST-PORTABLE (1 VIEW)   Final Result         1.  No acute cardiopulmonary disease.   2.  Cardiomegaly           Assessment/Plan  Generalized abdominal pain- (present on admission)  Assessment & Plan  Unclear cause  CT abdomen pelvis did not show any abscess, inflammation or infection  Lipase 21, LFTs within normal ranges    Gallbladder appears to be distended, unclear if CBD is dilated.  I ordered a right upper quadrant ultrasound  Check bladder scan  Possible constipation, bowel regimen in place    Bradycardia- (present on admission)  Assessment & Plan  Patient's heart rate down to 47 in the ER  Patient lives in memory care  Brought in by daughter, reporting multiple falls this week, left flank pain  Agitation  - admit to telemetry  - I consulted and spoke with cardiologist as patient may need a pacemaker if appropriate.    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 In review of yesterday's note (5/4/2024), there are no changes except as documented above.  Total time spent 54 minutes. I spent greater than 50% of the time for patient care, counseling, and coordination on this date, including unit/floor time, and face-to-face time with the patient as per interval events, my own review of patient's imaging and lab analysis and developing my assessment and plan above.

## 2024-05-06 NOTE — DISCHARGE PLANNING
Referral management team      Joint visit with Jaime OSMAN.   Spoke to family about hospice choice.   They are current [facility] patient and would like to continue with [facility].     [facility] has already spoke to family and consents have been signed.     Patient to move to new [facility] home today.   [address] [place] [address]  [phone]      Tali from [facility] will let me know time of DME delivery so we can coordinate transportation time.       [facility] asked for transport for 1700  [employer] sent transportation request

## 2024-05-06 NOTE — HOSPITAL COURSE
Michelle Moon is a 78 y.o. female who presented 5/4/2024 with falls and abdominal pain.  Patient lives in memory care. Family not at bedside.  Brought in by daughter, reporting multiple falls this week, left flank pain  Agitation.  Patient stated she had left flank pain but felt that this was muscle spasm.     I discussed with EDP, difficult to discern if patient's repeat falls is from symptomatic bradycardia.  Current workup for an infection has been negative.  CT chest, abdomen pelvis did not show any source of infection for patient's abdominal pain complaints.

## 2024-05-06 NOTE — PROGRESS NOTES
Bedside report received, assumed care of patient. Resting in bed, in some pain in her left flank area. A&O x1. Tele monitoring in place. Educated on fall risk, all fall precautions in place, daughter at bedside. Call light within reach, bed locked and in lowest position, assisted patient with the aid of her daughter to the commode.

## 2024-05-06 NOTE — CARE PLAN
The patient is Watcher - Medium risk of patient condition declining or worsening    Shift Goals  Clinical Goals: Promote safety; pain management; prevent skin breakdown  Patient Goals: discharge  Family Goals: discharge    Progress made toward(s) clinical / shift goals:    Problem: Pain - Standard  Goal: Alleviation of pain or a reduction in pain to the patient’s comfort goal  Outcome: Progressing     Problem: Fall Risk  Goal: Patient will remain free from falls  Outcome: Progressing     Problem: Skin Integrity  Goal: Skin integrity is maintained or improved  Outcome: Progressing     Problem: Hemodynamics  Goal: Patient's hemodynamics, fluid balance and neurologic status will be stable or improve  Outcome: Progressing     Problem: Respiratory  Goal: Patient will achieve/maintain optimum respiratory ventilation and gas exchange  Outcome: Progressing     Problem: Risk for Aspiration  Goal: Patient's risk for aspiration will be absent or decrease  Outcome: Progressing     Problem: Nutrition  Goal: Patient's nutritional and fluid intake will be adequate or improve  Outcome: Progressing     Problem: Urinary Elimination  Goal: Establish and maintain regular urinary output  Outcome: Progressing     Problem: Bowel Elimination  Goal: Establish and maintain regular bowel function  Outcome: Progressing     Problem: Mobility  Goal: Patient's capacity to carry out activities will improve  Outcome: Progressing       Patient is not progressing towards the following goals:

## 2024-05-06 NOTE — DISCHARGE PLANNING
DC Transport Scheduled    Transport Company Scheduled:  [facility]  Spoke with MAU at [facility] to schedule transport.      Scheduled Date: 5/6/2024  Scheduled Time: 1700    Destination: [address], Destination address: [facility] [address]    Notified care team of scheduled transport via [employer].     If there are any changes needed to the DC transportation scheduled, please contact [facility] at ext. [phone] between the hours of 7823-2589 Mon-Fri. If outside those hours, contact the ED Case Manager at ext. [phone].

## 2024-05-06 NOTE — DISCHARGE PLANNING
Case Management Discharge Planning    Admission Date: 5/4/2024  GMLOS: 1.8  ALOS: 2    6-Clicks ADL Score: 17  6-Clicks Mobility Score: 17  PT and/or OT Eval ordered: Yes  Post-acute Referrals Ordered: Yes  Post-acute Choice Obtained: Yes  Has referral(s) been sent to post-acute provider:  Yes      Anticipated Discharge Dispo: Discharge Disposition: D/T to hospice home (50)    DME Needed: No    Action(s) Taken: Updated Provider/Nurse on Discharge Plan and DC Assessment Complete (See below)    1045, Pt was visited at room. Pt oriented to person only. Pt's  Roland and daughter at room and they decided for Pt to discharge home with Hospice. Pt's daughter said her mother will be going to the [facility]. She provided the owner's name Dawn [phone]. Pt's daughter also said that they will be going for [facility].     Escalations Completed: None    Medically Clear: Yes medically cleared for Home with Hospice    Next Steps: CM will continue to assist Pt with discharge needs.      Barriers to Discharge: None      Care Transition Team Assessment  RN CM met with Pt at bedside to obtain assessment informations. Demographics verified. RN CM introduced self and purpose of visit.   Pt lives in [facility]  Pt has spouse, adult children and [facility] Staff for support.  Pt has JASMINE RAMIREZ M.D. for PCP  Pt needs assistance with ADLs prior to hospitalization.  Pt has walker.      Information Source  Orientation Level: Oriented to person, Disoriented to place, Disoriented to time, Disoriented to situation  Information Given By: Spouse  Informant's Name: Roland Moon    Elopement Risk  Legal Hold: No  Ambulatory or Self Mobile in Wheelchair: No-Not an Elopement Risk  Elopement Risk: Not at Risk for Elopement    Interdisciplinary Discharge Planning  Primary Care Physician: JASMINE RAMIREZ M.D.  Lives with - Patient's Self Care Capacity: Spouse, Adult Children  Patient or legal guardian wants to designate a  caregiver: No  Support Systems: Spouse / Significant Other, Family Member(s), Other (Comments) ([facility] staff)  Housing / Facility: Other (Comments), Assisted Living Residence  Name of Care Facility: [place]  Prior Services: Continuous (24 Hour) Care Giving Per Service, Skilled Home Health Services  Durable Medical Equipment: Walker    Discharge Preparedness  What is your plan after discharge?: Other (comment) (Home with Hospice)  What are your discharge supports?: Spouse, Child, Other (comment) ( caregivers)  Prior Functional Level: Needs Assist with Activities of Daily Living, Needs Assist with Medication Management, Uses Walker    Functional Assesment  Prior Functional Level: Needs Assist with Activities of Daily Living, Needs Assist with Medication Management, Uses Walker    Finances  Financial Barriers to Discharge: No  Prescription Coverage: Yes    Vision / Hearing Impairment  Vision Impairment : Yes  Hearing Impairment : No              Domestic Abuse  Have you ever been the victim of abuse or violence?: No  Physical Abuse or Sexual Abuse: No  Verbal Abuse or Emotional Abuse: No  Possible Abuse/Neglect Reported to:: Not Applicable    Psychological Assessment  History of Substance Abuse: None  History of Psychiatric Problems: No         Anticipated Discharge Information  Discharge Disposition: D/T to hospice home (50)

## 2024-05-21 ENCOUNTER — TELEPHONE (OUTPATIENT)
Dept: HEALTH INFORMATION MANAGEMENT | Facility: OTHER | Age: 79
End: 2024-05-21
Payer: MEDICARE

## 2024-05-28 NOTE — DISCHARGE SUMMARY
Discharge Summary    CHIEF COMPLAINT ON ADMISSION  Chief Complaint   Patient presents with    Flank Pain     L flank tenderness x48 hours    Abdominal Pain     X1 day       Reason for Admission  Flank Pain     Admission Date  5/4/2024    CODE STATUS  Prior    HPI & HOSPITAL COURSE  Michelle Moon is a 78 y.o. female who presented 5/4/2024 with falls and abdominal pain.  Patient lives in memory care. Family not at bedside.  Brought in by daughter, reporting multiple falls this week, left flank pain  Agitation.  Patient stated she had left flank pain but felt that this was muscle spasm.     I discussed with EDP, difficult to discern if patient's repeat falls is from symptomatic bradycardia.  Current workup for an infection has been negative.  CT chest, abdomen pelvis did not show any source of infection for patient's abdominal pain complaints.    5/5  Examined in room patient room, afebrile pulse in the 50s to 80s respiratory rate 14-20 systolic blood pressure  pulse ox 9095% on room air.  CBC unremarkable, BMP glucose 108, TTE normal LVEF.  She has no acute complaints, pleasant mood.  Discussed with daughter at bedside, patient lives in a assisted living facility that she feels is unable to adequately care for her, she is actively looking to move her into a place with higher level of care or obtain private bedside sitters and staying in her same location.  Either way daughter expressed desire to pursue hospice, patient has severe dementia, frequent and recurrent falls, hospice referral placed.  Adjust medications as needed to provide relief from any agitation, daughter is completely on board for patient to receive medications to increase comfort. Will address code status with her, I do not believe Full code is currently the status she would want right now, will clarify with her.  Cleared by cardio, no indication for PPM right now. Switched heparin to lovenox for dvt ppx.    5/6 discharged to a group home on  hospice, patient and family very appreciative for her care.    Therefore, she is discharged in fair and stable condition to hospice.    The patient met 2-midnight criteria for an inpatient stay at the time of discharge.    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 *      FOLLOW UP  No future appointments.  Blake Del Angel M.D.  [address] [facility] [id number]  [id number]  [facility] [address]  [phone]            MEDICATIONS ON DISCHARGE     Medication List        CONTINUE taking these medications        Instructions   acetaminophen 325 MG Tabs  Commonly known as: Tylenol   Take 1 Tablet by mouth every four hours as needed for Mild Pain.  Dose: 325 mg     FLUoxetine 20 MG Caps  Commonly known as: PROzac   Take 20 mg by mouth every day.  Dose: 20 mg     Gemtesa 75 MG tablet  Generic drug: vibegron   Take 75 mg by mouth every evening.  Dose: 75 mg     hydrOXYzine HCl 25 MG Tabs  Commonly known as: Atarax   Take 25 mg by mouth every 6 hours as needed for Anxiety.  Dose: 25 mg     lisinopril 20 MG Tabs  Commonly known as: Prinivil   Take 20 mg by mouth every day.  Dose: 20 mg     multivitamin Tabs   Take 1 Tablet by mouth every day.  Dose: 1 Tablet     omeprazole 20 MG delayed-release capsule  Commonly known as: PriLOSEC   Take 20 mg by mouth every day.  Dose: 20 mg     Prolia 60 MG/ML Soln  Generic drug: denosumab   Inject 60 mg as instructed Once. Every 6 months  Dose: 60 mg     QUEtiapine 25 MG Tabs  Commonly known as: SEROquel   Take 2 Tablets by mouth at bedtime.  Dose: 50 mg     rosuvastatin 20 MG Tabs  Commonly known as:  Crestor   Take 20 mg by mouth at bedtime.  Dose: 20 mg              Allergies  Allergies   Allergen Reactions    Nkda [No Known Drug Allergy]        DIET  No orders of the defined types were placed in this encounter.      ACTIVITY  As tolerated.  Weight bearing as tolerated    CONSULTATIONS  Hospice    PROCEDURES  NA    LABORATORY  Lab Results   Component Value Date    SODIUM 139 05/05/2024    POTASSIUM 4.3 05/05/2024    CHLORIDE 107 05/05/2024    CO2 22 05/05/2024    GLUCOSE 108 (H) 05/05/2024    BUN 15 05/05/2024    CREATININE 0.50 05/05/2024        Lab Results   Component Value Date    WBC 6.2 05/05/2024    HEMOGLOBIN 12.7 05/05/2024    HEMATOCRIT 38.2 05/05/2024    PLATELETCT 225 05/05/2024        Total time of the discharge process exceeds 35 minutes.

## 2025-01-28 NOTE — ASSESSMENT & PLAN NOTE
ED Fall down steps.  Trauma Green Activation.  Kamran Muñoz MD. Trauma Surgery.   Dr. Alexander MAR Dr. Alexander Dr. Alexander Dr Alexander Dr. Vijay Alexander

## 2025-03-12 ENCOUNTER — HOSPITAL ENCOUNTER (OUTPATIENT)
Facility: MEDICAL CENTER | Age: 80
End: 2025-03-12
Attending: NURSE PRACTITIONER
Payer: MEDICARE

## 2025-03-12 LAB
GRAM STN SPEC: NORMAL
SIGNIFICANT IND 70042: NORMAL
SITE SITE: NORMAL
SOURCE SOURCE: NORMAL

## 2025-03-12 PROCEDURE — 87070 CULTURE OTHR SPECIMN AEROBIC: CPT

## 2025-03-12 PROCEDURE — 87205 SMEAR GRAM STAIN: CPT

## 2025-03-12 PROCEDURE — 87186 SC STD MICRODIL/AGAR DIL: CPT

## 2025-03-12 PROCEDURE — 87077 CULTURE AEROBIC IDENTIFY: CPT | Mod: 91

## 2025-03-14 LAB
BACTERIA WND AEROBE CULT: ABNORMAL
BACTERIA WND AEROBE CULT: ABNORMAL
GRAM STN SPEC: ABNORMAL
SIGNIFICANT IND 70042: ABNORMAL
SITE SITE: ABNORMAL
SOURCE SOURCE: ABNORMAL

## (undated) DEVICE — DRAPE 36X28IN RAD CARM BND BG - (25/CA) O

## (undated) DEVICE — HEAD HOLDER JUNIOR/ADULT

## (undated) DEVICE — LACTATED RINGERS INJ 1000 ML - (14EA/CA 60CA/PF)

## (undated) DEVICE — SUTURE 3-0 ETHILON FSLX 30 (36PK/BX)"

## (undated) DEVICE — KIT ANESTHESIA W/CIRCUIT & 3/LT BAG W/FILTER (20EA/CA)

## (undated) DEVICE — CANNULA W/ SUPPLY TUBING O2 - (50/CA)

## (undated) DEVICE — ELECTRODE DUAL RETURN W/ CORD - (50/PK)

## (undated) DEVICE — BITE BLOCK ADULT 60FR (100EA/CA)

## (undated) DEVICE — SENSOR SPO2 NEO LNCS ADHESIVE (20/BX) SEE USER NOTES

## (undated) DEVICE — CONTAINER, SPECIMEN, STERILE

## (undated) DEVICE — SLEEVE, VASO, THIGH, MED

## (undated) DEVICE — KIT CUSTOM PROCEDURE SINGLE FOR ENDO  (15/CA)

## (undated) DEVICE — DRAPE C ARMOR (12EA/CA)

## (undated) DEVICE — BASIN EMESIS DISP. - (250/CA)

## (undated) DEVICE — SYRINGE 6 CC 20 GA X 1 1/2 - NDL SAFETY  (50/BX)

## (undated) DEVICE — DRAPE LARGE 3 QUARTER - (20/CA)

## (undated) DEVICE — SET LEADWIRE 5 LEAD BEDSIDE DISPOSABLE ECG (1SET OF 5/EA)

## (undated) DEVICE — PADDING CAST 6 IN STERILE - 6 X 4 YDS (24/CA)

## (undated) DEVICE — FILM CASSETTE ENDO

## (undated) DEVICE — TOURNIQUET CUFF 24 X 4 ONE PORT - STERILE (10/BX)

## (undated) DEVICE — GLOVE BIOGEL INDICATOR SZ 7.5 SURGICAL PF LTX - (50PR/BX 4BX/CA)

## (undated) DEVICE — GOWN WARMING STANDARD FLEX - (30/CA)

## (undated) DEVICE — DRAPE C-ARM LARGE 41IN X 74 IN - (10/BX 2BX/CA)

## (undated) DEVICE — ELECTRODE 850 FOAM ADHESIVE - HYDROGEL RADIOTRNSPRNT (50/PK)

## (undated) DEVICE — SUCTION INSTRUMENT YANKAUER BULBOUS TIP W/O VENT (50EA/CA)

## (undated) DEVICE — CON SEDATION EA ADDL 15 MIN

## (undated) DEVICE — GUIDE PIN 1.25X150 THRD OIC - (6EA/BX) (5TX6=30)

## (undated) DEVICE — SUTURE 2-0 VICRYL PLUS CT-1 - 8 X 18 INCH(12/BX)

## (undated) DEVICE — GLOVE BIOGEL SZ 7.5 SURGICAL PF LTX - (50PR/BX 4BX/CA)

## (undated) DEVICE — TUBING CLEARLINK DUO-VENT - C-FLO (48EA/CA)

## (undated) DEVICE — CANISTER SUCTION 3000ML MECHANICAL FILTER AUTO SHUTOFF MEDI-VAC NONSTERILE LF DISP  (40EA/CA)

## (undated) DEVICE — CON SEDATION/>5 YR 1ST 15 MIN

## (undated) DEVICE — SODIUM CHL IRRIGATION 0.9% 1000ML (12EA/CA)

## (undated) DEVICE — SUTURE GENERAL

## (undated) DEVICE — SYRINGE 30 ML LL (56/BX)

## (undated) DEVICE — NEPTUNE 4 PORT MANIFOLD - (20/PK)

## (undated) DEVICE — BANDAGE ELASTIC 6 HONEYCOMB - 6X5YD LF (20/CA)"

## (undated) DEVICE — SET EXTENSION WITH 2 PORTS (48EA/CA) ***PART #2C8610 IS A SUBSTITUTE*****

## (undated) DEVICE — CANISTER SUCTION RIGID RED 1500CC (40EA/CA)

## (undated) DEVICE — MASK ANESTHESIA ADULT  - (100/CA)

## (undated) DEVICE — GOWN SURGEONS X-LARGE - DISP. (30/CA)

## (undated) DEVICE — CHLORAPREP 26 ML APPLICATOR - ORANGE TINT(25/CA)

## (undated) DEVICE — SYRINGE 3 CC 22 GA X 1-1/2 - NDL SAFETY (50/BX 8BX/CA)

## (undated) DEVICE — DRILL BIT 1.8MMX80MM CALIBRATED (4TX2=8)

## (undated) DEVICE — PROTECTOR ULNA NERVE - (36PR/CA)

## (undated) DEVICE — MASK AIRWAY SIZE 3 UNIQUE SILICON (10/BX)

## (undated) DEVICE — DRILL BIT 2.8MM X 155MM CALIBRATED (8TX2=16)

## (undated) DEVICE — KIT ROOM DECONTAMINATION

## (undated) DEVICE — PACK MAJOR ORTHO - (2EA/CA)

## (undated) DEVICE — BLOCK

## (undated) DEVICE — MASK WITH FACE SHIELD (25/BX 4BX/CA)

## (undated) DEVICE — SYRINGE DISP. 50CC LS - (40/BX)

## (undated) DEVICE — SOD. CHL 10CC SYRINGE PREFILL - W/10 CC (30/BX)

## (undated) DEVICE — NEEDLE NON SAFETY HYPO 22 GA X 1 1/2 IN (100/BX)

## (undated) DEVICE — DRAPE LOWER EXTREMETY - (6/CA)